# Patient Record
Sex: MALE | Race: WHITE | Employment: OTHER | URBAN - METROPOLITAN AREA
[De-identification: names, ages, dates, MRNs, and addresses within clinical notes are randomized per-mention and may not be internally consistent; named-entity substitution may affect disease eponyms.]

---

## 2020-09-14 ENCOUNTER — HOSPITAL ENCOUNTER (OUTPATIENT)
Age: 70
Discharge: HOME | End: 2020-09-16
Payer: MEDICARE

## 2020-09-14 ENCOUNTER — OFFICE VISIT (OUTPATIENT)
Dept: PRIMARY CARE CLINIC | Age: 70
End: 2020-09-14
Payer: MEDICARE

## 2020-09-14 VITALS
SYSTOLIC BLOOD PRESSURE: 122 MMHG | DIASTOLIC BLOOD PRESSURE: 70 MMHG | HEART RATE: 98 BPM | WEIGHT: 122 LBS | OXYGEN SATURATION: 99 % | HEIGHT: 69 IN | TEMPERATURE: 97.9 F | BODY MASS INDEX: 18.07 KG/M2

## 2020-09-14 PROBLEM — R07.89 LEFT-SIDED CHEST WALL PAIN: Status: ACTIVE | Noted: 2020-09-14

## 2020-09-14 PROBLEM — K21.9 GASTROESOPHAGEAL REFLUX DISEASE WITHOUT ESOPHAGITIS: Status: ACTIVE | Noted: 2020-09-14

## 2020-09-14 PROBLEM — N40.0 BENIGN PROSTATIC HYPERPLASIA: Status: ACTIVE | Noted: 2020-09-14

## 2020-09-14 PROBLEM — C34.90 PRIMARY ADENOCARCINOMA OF LUNG (HCC): Status: ACTIVE | Noted: 2020-09-14

## 2020-09-14 PROBLEM — Z90.2 S/P LOBECTOMY OF LUNG: Status: ACTIVE | Noted: 2020-09-14

## 2020-09-14 PROBLEM — I48.91 ATRIAL FIBRILLATION (HCC): Status: ACTIVE | Noted: 2020-09-14

## 2020-09-14 PROBLEM — I10 ESSENTIAL HYPERTENSION: Status: ACTIVE | Noted: 2020-09-14

## 2020-09-14 PROBLEM — E78.00 HYPERCHOLESTEROLEMIA: Status: ACTIVE | Noted: 2020-09-14

## 2020-09-14 LAB
ALBUMIN SERPL-MCNC: 4.1 G/DL (ref 3.5–5.2)
ALP BLD-CCNC: 77 U/L (ref 40–129)
ALT SERPL-CCNC: 11 U/L (ref 0–40)
ANION GAP SERPL CALCULATED.3IONS-SCNC: 16 MMOL/L (ref 7–16)
AST SERPL-CCNC: 26 U/L (ref 0–39)
BILIRUB SERPL-MCNC: 0.4 MG/DL (ref 0–1.2)
BUN BLDV-MCNC: 8 MG/DL (ref 8–23)
CALCIUM SERPL-MCNC: 9.9 MG/DL (ref 8.6–10.2)
CHLORIDE BLD-SCNC: 102 MMOL/L (ref 98–107)
CHOLESTEROL, TOTAL: 173 MG/DL (ref 0–199)
CO2: 23 MMOL/L (ref 22–29)
CREAT SERPL-MCNC: 0.9 MG/DL (ref 0.7–1.2)
GFR AFRICAN AMERICAN: >60
GFR NON-AFRICAN AMERICAN: >60 ML/MIN/1.73
GLUCOSE BLD-MCNC: 96 MG/DL (ref 74–99)
HBA1C MFR BLD: 4.8 % (ref 4–5.6)
HCT VFR BLD CALC: 41 % (ref 37–54)
HDLC SERPL-MCNC: 80 MG/DL
HEMOGLOBIN: 12.4 G/DL (ref 12.5–16.5)
LDL CHOLESTEROL CALCULATED: 74 MG/DL (ref 0–99)
MCH RBC QN AUTO: 28.3 PG (ref 26–35)
MCHC RBC AUTO-ENTMCNC: 30.2 % (ref 32–34.5)
MCV RBC AUTO: 93.6 FL (ref 80–99.9)
PDW BLD-RTO: 14 FL (ref 11.5–15)
PLATELET # BLD: 266 E9/L (ref 130–450)
PMV BLD AUTO: 10.4 FL (ref 7–12)
POTASSIUM SERPL-SCNC: 4.9 MMOL/L (ref 3.5–5)
RBC # BLD: 4.38 E12/L (ref 3.8–5.8)
SODIUM BLD-SCNC: 141 MMOL/L (ref 132–146)
TOTAL PROTEIN: 7.5 G/DL (ref 6.4–8.3)
TRIGL SERPL-MCNC: 93 MG/DL (ref 0–149)
VLDLC SERPL CALC-MCNC: 19 MG/DL
WBC # BLD: 5.9 E9/L (ref 4.5–11.5)

## 2020-09-14 PROCEDURE — 80053 COMPREHEN METABOLIC PANEL: CPT

## 2020-09-14 PROCEDURE — 99204 OFFICE O/P NEW MOD 45 MIN: CPT | Performed by: INTERNAL MEDICINE

## 2020-09-14 PROCEDURE — 3017F COLORECTAL CA SCREEN DOC REV: CPT | Performed by: INTERNAL MEDICINE

## 2020-09-14 PROCEDURE — 85027 COMPLETE CBC AUTOMATED: CPT

## 2020-09-14 PROCEDURE — 1036F TOBACCO NON-USER: CPT | Performed by: INTERNAL MEDICINE

## 2020-09-14 PROCEDURE — 83036 HEMOGLOBIN GLYCOSYLATED A1C: CPT

## 2020-09-14 PROCEDURE — 4040F PNEUMOC VAC/ADMIN/RCVD: CPT | Performed by: INTERNAL MEDICINE

## 2020-09-14 PROCEDURE — 80061 LIPID PANEL: CPT

## 2020-09-14 PROCEDURE — G8419 CALC BMI OUT NRM PARAM NOF/U: HCPCS | Performed by: INTERNAL MEDICINE

## 2020-09-14 PROCEDURE — 1123F ACP DISCUSS/DSCN MKR DOCD: CPT | Performed by: INTERNAL MEDICINE

## 2020-09-14 PROCEDURE — G8427 DOCREV CUR MEDS BY ELIG CLIN: HCPCS | Performed by: INTERNAL MEDICINE

## 2020-09-14 RX ORDER — LOSARTAN POTASSIUM 100 MG/1
TABLET ORAL DAILY
COMMUNITY
Start: 2020-04-02 | End: 2020-10-02 | Stop reason: SDUPTHER

## 2020-09-14 RX ORDER — ALFUZOSIN HYDROCHLORIDE 10 MG/1
10 TABLET, EXTENDED RELEASE ORAL DAILY
COMMUNITY
Start: 2020-07-28 | End: 2020-12-15 | Stop reason: SDUPTHER

## 2020-09-14 RX ORDER — UMECLIDINIUM 62.5 UG/1
AEROSOL, POWDER ORAL DAILY
COMMUNITY
Start: 2020-08-12 | End: 2020-10-16 | Stop reason: ALTCHOICE

## 2020-09-14 RX ORDER — BUDESONIDE AND FORMOTEROL FUMARATE DIHYDRATE 160; 4.5 UG/1; UG/1
2 AEROSOL RESPIRATORY (INHALATION) 2 TIMES DAILY
COMMUNITY
Start: 2020-08-11 | End: 2020-10-16 | Stop reason: ALTCHOICE

## 2020-09-14 RX ORDER — AMITRIPTYLINE HYDROCHLORIDE 25 MG/1
25 TABLET, FILM COATED ORAL NIGHTLY
COMMUNITY
End: 2021-08-06

## 2020-09-14 RX ORDER — BUSPIRONE HYDROCHLORIDE 5 MG/1
TABLET ORAL DAILY PRN
COMMUNITY
Start: 2020-04-16 | End: 2020-09-14 | Stop reason: ALTCHOICE

## 2020-09-14 RX ORDER — ATORVASTATIN CALCIUM 10 MG/1
TABLET, FILM COATED ORAL DAILY
COMMUNITY
Start: 2020-04-01 | End: 2020-10-02 | Stop reason: SDUPTHER

## 2020-09-14 RX ORDER — LIDOCAINE 4 G/G
1 PATCH TOPICAL DAILY
Qty: 30 PATCH | Refills: 0 | Status: SHIPPED | OUTPATIENT
Start: 2020-09-14 | End: 2020-10-14

## 2020-09-14 RX ORDER — AMIODARONE HYDROCHLORIDE 100 MG/1
100 TABLET ORAL DAILY
COMMUNITY
Start: 2020-08-31 | End: 2021-03-16 | Stop reason: SDUPTHER

## 2020-09-14 RX ORDER — PANTOPRAZOLE SODIUM 40 MG/1
40 TABLET, DELAYED RELEASE ORAL DAILY
COMMUNITY
End: 2021-03-16 | Stop reason: SDUPTHER

## 2020-09-14 ASSESSMENT — PATIENT HEALTH QUESTIONNAIRE - PHQ9
SUM OF ALL RESPONSES TO PHQ QUESTIONS 1-9: 0
SUM OF ALL RESPONSES TO PHQ9 QUESTIONS 1 & 2: 0
SUM OF ALL RESPONSES TO PHQ QUESTIONS 1-9: 0
1. LITTLE INTEREST OR PLEASURE IN DOING THINGS: 0
2. FEELING DOWN, DEPRESSED OR HOPELESS: 0

## 2020-09-14 NOTE — PROGRESS NOTES
20    Leyla Raymond, a male of 72 y.o. came to the office    HPI     Leyla Raymond presents today as a new patient. He takes alfuzosin, amiodarone, Eliquis, Lipitor, Symbicort, Advair, Cozaar, Spiriva, Incruse, Protonix and Elavil. He has a history of BPH, lung cancer, atrial fibrillation, hyper cholesterolemia, COPD hypertension GERD. He follows up with cardiology at the [facility]. He last saw them through virtual means at the end of August.  They recommended continuation of amiodarone and Eliquis. He has a history of lung cancer, adenocarcinoma. He is status post lobectomy. This was followed at [facility] as well. He also follows up with urology for BPH at [facility]. He takes alfuzosin. He is having some chest soreness and left side pain. This is over the site of the lobectomy. This pain has been better. He had it over 2 months. He does have some shortness of breath that has been stable. He was treated for pneumonia in the hospital and he had a thoracentesis. He was discharged home on an antibiotic.      Surgical History  Lobectomy in     Family History  Dad -  CAD -  at [de-identified]  Mom - DM, stroke -  in her [de-identified]  Siblings - sister - DM  at [de-identified]  Children - daughter with thyroid cancer, son - crohn's disease    Social History  Occupation- retired  Tobacco - 54 pack years, quit 60 days ago  Alcohol - None  Drug -  None    Current Outpatient Medications on File Prior to Visit   Medication Sig Dispense Refill    alfuzosin (UROXATRAL) 10 MG extended release tablet Take 10 mg by mouth daily      amiodarone (PACERONE) 100 MG tablet Take 100 mg by mouth daily      apixaban (ELIQUIS) 2.5 MG TABS tablet Take 2.5 mg by mouth 2 times daily      atorvastatin (LIPITOR) 10 MG tablet daily      budesonide-formoterol (SYMBICORT) 160-4.5 MCG/ACT AERO Inhale 2 puffs into the lungs 2 times daily      fluticasone-salmeterol (ADVAIR) 250-50 MCG/DOSE AEPB pulses. Exam reveals no gallop and no friction rub. No murmur heard. Pulmonary/Chest: Effort normal and breath sounds normal. No stridor. No respiratory distress. No wheezes or rales. Abdominal: Soft. Bowel sounds are normal. There is no distension nor mass. There is no tenderness. There is no guarding. Musculoskeletal: No edema tenderness or deformity  Neurological: Alert and oriented to person, place, and time. No cranial nerve deficit. Normal muscle tone. Coordination normal.   Skin: Skin is warm and dry. No diaphoresis. No erythema. Psychiatric: Normal mood and affect. Behavior is normal.         ASSESSMENT AND PLAN:    Thi Odom was seen today for established new doctor. Diagnoses and all orders for this visit:    Primary adenocarcinoma of lung, unspecified laterality ([facility]    Atrial fibrillation, unspecified type [facility])  -     Toyin Oneill MD, Cardiology, [place]  -     Hemoglobin A1C; Future    Gastroesophageal reflux disease without esophagitis  -     Mary Griggs DO [place] ([facility])    S/P lobectomy of lung  -     Gibran Rendon MD, Hematology and Oncology, [place] ([facility])    Essential hypertension  -     CBC; Future  -     Comprehensive Metabolic Panel; Future    Hypercholesterolemia  -     Lipid Panel; Future    Benign prostatic hyperplasia, unspecified whether lower urinary tract symptoms present  -     External Referral To Urology        Guanaco Brooks presents today as a new patient. His multiple issues today. The first of which is left-sided chest wall pain. He is status post lobectomy. Today I will give him lidocaine patch. He states that his hospitalization was complicated by pneumonia. He still does have shortness of breath. I will perform chest radiography to rule out any pneumonia versus pleural effusion. I will refer him to gastroenterology for uncontrolled GERD. I will refer him to a cardiologist at his request for atrial fibrillation.   He states that his symptoms are well controlled on Eliquis and amiodarone. I will also refer him to urology for management of his BPH. I will perform routine blood work. See him back in 4 to 6 weeks to assess the multiple follow-ups. Please note that the above documentation was prepared using voice recognition software. Every attempt was made to ensure accuracy but there may be spelling, grammatical, and contextual errors. No follow-ups on file.     Kori Osorio, DO

## 2020-09-16 ENCOUNTER — HOSPITAL ENCOUNTER (OUTPATIENT)
Dept: GENERAL RADIOLOGY | Age: 70
Discharge: HOME | End: 2020-09-18
Payer: MEDICARE

## 2020-09-16 ENCOUNTER — HOSPITAL ENCOUNTER (OUTPATIENT)
Age: 70
Discharge: HOME | End: 2020-09-18
Payer: MEDICARE

## 2020-09-16 PROCEDURE — 71046 X-RAY EXAM CHEST 2 VIEWS: CPT

## 2020-10-01 ENCOUNTER — HOSPITAL ENCOUNTER (OUTPATIENT)
Dept: CT IMAGING | Age: 70
Discharge: HOME | End: 2020-10-03
Payer: MEDICARE

## 2020-10-01 PROCEDURE — 71260 CT THORAX DX C+: CPT

## 2020-10-01 PROCEDURE — 6360000004 HC RX CONTRAST MEDICATION: Performed by: STUDENT IN AN ORGANIZED HEALTH CARE EDUCATION/TRAINING PROGRAM

## 2020-10-01 RX ADMIN — IOPAMIDOL 90 ML: 755 INJECTION, SOLUTION INTRAVENOUS at 19:10

## 2020-10-02 RX ORDER — ATORVASTATIN CALCIUM 10 MG/1
10 TABLET, FILM COATED ORAL DAILY
Qty: 30 TABLET | Refills: 5 | Status: SHIPPED
Start: 2020-10-02 | End: 2021-03-16 | Stop reason: SDUPTHER

## 2020-10-02 RX ORDER — LOSARTAN POTASSIUM 100 MG/1
100 TABLET ORAL DAILY
Qty: 30 TABLET | Refills: 5 | Status: SHIPPED
Start: 2020-10-02 | End: 2021-03-16 | Stop reason: SDUPTHER

## 2020-12-15 RX ORDER — ALFUZOSIN HYDROCHLORIDE 10 MG/1
10 TABLET, EXTENDED RELEASE ORAL DAILY
Qty: 30 TABLET | Refills: 2 | Status: SHIPPED
Start: 2020-12-15 | End: 2021-03-16 | Stop reason: SDUPTHER

## 2021-03-16 ENCOUNTER — OFFICE VISIT (OUTPATIENT)
Dept: PRIMARY CARE CLINIC | Age: 71
End: 2021-03-16
Payer: MEDICARE

## 2021-03-16 VITALS
HEART RATE: 92 BPM | WEIGHT: 147 LBS | DIASTOLIC BLOOD PRESSURE: 84 MMHG | BODY MASS INDEX: 21.71 KG/M2 | OXYGEN SATURATION: 99 % | TEMPERATURE: 97 F | SYSTOLIC BLOOD PRESSURE: 136 MMHG

## 2021-03-16 DIAGNOSIS — K21.9 GASTROESOPHAGEAL REFLUX DISEASE WITHOUT ESOPHAGITIS: ICD-10-CM

## 2021-03-16 DIAGNOSIS — I48.91 ATRIAL FIBRILLATION, UNSPECIFIED TYPE (HCC): ICD-10-CM

## 2021-03-16 DIAGNOSIS — C34.90 PRIMARY ADENOCARCINOMA OF LUNG, UNSPECIFIED LATERALITY (HCC): Primary | ICD-10-CM

## 2021-03-16 DIAGNOSIS — E78.00 HYPERCHOLESTEROLEMIA: ICD-10-CM

## 2021-03-16 DIAGNOSIS — I10 ESSENTIAL HYPERTENSION: ICD-10-CM

## 2021-03-16 DIAGNOSIS — N40.0 BENIGN PROSTATIC HYPERPLASIA, UNSPECIFIED WHETHER LOWER URINARY TRACT SYMPTOMS PRESENT: ICD-10-CM

## 2021-03-16 LAB
ALBUMIN SERPL-MCNC: 4.7 G/DL (ref 3.5–5.2)
ALP BLD-CCNC: 71 U/L (ref 40–129)
ALT SERPL-CCNC: 10 U/L (ref 0–40)
ANION GAP SERPL CALCULATED.3IONS-SCNC: 10 MMOL/L (ref 7–16)
AST SERPL-CCNC: 16 U/L (ref 0–39)
BILIRUB SERPL-MCNC: 0.5 MG/DL (ref 0–1.2)
BUN BLDV-MCNC: 11 MG/DL (ref 8–23)
CALCIUM SERPL-MCNC: 9.5 MG/DL (ref 8.6–10.2)
CHLORIDE BLD-SCNC: 102 MMOL/L (ref 98–107)
CHOLESTEROL, TOTAL: 160 MG/DL (ref 0–199)
CO2: 29 MMOL/L (ref 22–29)
CREAT SERPL-MCNC: 1.1 MG/DL (ref 0.7–1.2)
GFR AFRICAN AMERICAN: >60
GFR NON-AFRICAN AMERICAN: >60 ML/MIN/1.73
GLUCOSE BLD-MCNC: 72 MG/DL (ref 74–99)
HCT VFR BLD CALC: 32.8 % (ref 37–54)
HDLC SERPL-MCNC: 64 MG/DL
HEMOGLOBIN: 9.7 G/DL (ref 12.5–16.5)
LDL CHOLESTEROL CALCULATED: 82 MG/DL (ref 0–99)
MCH RBC QN AUTO: 24.4 PG (ref 26–35)
MCHC RBC AUTO-ENTMCNC: 29.6 % (ref 32–34.5)
MCV RBC AUTO: 82.4 FL (ref 80–99.9)
PDW BLD-RTO: 15.6 FL (ref 11.5–15)
PLATELET # BLD: 314 E9/L (ref 130–450)
PMV BLD AUTO: 10.7 FL (ref 7–12)
POTASSIUM SERPL-SCNC: 4.9 MMOL/L (ref 3.5–5)
RBC # BLD: 3.98 E12/L (ref 3.8–5.8)
SODIUM BLD-SCNC: 141 MMOL/L (ref 132–146)
TOTAL PROTEIN: 7.6 G/DL (ref 6.4–8.3)
TRIGL SERPL-MCNC: 69 MG/DL (ref 0–149)
VLDLC SERPL CALC-MCNC: 14 MG/DL
WBC # BLD: 9 E9/L (ref 4.5–11.5)

## 2021-03-16 PROCEDURE — 1123F ACP DISCUSS/DSCN MKR DOCD: CPT | Performed by: INTERNAL MEDICINE

## 2021-03-16 PROCEDURE — 1036F TOBACCO NON-USER: CPT | Performed by: INTERNAL MEDICINE

## 2021-03-16 PROCEDURE — G8427 DOCREV CUR MEDS BY ELIG CLIN: HCPCS | Performed by: INTERNAL MEDICINE

## 2021-03-16 PROCEDURE — 99214 OFFICE O/P EST MOD 30 MIN: CPT | Performed by: INTERNAL MEDICINE

## 2021-03-16 PROCEDURE — 4040F PNEUMOC VAC/ADMIN/RCVD: CPT | Performed by: INTERNAL MEDICINE

## 2021-03-16 PROCEDURE — G8420 CALC BMI NORM PARAMETERS: HCPCS | Performed by: INTERNAL MEDICINE

## 2021-03-16 PROCEDURE — G8482 FLU IMMUNIZE ORDER/ADMIN: HCPCS | Performed by: INTERNAL MEDICINE

## 2021-03-16 PROCEDURE — 3017F COLORECTAL CA SCREEN DOC REV: CPT | Performed by: INTERNAL MEDICINE

## 2021-03-16 RX ORDER — LOSARTAN POTASSIUM 100 MG/1
100 TABLET ORAL DAILY
Qty: 30 TABLET | Refills: 5 | Status: SHIPPED
Start: 2021-03-16 | End: 2021-10-12 | Stop reason: SDUPTHER

## 2021-03-16 RX ORDER — AMIODARONE HYDROCHLORIDE 100 MG/1
100 TABLET ORAL DAILY
Qty: 30 TABLET | Refills: 5 | Status: CANCELLED | OUTPATIENT
Start: 2021-03-16

## 2021-03-16 RX ORDER — PANTOPRAZOLE SODIUM 40 MG/1
40 TABLET, DELAYED RELEASE ORAL DAILY
Qty: 30 TABLET | Refills: 3 | Status: SHIPPED
Start: 2021-03-16 | End: 2021-07-09

## 2021-03-16 RX ORDER — ATORVASTATIN CALCIUM 10 MG/1
10 TABLET, FILM COATED ORAL DAILY
Qty: 30 TABLET | Refills: 5 | Status: SHIPPED
Start: 2021-03-16 | End: 2021-10-12 | Stop reason: SDUPTHER

## 2021-03-16 RX ORDER — AMIODARONE HYDROCHLORIDE 100 MG/1
100 TABLET ORAL DAILY
Qty: 30 TABLET | Refills: 3 | Status: SHIPPED | OUTPATIENT
Start: 2021-03-16

## 2021-03-16 RX ORDER — ALFUZOSIN HYDROCHLORIDE 10 MG/1
10 TABLET, EXTENDED RELEASE ORAL DAILY
Qty: 30 TABLET | Refills: 2 | Status: SHIPPED
Start: 2021-03-16 | End: 2021-08-06

## 2021-03-16 RX ORDER — TRAMADOL HYDROCHLORIDE 50 MG/1
50 TABLET ORAL EVERY 4 HOURS PRN
Qty: 30 TABLET | Refills: 0 | Status: SHIPPED | OUTPATIENT
Start: 2021-03-16 | End: 2021-03-21

## 2021-03-16 SDOH — ECONOMIC STABILITY: TRANSPORTATION INSECURITY
IN THE PAST 12 MONTHS, HAS THE LACK OF TRANSPORTATION KEPT YOU FROM MEDICAL APPOINTMENTS OR FROM GETTING MEDICATIONS?: NO

## 2021-03-16 SDOH — ECONOMIC STABILITY: TRANSPORTATION INSECURITY
IN THE PAST 12 MONTHS, HAS LACK OF TRANSPORTATION KEPT YOU FROM MEETINGS, WORK, OR FROM GETTING THINGS NEEDED FOR DAILY LIVING?: NO

## 2021-03-16 SDOH — ECONOMIC STABILITY: FOOD INSECURITY: WITHIN THE PAST 12 MONTHS, YOU WORRIED THAT YOUR FOOD WOULD RUN OUT BEFORE YOU GOT MONEY TO BUY MORE.: NEVER TRUE

## 2021-03-16 SDOH — ECONOMIC STABILITY: INCOME INSECURITY: HOW HARD IS IT FOR YOU TO PAY FOR THE VERY BASICS LIKE FOOD, HOUSING, MEDICAL CARE, AND HEATING?: NOT ASKED

## 2021-03-16 ASSESSMENT — PATIENT HEALTH QUESTIONNAIRE - PHQ9
1. LITTLE INTEREST OR PLEASURE IN DOING THINGS: 0
SUM OF ALL RESPONSES TO PHQ QUESTIONS 1-9: 0
SUM OF ALL RESPONSES TO PHQ9 QUESTIONS 1 & 2: 0

## 2021-03-16 NOTE — PROGRESS NOTES
3/16/21    Alden France, a male of 80 y.o. came to the office     Alden France presents today his last appointment he has met with a local pulmonologist.  He was started on Trelegy. He is going to have an upper endoscopy Friday. He has been having some right chest wall soreness. He used to take tramadol. Patient Active Problem List   Diagnosis    [facility] adenocarcinoma [facility] lung ([facility]    Atrial fibrillation ([facility]    Gastroesophageal reflux disease without esophagitis    S/P lobectomy of lung    Essential hypertension    Hypercholesterolemia    Benign prostatic hyperplasia    Left-sided chest wall pain      No Known Allergies  Current Outpatient Medications on File Prior to Visit   Medication Sig Dispense Refill    fluticasone-umeclidin-vilant (TRELEGY ELLIPTA) 100-62.5-25 MCG/INH AEPB Inhale 1 puff into the lungs daily 60 each 6    apixaban (ELIQUIS) 2.5 MG TABS tablet Take 2.5 mg by mouth 2 times daily      amitriptyline (ELAVIL) 25 MG tablet Take 25 mg by mouth nightly       No current facility-administered medications on file prior to visit. Review of Systems  Constitutional:Negative for activity change, appetite change, chills, fatigue and fever. Respiratory: Negative for choking, chest tightness, shortness of breath and wheezing. Cardiovascular: Negative for chest pain, palpitations and leg swelling. Gastrointestinal: Negative for abdominal distention, constipation, diarrhea, nausea and vomiting. Musculoskeletal: Negative for arthralgias, back pain, gait problem and joint swelling. Neurological: Negative for dizziness, weakness,numbness and headaches. /84   Pulse 92   Temp 97 °F (36.1 °C)   Wt 147 lb (66.7 kg)   SpO2 99%   BMI 21.71 kg/m²      Physical Exam   Constitutional:  Oriented to person, place, and time. Appears well-developed and well-nourished. No acute distress. HENT: No sinus tenderness or lymphadenopathy  Head: Normocephalic and atraumatic. Eyes: Eyes exhibits no discharge. No scleral icterus present. Neck: No tracheal deviation present. No thyromegaly present. Cardiovascular: Normal rate, regular rhythm, normal heart sounds and intact distal pulses. Exam reveals no gallop nor friction rub. No murmur heard. Pulmonary: Effort normal and breath sounds normal. No respiratory distress. No wheezes or rales. Abdomen: No signs of rigidity rebound or organomegaly  Musculoskeletal:  No tenderness to palpation  Neurological:Alert and oriented to person, place, and time. Skin: No diaphoresis. Psychiatric: Normal mood and affect. Behavior is Normal.     ASSESSMENT AND PLAN:    Carol Rowland was seen today for 6 month follow-up. Diagnoses and all orders for this visit:    Primary adenocarcinoma of lung, unspecified laterality (HCC)  -     traMADol (ULTRAM) 50 MG tablet; Take 1 tablet by mouth every 4 hours as needed for Pain for up to 5 days. Intended supply: 5 days. Take lowest dose possible to manage pain    Atrial fibrillation, unspecified type (HCC)    Gastroesophageal reflux disease without esophagitis  -     pantoprazole (PROTONIX) 40 MG tablet; Take 1 tablet by mouth daily    Essential hypertension  -     losartan (COZAAR) 100 MG tablet; Take 1 tablet by mouth daily  -     CBC; Future  -     Comprehensive Metabolic Panel; Future    Hypercholesterolemia  -     atorvastatin (LIPITOR) 10 MG tablet; Take 1 tablet by mouth daily  -     Lipid Panel; Future    Benign prostatic hyperplasia, unspecified whether lower urinary tract symptoms present  -     alfuzosin (UROXATRAL) 10 MG extended release tablet; Take 1 tablet by mouth daily    Other orders  -     amiodarone (PACERONE) 100 MG tablet;  Take 1 tablet by mouth daily      Today I will give him a short supply of tramadol given his pain status post lobectomy    I will give him the information for his pulmonologist and instructed him to follow back for CAT scan and pulmonary function testing      I will give him a short supply of amiodarone but informed her that I do not chronically prescribe this medication    He was advised to follow-up with a cardiologist as well      He does have an upcoming endoscopy for his stomach issues      I will resume his routine medications for now and obtain routine blood work. Electronically signed by Michelle Pinon DO on 3/16/2021 at 4:02 PM      Please note that the above documentation was prepared using voice recognition software. Every attempt was made to ensure accuracy but there may be spelling, grammatical, and contextual errors. Electronically signed by Michelle Pinon DO on 3/16/2021 at 4:02 PM          No follow-ups on file.     Michelle Pinon DO

## 2021-03-17 DIAGNOSIS — D64.9 ANEMIA, UNSPECIFIED TYPE: Primary | ICD-10-CM

## 2021-03-17 RX ORDER — LANOLIN ALCOHOL/MO/W.PET/CERES
325 CREAM (GRAM) TOPICAL 2 TIMES DAILY
Qty: 90 TABLET | Refills: 3 | Status: SHIPPED | OUTPATIENT
Start: 2021-03-17 | End: 2021-10-12 | Stop reason: SDUPTHER

## 2021-04-27 ENCOUNTER — HOSPITAL ENCOUNTER (OUTPATIENT)
Dept: CT IMAGING | Age: 71
Discharge: HOME | End: 2021-04-29
Payer: MEDICARE

## 2021-04-27 DIAGNOSIS — C34.32 SQUAMOUS CELL CARCINOMA OF BRONCHUS IN LEFT LOWER LOBE (HCC): ICD-10-CM

## 2021-04-27 PROCEDURE — 6360000004 HC RX CONTRAST MEDICATION: Performed by: RADIOLOGY

## 2021-04-27 PROCEDURE — 2580000003 HC RX 258: Performed by: RADIOLOGY

## 2021-04-27 PROCEDURE — 71260 CT THORAX DX C+: CPT

## 2021-04-27 RX ORDER — SODIUM CHLORIDE 0.9 % (FLUSH) 0.9 %
10 SYRINGE (ML) INJECTION PRN
Status: DISCONTINUED | OUTPATIENT
Start: 2021-04-27 | End: 2021-04-30 | Stop reason: HOSPADM

## 2021-04-27 RX ADMIN — IOPAMIDOL 90 ML: 755 INJECTION, SOLUTION INTRAVENOUS at 15:04

## 2021-04-27 RX ADMIN — Medication 10 ML: at 15:04

## 2021-05-18 ENCOUNTER — HOSPITAL ENCOUNTER (OUTPATIENT)
Dept: NUCLEAR MEDICINE | Age: 71
Discharge: HOME | End: 2021-05-20
Payer: MEDICARE

## 2021-05-18 DIAGNOSIS — C34.32 PRIMARY MALIGNANT NEOPLASM OF BRONCHUS OF LEFT LOWER LOBE (HCC): ICD-10-CM

## 2021-05-18 PROCEDURE — 3430000000 HC RX DIAGNOSTIC RADIOPHARMACEUTICAL: Performed by: RADIOLOGY

## 2021-05-18 PROCEDURE — 78815 PET IMAGE W/CT SKULL-THIGH: CPT

## 2021-05-18 PROCEDURE — A9552 F18 FDG: HCPCS | Performed by: RADIOLOGY

## 2021-05-18 PROCEDURE — 78815 PET IMAGE W/CT SKULL-THIGH: CPT | Performed by: RADIOLOGY

## 2021-05-18 RX ORDER — FLUDEOXYGLUCOSE F 18 200 MCI/ML
10 INJECTION, SOLUTION INTRAVENOUS
Status: COMPLETED | OUTPATIENT
Start: 2021-05-18 | End: 2021-05-18

## 2021-05-18 RX ADMIN — FLUDEOXYGLUCOSE F 18 15.09 MILLICURIE: 200 INJECTION, SOLUTION INTRAVENOUS at 13:05

## 2021-06-03 DIAGNOSIS — Z01.818 PRE-OP TESTING: Primary | ICD-10-CM

## 2021-06-15 ENCOUNTER — HOSPITAL ENCOUNTER (OUTPATIENT)
Dept: CT IMAGING | Age: 71
Discharge: HOME | End: 2021-06-17
Payer: MEDICARE

## 2021-06-15 VITALS
SYSTOLIC BLOOD PRESSURE: 181 MMHG | DIASTOLIC BLOOD PRESSURE: 84 MMHG | RESPIRATION RATE: 16 BRPM | TEMPERATURE: 98 F | HEART RATE: 57 BPM

## 2021-06-15 DIAGNOSIS — C34.92 NON-SMALL CELL CANCER OF LEFT LUNG (HCC): ICD-10-CM

## 2021-06-15 NOTE — PROGRESS NOTES
9547: patients daughter showed this RN proof of patients negative covid test from 6/9/21.  5505 Patient arrived via car  With daughter to Radiology department for lung biopsy. Allergies, home medications, H&P and fasting instructions reviewed with patient. Vital signs taken. Patient disclosed that he had Eliquis yesterday at noon. Per Dr. Haja Almaguer patient has to be rescheduled.

## 2021-06-17 ENCOUNTER — HOSPITAL ENCOUNTER (OUTPATIENT)
Dept: CT IMAGING | Age: 71
Discharge: HOME | End: 2021-06-19
Payer: MEDICARE

## 2021-06-17 ENCOUNTER — HOSPITAL ENCOUNTER (OUTPATIENT)
Dept: GENERAL RADIOLOGY | Age: 71
Discharge: HOME | End: 2021-06-19
Payer: MEDICARE

## 2021-06-17 VITALS
HEIGHT: 69 IN | TEMPERATURE: 97.9 F | DIASTOLIC BLOOD PRESSURE: 81 MMHG | SYSTOLIC BLOOD PRESSURE: 175 MMHG | OXYGEN SATURATION: 96 % | RESPIRATION RATE: 18 BRPM | BODY MASS INDEX: 21.48 KG/M2 | HEART RATE: 65 BPM | WEIGHT: 145 LBS

## 2021-06-17 DIAGNOSIS — C34.92 PRIMARY ADENOCARCINOMA OF LUNG, LEFT (HCC): ICD-10-CM

## 2021-06-17 DIAGNOSIS — J95.811 PNEUMOTHORAX, POST BIOPSY: ICD-10-CM

## 2021-06-17 DIAGNOSIS — C34.92 NON-SMALL CELL CANCER OF LEFT LUNG (HCC): ICD-10-CM

## 2021-06-17 LAB
ANION GAP SERPL CALCULATED.3IONS-SCNC: 8 MMOL/L (ref 7–16)
BASOPHILS ABSOLUTE: 0.06 E9/L (ref 0–0.2)
BASOPHILS RELATIVE PERCENT: 0.8 % (ref 0–2)
BUN BLDV-MCNC: 11 MG/DL (ref 6–23)
CALCIUM SERPL-MCNC: 9.7 MG/DL (ref 8.6–10.2)
CHLORIDE BLD-SCNC: 103 MMOL/L (ref 98–107)
CO2: 28 MMOL/L (ref 22–29)
CREAT SERPL-MCNC: 1.2 MG/DL (ref 0.7–1.2)
EOSINOPHILS ABSOLUTE: 0.13 E9/L (ref 0.05–0.5)
EOSINOPHILS RELATIVE PERCENT: 1.7 % (ref 0–6)
GFR AFRICAN AMERICAN: >60
GFR NON-AFRICAN AMERICAN: 60 ML/MIN/1.73
GLUCOSE BLD-MCNC: 103 MG/DL (ref 74–99)
HCT VFR BLD CALC: 42.2 % (ref 37–54)
HEMOGLOBIN: 13.1 G/DL (ref 12.5–16.5)
IMMATURE GRANULOCYTES #: 0.03 E9/L
IMMATURE GRANULOCYTES %: 0.4 % (ref 0–5)
INR BLD: 1.1
LYMPHOCYTES ABSOLUTE: 1.65 E9/L (ref 1.5–4)
LYMPHOCYTES RELATIVE PERCENT: 21.6 % (ref 20–42)
MCH RBC QN AUTO: 26.6 PG (ref 26–35)
MCHC RBC AUTO-ENTMCNC: 31 % (ref 32–34.5)
MCV RBC AUTO: 85.6 FL (ref 80–99.9)
MONOCYTES ABSOLUTE: 0.49 E9/L (ref 0.1–0.95)
MONOCYTES RELATIVE PERCENT: 6.4 % (ref 2–12)
NEUTROPHILS ABSOLUTE: 5.28 E9/L (ref 1.8–7.3)
NEUTROPHILS RELATIVE PERCENT: 69.1 % (ref 43–80)
PDW BLD-RTO: 15.4 FL (ref 11.5–15)
PLATELET # BLD: 309 E9/L (ref 130–450)
PMV BLD AUTO: 9.4 FL (ref 7–12)
POTASSIUM SERPL-SCNC: 5 MMOL/L (ref 3.5–5)
PROTHROMBIN TIME: 12.3 SEC (ref 9.3–12.4)
RBC # BLD: 4.93 E12/L (ref 3.8–5.8)
SODIUM BLD-SCNC: 139 MMOL/L (ref 132–146)
WBC # BLD: 7.6 E9/L (ref 4.5–11.5)

## 2021-06-17 PROCEDURE — 80048 BASIC METABOLIC PNL TOTAL CA: CPT

## 2021-06-17 PROCEDURE — 7100000011 HC PHASE II RECOVERY - ADDTL 15 MIN

## 2021-06-17 PROCEDURE — 71045 X-RAY EXAM CHEST 1 VIEW: CPT

## 2021-06-17 PROCEDURE — 88342 IMHCHEM/IMCYTCHM 1ST ANTB: CPT

## 2021-06-17 PROCEDURE — 7100000010 HC PHASE II RECOVERY - FIRST 15 MIN

## 2021-06-17 PROCEDURE — 32408 CORE NDL BX LNG/MED PERQ: CPT | Performed by: RADIOLOGY

## 2021-06-17 PROCEDURE — 2500000003 HC RX 250 WO HCPCS: Performed by: RADIOLOGY

## 2021-06-17 PROCEDURE — 85610 PROTHROMBIN TIME: CPT

## 2021-06-17 PROCEDURE — 88305 TISSUE EXAM BY PATHOLOGIST: CPT

## 2021-06-17 PROCEDURE — 36415 COLL VENOUS BLD VENIPUNCTURE: CPT

## 2021-06-17 PROCEDURE — 2709999900 CT NEEDLE BIOPSY LUNG PERCUTANEOUS W IMAGING GUIDANCE

## 2021-06-17 PROCEDURE — 85025 COMPLETE CBC W/AUTO DIFF WBC: CPT

## 2021-06-17 PROCEDURE — 6360000002 HC RX W HCPCS: Performed by: RADIOLOGY

## 2021-06-17 RX ORDER — FENTANYL CITRATE 50 UG/ML
INJECTION, SOLUTION INTRAMUSCULAR; INTRAVENOUS
Status: COMPLETED | OUTPATIENT
Start: 2021-06-17 | End: 2021-06-17

## 2021-06-17 RX ORDER — LIDOCAINE HYDROCHLORIDE 20 MG/ML
INJECTION, SOLUTION INFILTRATION; PERINEURAL
Status: COMPLETED | OUTPATIENT
Start: 2021-06-17 | End: 2021-06-17

## 2021-06-17 RX ORDER — SODIUM CHLORIDE 9 MG/ML
INJECTION, SOLUTION INTRAVENOUS CONTINUOUS
Status: DISCONTINUED | OUTPATIENT
Start: 2021-06-17 | End: 2021-06-20 | Stop reason: HOSPADM

## 2021-06-17 RX ORDER — MIDAZOLAM HYDROCHLORIDE 1 MG/ML
INJECTION INTRAMUSCULAR; INTRAVENOUS
Status: COMPLETED | OUTPATIENT
Start: 2021-06-17 | End: 2021-06-17

## 2021-06-17 RX ORDER — SODIUM CHLORIDE 0.9 % (FLUSH) 0.9 %
10 SYRINGE (ML) INJECTION PRN
Status: DISCONTINUED | OUTPATIENT
Start: 2021-06-17 | End: 2021-06-20 | Stop reason: HOSPADM

## 2021-06-17 RX ADMIN — LIDOCAINE HYDROCHLORIDE 12 ML: 20 INJECTION, SOLUTION INFILTRATION; PERINEURAL at 12:15

## 2021-06-17 RX ADMIN — LIDOCAINE HYDROCHLORIDE 6 ML: 20 INJECTION, SOLUTION INFILTRATION; PERINEURAL at 12:19

## 2021-06-17 RX ADMIN — MIDAZOLAM 1 MG: 1 INJECTION INTRAMUSCULAR; INTRAVENOUS at 12:10

## 2021-06-17 RX ADMIN — FENTANYL CITRATE 50 MCG: 50 INJECTION, SOLUTION INTRAMUSCULAR; INTRAVENOUS at 12:10

## 2021-06-17 ASSESSMENT — PAIN - FUNCTIONAL ASSESSMENT: PAIN_FUNCTIONAL_ASSESSMENT: 0-10

## 2021-06-17 NOTE — H&P
Interventional Radiology  Attending Pre-operative History and Physical    DIAGNOSIS:    Patient Active Problem List   Diagnosis    [facility] ([facility]    Atrial fibrillation ([facility]    Gastroesophageal reflux disease without esophagitis    S/P lobectomy of lung    Essential hypertension    Hypercholesterolemia    Benign prostatic hyperplasia    Left-sided chest wall pain       CHIEF COMPLAINT: 81 yo M with a new lung nodule in the left lower lung. Here for CT guided biopsy. Current Outpatient Medications:     ferrous sulfate (FE TABS) 325 (65 Fe) MG EC tablet, Take 1 tablet by mouth 2 times daily, Disp: 90 tablet, Rfl: 3    pantoprazole (PROTONIX) 40 MG tablet, Take 1 tablet by mouth daily, Disp: 30 tablet, Rfl: 3    losartan (COZAAR) 100 MG tablet, Take 1 tablet by mouth daily, Disp: 30 tablet, Rfl: 5    atorvastatin (LIPITOR) 10 MG tablet, Take 1 tablet by mouth daily, Disp: 30 tablet, Rfl: 5    amiodarone (PACERONE) 100 MG tablet, Take 1 tablet by mouth daily, Disp: 30 tablet, Rfl: 3    fluticasone-umeclidin-vilant (TRELEGY ELLIPTA) 100-62.5-25 MCG/INH AEPB, Inhale 1 puff into the lungs daily, Disp: 60 each, Rfl: 6    apixaban (ELIQUIS) 2.5 MG TABS tablet, Take 2.5 mg by mouth 2 times daily, Disp: , Rfl:     amitriptyline (ELAVIL) 25 MG tablet, Take 25 mg by mouth nightly, Disp: , Rfl:     alfuzosin (UROXATRAL) 10 MG extended release tablet, Take 1 tablet by mouth daily, Disp: 30 tablet, Rfl: 2    No Known Allergies    Past Medical History:   Diagnosis Date    Hyperlipidemia     Hypertension     Lung cancer ([facility]        Past Surgical History:   Procedure Laterality Date    LUNG CANCER SURGERY         History reviewed. No pertinent family history.     Social History     Socioeconomic History    Marital status: Single     Spouse name: Not on file    Number of children: Not on file    Years of education: Not on file    Highest education level: Not on file   Occupational History    Occupation: retired- construction/ painting   Tobacco Use    Smoking status: Former Smoker     Packs/day: 1.00     Years: 55.00     Pack years: 55.00     Types: Cigarettes     Start date:      Quit date: 2020     Years since quittin.9    Smokeless tobacco: Never Used   Substance and Sexual Activity    Alcohol use: Not Currently    Drug use: Never    Sexual activity: Not on file   Other Topics Concern    Not on file   Social History Narrative    Not on file     Social Determinants of Health     Financial Resource Strain:     Difficulty of Paying Living Expenses:    Food Insecurity: No Food Insecurity    Worried About Running Out of Food in the Last Year: Never true    Stefanie of Food in the Last Year: Never true   Transportation Needs: No Transportation Needs    Lack of Transportation (Medical): No    Lack of Transportation (Non-Medical): No   Physical Activity:     Days of Exercise per Week:     Minutes of Exercise per Session:    Stress:     Feeling of Stress :    Social Connections:     Frequency of Communication with Friends and Family:     Frequency of Social Gatherings with Friends and Family:     Attends Anab[employer]ist Services:     Active Member of Clubs or Organizations:     Attends Club or Organization Meetings:     Marital Status:    Intimate Partner Violence:     Fear of Current or Ex-Partner:     Emotionally Abused:     Physically Abused:     Sexually Abused:        ROS: Non-contributory other than as noted above    PHYSICAL EXAM:      Heent: Alert and orientated.     Heart:  Rapid regular rhythm    Lungs:  demonstrate no contraindications to proceed      Abdomen:  normal      DATA:  CBC:   Lab Results   Component Value Date    WBC 7.6 2021    RBC 4.93 2021    HGB 13.1 2021    HCT 42.2 2021    MCV 85.6 2021    MCH 26.6 2021    MCHC 31.0 2021    RDW 15.4 2021     2021    MPV 9.4 2021     CBC with Differential:    Lab Results   Component Value Date    WBC 7.6 06/17/2021    RBC 4.93 06/17/2021    HGB 13.1 06/17/2021    HCT 42.2 06/17/2021     06/17/2021    MCV 85.6 06/17/2021    MCH 26.6 06/17/2021    MCHC 31.0 06/17/2021    RDW 15.4 06/17/2021    LYMPHOPCT 21.6 06/17/2021    MONOPCT 6.4 06/17/2021    BASOPCT 0.8 06/17/2021    MONOSABS 0.49 06/17/2021    LYMPHSABS 1.65 06/17/2021    EOSABS 0.13 06/17/2021    BASOSABS 0.06 06/17/2021     Platelets:    Lab Results   Component Value Date     06/17/2021     BUN/Creatinine:    Lab Results   Component Value Date    BUN 11 06/17/2021    CREATININE 1.2 06/17/2021       ASSESSMENT AND PLAN:  3.  79 yo M with a new lung nodule in the left lower lung. Here for CT guided biopsy. 2.  Procedure options, risks and benefits reviewed with patient. Patient expresses understanding.     Electronically signed by Delaney Morton MD on 6/17/2021 at 12:03 PM

## 2021-06-17 NOTE — PROGRESS NOTES
1243 - Patient returned from procedure. Dressing checked, clean, dry, and intact. Patient stable. No s/s of complications noted or reported. Vitals will be checked q 15min, see flow sheets. 1301 - Report given to Len So RN.

## 2021-06-17 NOTE — PROGRESS NOTES
Patient arrived with daughter to Radiology department for lung mass biopsy. Allergies, home medications, H&P and fasting instructions reviewed with patient. Vital signs taken. 22g IV placed, blood obtained, IV flushed and prn adapter attached. Blood sample sent to lab for ordered tests. Procedural instructions given, questions answered, understanding expressed and consent signed. Patient given fluoroscopy education, no questions at this time.

## 2021-06-17 NOTE — PROGRESS NOTES
Pt's daughter has pt's COVID results on phone. This nurse visualized results and noted to be negative.

## 2021-06-17 NOTE — BRIEF OP NOTE
Brief Postoperative Note    Kalyani Biggs  YOB: 1950  [id number]    Pre-operative Diagnosis and Procedure: 81 yo M with a new lung nodule in the left lower lung. Here for CT guided biopsy. Post-operative Diagnosis: Same    Anesthesia: Local    Estimated Blood Loss: < 10 cc    Surgeon: Idalia Gonzalez MD    Complications: none    Specimen obtained: 6 cores    Findings: Successful CT guided biopsy of a nodule in the left lower lung.      Idalia Gonzalez MD   6/17/2021 1:06 PM

## 2021-06-17 NOTE — PRE SEDATION
Provider, MD   alfuzosin (UROXATRAL) 10 MG extended release tablet Take 1 tablet by mouth daily 3/16/21 6/14/21  Robert Ferrara DO     Coumadin Use Last 7 Days:  no  Antiplatelet drug therapy use last 7 days: no  Other anticoagulant use last 7 days: no  Additional Medication Information:  n/a      Pre-Sedation Documentation and Exam:   I have personally completed a history, physical exam & review of systems for this patient (see notes).     Mallampati Airway Assessment:  Mallampati Class II - (soft palate, fauces & uvula are visible)    Prior History of Anesthesia Complications:   none    ASA Classification:  Class 3 - A patient with severe systemic disease that limits activity but is not incapacitating    Sedation/ Anesthesia Plan:   intravenous sedation    Medications Planned:   midazolam (Versed) intravenously and fentanyl intravenously    Patient is an appropriate candidate for plan of sedation: yes    Electronically signed by Nguyen Ashford MD on 6/17/2021 at 1:05 PM

## 2021-06-21 ENCOUNTER — TELEPHONE (OUTPATIENT)
Dept: PRIMARY CARE CLINIC | Age: 71
End: 2021-06-21

## 2021-06-21 NOTE — TELEPHONE ENCOUNTER
It was ordered on 9/20/20 to Dr. Tayo Patrick, please check with Alejandro Guzman or his office about scheduling

## 2021-07-09 ENCOUNTER — HOSPITAL ENCOUNTER (OUTPATIENT)
Dept: RADIATION ONCOLOGY | Age: 71
Discharge: HOME | End: 2021-07-09
Payer: MEDICARE

## 2021-07-09 ENCOUNTER — HOSPITAL ENCOUNTER (OUTPATIENT)
Dept: MRI IMAGING | Age: 71
Discharge: HOME | End: 2021-07-11
Payer: MEDICARE

## 2021-07-09 VITALS
OXYGEN SATURATION: 98 % | BODY MASS INDEX: 21.1 KG/M2 | HEART RATE: 92 BPM | TEMPERATURE: 96.1 F | WEIGHT: 142.9 LBS | RESPIRATION RATE: 18 BRPM | DIASTOLIC BLOOD PRESSURE: 64 MMHG | SYSTOLIC BLOOD PRESSURE: 118 MMHG

## 2021-07-09 DIAGNOSIS — C34.90 MALIGNANT NEOPLASM OF LUNG, UNSPECIFIED LATERALITY, UNSPECIFIED PART OF LUNG (HCC): Primary | ICD-10-CM

## 2021-07-09 DIAGNOSIS — C34.32 SMALL CELL LUNG CANCER, LEFT LOWER LOBE (HCC): ICD-10-CM

## 2021-07-09 PROCEDURE — 70553 MRI BRAIN STEM W/O & W/DYE: CPT

## 2021-07-09 PROCEDURE — 99205 OFFICE O/P NEW HI 60 MIN: CPT

## 2021-07-09 PROCEDURE — A9579 GAD-BASE MR CONTRAST NOS,1ML: HCPCS | Performed by: RADIOLOGY

## 2021-07-09 PROCEDURE — 6360000004 HC RX CONTRAST MEDICATION: Performed by: RADIOLOGY

## 2021-07-09 PROCEDURE — 99205 OFFICE O/P NEW HI 60 MIN: CPT | Performed by: RADIOLOGY

## 2021-07-09 RX ORDER — PROMETHAZINE HYDROCHLORIDE 12.5 MG/1
12.5 TABLET ORAL EVERY 6 HOURS PRN
COMMUNITY
End: 2021-08-06

## 2021-07-09 RX ADMIN — GADOTERIDOL 13 ML: 279.3 INJECTION, SOLUTION INTRAVENOUS at 14:22

## 2021-07-09 NOTE — PROGRESS NOTES
 LUNG CANCER SURGERY         Family History   Problem Relation Age of Onset    Cancer Brother         bone       Social History     Socioeconomic History    Marital status: Single     Spouse name: Not on file    Number of children: 3    Years of education: Not on file    Highest education level: Not on file   Occupational History    Occupation: retired- construction/ painting   Tobacco Use    Smoking status: Former Smoker     Packs/day: 1.00     Years: 55.00     Pack years: 55.00     Types: Cigarettes     Start date:      Quit date: 2020     Years since quittin.9    Smokeless tobacco: Never Used   Substance and Sexual Activity    Alcohol use: Not Currently    Drug use: Never    Sexual activity: Not on file   Other Topics Concern    Not on file   Social History Narrative    Not on file     Social Determinants of Health     Financial Resource Strain:     Difficulty of Paying Living Expenses:    Food Insecurity: No Food Insecurity    Worried About Running Out of Food in the Last Year: Never true    [address] in the Last Year: Never true   Transportation Needs: No Transportation Needs    Lack of Transportation (Medical): No    Lack of Transportation (Non-Medical): No   Physical Activity:     Days of Exercise per Week:     Minutes of Exercise per Session:    Stress:     Feeling of Stress :    Social Connections:     Frequency of Communication with Friends and Family:     Frequency of Social Gatherings with Friends and Family:     Attends [employer] Services:     Active Member of Clubs or Organizations:     Attends Club or Organization Meetings:     Marital Status:    Intimate Partner Violence:     Fear of Current or Ex-Partner:     Emotionally Abused:     Physically Abused:     Sexually Abused:            Occupation: retired contruction worker  Retired:  YES: Patient is retired from contruction worker.         REVIEW OF SYSTEMS: <<For Level 5, 10 or more systems>> Approximately >20mins spent with patient about radiation therapy to the lung utilizing handouts and slides. Pt had a left upper lobectomy and LLL wedge resection  7/20/2020 in  [address] [facility]). Pt had a follow up Ct chest 4/27/2021 and it presented interval development of multiple left lung nodules since 10/1/2020 with largest 2.5cm. Pet Scan 4/18//2020 presented  2 left pulmonary masses with significant  FDG tracer uptake highly suspicious for neoplasm. 6/17/2021 CT-guided lung biopsy ==invasive adenocarcinoma consistent with pulmonary origin. Pt is seeing Dr. Heidy Quiñones and his recommendation is concurrent chemoradiation therapy. He is to start chemo 7/16/2021. Pt had a mediport inserted at [facility] 7/5/2021. All questions were answered from a nursing perspective and they expressed understanding of care. [facility] appointment 7/12/2021 at 230. Pacemaker/Defibulator/ICD:  No    Mediport: yes        FALLS RISK SCREENING ASSESSMENT    Instructions:  Assess the patient and enter the appropriate indicators that are present for fall risk identification. Total the numbers entered and assign a fall risk score from Table 2.  Reassess patient at a minimum every 12 weeks or with status change. Assessment   Date  7/9/2021     1. Mental Ability: confusion/cognitively impaired No - 0       2. Elimination Issues: incontinence, frequency No - 0       3. Ambulatory: use of assistive devices (walker, cane, off-loading devices), attached to equipment (IV pole, oxygen) Yes - 2     4. Sensory Limitations: dizziness, vertigo, impaired vision No - 0       5. Age 72 years or greater - 1       10. Medication: diuretics, strong analgesics, hypnotics, sedatives, antihypertensive agents   Yes - 3   7. Falls:  recent history of falls within the last 3 months (not to include slipping or tripping)   No - 0   TOTAL 6    If score of 4 or greater was education given?  Yes       TABLE 2   Risk Score Risk Level Plan of Care   0-3 Little or  No Risk 1. Provide assistance as indicated for ambulation activities  2. Reorient confused/cognitively impaired patient  3. Call-light/bell within patient's reach  4. Chair/bed in low position, stretcher/bed with siderails up except when performing patient care activities  5. Educate patient/family/caregiver on falls prevention  6.  Reassess in 12 weeks or with any noted change in patient condition which places them at a risk for a fall   4-6 Moderate Risk 1. Provide assistance as indicated for ambulation activities  2. Reorient confused/cognitively impaired patient  3. Call-light/bell within patient's reach  4. Chair/bed in low position, stretcher/bed with siderails up except when performing patient care activities  5. Educate patient/family/caregiver on falls prevention  6. Falls risk precaution (Yellow sticker Level II) placed on patient chart   7 or   Higher High Risk 1. Place patient in easily observable treatment room  2. Patient attended at all times by family member or staff  3. Provide assistance as indicated for ambulation activities  4. Reorient confused/cognitively impaired patient  5. Call-light/bell within patient's reach  6. Chair/bed in low position, stretcher/bed with siderails up except when performing patient care activities  7. Educate patient/family/caregiver on falls prevention  8. Falls risk precaution (Yellow sticker Level III) placed on patient chart           MALNUTRITION RISK SCREENING ASSESSMENT    Instructions:  Assess the patient and enter the appropriate indicators that are present for nutrition risk identification. Total the numbers entered and assign a risk score. Follow the appropriate action for total score listed below. Assessment   Date  7/9/2021     1. Have you lost weight without trying? 0- No     2. Have you been eating poorly because of a decreased appetite? 0- No   3. Do you have a diagnosis of head and neck cancer? 0- No                                                                                    TOTAL 0          Score of 0-1: No action  Score 2 or greater:  · For Non-Diabetic Patient: Recommend adding Ensure Complete 2 x daily and provide patient with Ensure wellness bag with coupons  · For Diabetic Patient: Recommend adding Glucerna Shake 2 x daily and provide patient with Glucerna Wellness bag with coupons  · Route to the dietitian via [address]    · Are you having difficulty performing daily routine tasks due to fatigue or weakness (ie: bathing/showering, dressing, housework, meal prep, work, , etc): No     · Do you have any arm flexibility/ROM restrictions, swelling or pain that limit activity: No     · Any changes in memory, attention/focus that impact daily activities: No     · Do you avoid participation in leisure/social activity due to weakness, fatigue or pain: No     ARE ANY OF THE ABOVE ARE ANSWERED YES: No          PT ASSESSMENT FOR REFERRAL    · Have you had any recent falls in the past 2 months: No     · Do you have difficulty going up/down stairs: No     · Are you having difficulty walking: No     · Do you often hold onto furniture/environmental supports or feel off balance when you are walking: No     · Do you need to take rest breaks when you are walking: No     · Any pain on a scale of 1-10 that limits your mobility: No 0/10    ARE ANY OF THE ABOVE ARE ANSWERED YES: No                       PREHAB AUDIOLOGY REFERRAL    - Is patient planned to receive Cisplatin? No. This patient is not planned to start Cisplatin. - Is patient planned to receive radiation therapy that may be directed toward auditory canals or nerves? No. Patient is not planned to start radiation therapy to auditory canals or nerves. - Is patient complaining of new onset hearing loss? No. Patient is not complaining of new onset hearing loss.           Patient education given on radiation therapy to the lung. The patient expresses understanding and acceptance of instructions.  Spike Schwartz RN 7/9/2021 11:14 AM           Spike Schwartz RN

## 2021-07-09 NOTE — PROGRESS NOTES
Radiation Oncology      [place]. Saji Lucas 51      Referring Physician: Dr. Merissa Malone      Primary Care Physician:Gopal Mosher DO   Primary Oncologist: Dr. Merissa Malone      Diagnosis: cT4 cN0 cM0       Service:  Radiation Oncology consultation performed on 7/9/21        HPI:        Alton Dunlap is a pleasant 80year old with locally advanced NSCLC. Pt had a left upper lobectomy and LLL wedge resection  7/20/2020 in [address] [place] [address]. Pt had a follow up Ct chest 4/27/2021 and it presented interval development of multiple left lung nodules since 10/1/2020 with largest 2.5cm. Pet Scan 4/18//2020 presented  2 left pulmonary masses with significant  FDG tracer uptake highly suspicious for neoplasm. 6/17/2021 CT-guided lung biopsy ==invasive adenocarcinoma consistent with pulmonary origin. Pt is seeing Dr. Ashley Davis and his recommendation is concurrent chemoradiation therapy. He is to start chemo 7/16/2021. Pt had a mediport [facility] at [facility] 7/5/[facility]21. The patient presents today to discuss fractionated external beam radiation therapy as a component of multidisciplinary, definative management. We reviewed the available medical records including the complete medical history of this pt today prior to consultation. Epic -CE and available scanned documents per the Epic Media tab were reviewed PRN. A complete ROS was also performed today and is noted below. During consultation today I personally discussed the pts workup to date; including but not limited to applicable imaging studies, Pathology reports, and interventions. The NCCN guidelines, as pertaining to the above diagnosis were also recapped for the pt today in brief. Today, Matthieu Frey  notes Sx that include sough and SOB. KPS 70.        -----    Per [address]:      -concurrent chemo-RT rec.   -[facility] records reviewed -----    Pathology reviewed:      21: BIOP + NSCLC      -----      Past Medical History:   Diagnosis Date    Hyperlipidemia     Hypertension     Lung cancer [facility])        Past Surgical History:   Procedure Laterality Date    CT NEEDLE BIOPSY LUNG PERCUTANEOUS  2021    CT NEEDLE BIOPSY LUNG PERCUTANEOUS 2021 Grace Naik MD [facility] CT    LUNG CANCER SURGERY         Family History   Problem Relation Age of Onset    Cancer Brother         bone       Current Outpatient Medications   Medication Sig Dispense Refill    promethazine (PHENERGAN) 12.5 MG tablet Take 12.5 mg by mouth every 6 hours as needed for Nausea      ferrous sulfate (FE TABS) 325 (65 Fe) MG EC tablet Take 1 tablet by mouth 2 times daily 90 tablet 3    losartan (COZAAR) 100 MG tablet Take 1 tablet by mouth daily 30 tablet 5    alfuzosin (UROXATRAL) 10 MG extended release tablet Take 1 tablet by mouth daily 30 tablet 2    atorvastatin (LIPITOR) 10 MG tablet Take 1 tablet by mouth daily 30 tablet 5    amiodarone (PACERONE) 100 MG tablet Take 1 tablet by mouth daily 30 tablet 3    fluticasone-umeclidin-vilant (TRELEGY ELLIPTA) 100-62.5-25 MCG/INH AEPB Inhale 1 puff into the lungs daily 60 each 6    apixaban (ELIQUIS) 2.5 MG TABS tablet Take 2.5 mg by mouth 2 times daily      amitriptyline (ELAVIL) 25 MG tablet Take 25 mg by mouth nightly       No current facility-administered medications for this encounter.        No Known Allergies    Social History     Socioeconomic History    Marital status: Single     Spouse name: None    Number of children: 3    Years of education: None    Highest education level: None   Occupational History    Occupation: retired- construction/ painting   Tobacco Use    Smoking status: Former Smoker     Packs/day: 1.00     Years: 55.00     Pack years: 55.00     Types: Cigarettes     Start date:      Quit date: 2020     Years since quittin.9    Smokeless tobacco: Never Used   Substance and Sexual Activity    Alcohol use: Not Currently    Drug use: Never    Sexual activity: None   Other Topics Concern    None   Social History Narrative    None     Social Determinants of Health     Financial Resource Strain:     Difficulty of Paying Living Expenses:    Food Insecurity: No Food Insecurity    Worried About Running Out of Food in the Last Year: Never true    Stefanie of Food in the Last Year: Never true   Transportation Needs: No Transportation Needs    Lack of Transportation (Medical): No    Lack of Transportation (Non-Medical): No   Physical Activity:     Days of Exercise per Week:     Minutes of Exercise per Session:    Stress:     Feeling of Stress :    Social Connections:     Frequency of Communication with Friends and Family:     Frequency of Social Gatherings with Friends and Family:     Attends [employer] Services:     Active Member of Clubs or Organizations:     Attends Club or Organization Meetings:     Marital Status:    Intimate Partner Violence:     Fear of Current or Ex-Partner:     Emotionally Abused:     Physically Abused:     Sexually Abused:            Review of Systems - History obtained from chart review and the patient  General ROS: positive for  - fatigue and FTT  Psychological ROS: negative  Ophthalmic ROS: negative  ENT ROS: negative  Allergy and Immunology ROS: negative  Hematological and Lymphatic ROS: negative  Endocrine ROS: negative  Respiratory ROS: positive for - cough and shortness of breath  Cardiovascular ROS: negative  Gastrointestinal ROS: no abdominal pain, change in bowel habits, or black or bloody stools  Genito-Urinary ROS: no dysuria, trouble voiding, or hematuria  Musculoskeletal ROS: negative  Neurological ROS: no TIA or stroke symptoms  Dermatological ROS: negative        Physical Exam  HENT:      Head: Normocephalic and atraumatic.       Right Ear: External ear normal.      Left Ear: External ear normal.      Nose: Nose normal. Mouth/Throat:      Pharynx: Oropharynx is clear. Eyes:      Pupils: Pupils are equal, round, and reactive to light. Cardiovascular:      Rate and Rhythm: Normal rate and regular rhythm. Pulses: Normal pulses. Pulmonary:      Effort: Pulmonary effort is normal.   Abdominal:      General: Abdomen is flat. Musculoskeletal:         General: Normal range of motion. Cervical back: Normal range of motion. Skin:     General: Skin is warm. Neurological:      General: No focal deficit present. Mental Status: He is alert and oriented to person, place, and time. Psychiatric:         Mood and Affect: Mood normal.         Behavior: Behavior normal.         Thought Content: Thought content normal.         Judgment: Judgment normal.             Imaging reviewed:        PET 5/18/21:  Impression   1.  2 left pulmonary mass is both demonstrating FDG avid tracer uptake   highly suspicious for neoplasm           Radiation Safety and Treatment Support:  -previous Radiation history: No  -history of connective tissue disease: No  -history of autoimmune disease: No  -pregnant: not applicable  -fertility conservation and /or contraception discussed: no  -nutrition consult prior to [address]: Yes  -PEG: No  -Dental evaluation prior to treatment:No  -Social Work requested: No  -Oncology Nurse Navigator requested: Yes  -pre + post treatment PT / Rehab / PM+R evaluation considered: Yes  -ICD: No   -ICD brand: -  -[facility] patient navigator: [place]  -Nurse Practitioners for Radiation Oncology:    ---Zoya Ledbetter MSN, RN, FNP-C   ---PURVI Banks, RN, FNP-BC        Assessment and Plan: Osmel Avilez is a pleasant and cooperative 80year old with a recent diagnosis of AJCC stage group III NSCLC. We recommend a concurrent approach to definitive management of this locally advanced non small cell lung cancer, biopsy proven NSCLC [chemo dosing per [address] record- see EMR].  Based on the clinical characteristic, this disease and stage is generally considered unresectable; optimal therapy tends to be a concurrent chemo-RT approach. Concomitant chemo-RT compared with sequential chemo-RT improved overall survival, primarily through better locoregional control, at the cost of manageable increases in acute esophageal toxicity as based on the Auperin metanalysis. These data demonstrate a benefit of concomitant chemo-RT over sequential chemo->RT on OS (HR 0.84, SS), with absolute benefit at 3-years of 5.7% (18% to 24%), 5-years 4.5% (11% to 15%). Interestingly, there was no difference in PFS (HR 0.9, p=0.07). However a decrease in locoregional progression (HR 0.777, SS), with absolute decrease of 6% at 5 years (35% to 29%) was shown. There was no difference on distant progression (HR 1.04, NS), with 5-year rate of about 40% Jaimee Youngton Oncol 2010 May 1;28(13):8227-4286). Regarding the radiation dose, 60 Gy in 2Gy/fx was established long ago in RTOG 73-01, with several other trials showing a feasibility of much higher doses however with RT alone. Interestingly in the concurrent era, RTOG 0617 tested higher dose arms (Concurrent RT + Carbo/Taxol +/- Cetuximab) these were closed early with \"negative\" results (longer term results and POFA needed), therefor 60 Gy in daily fractions with dual agent chemotherapy can be considered the standard (the Olimpia and LAMP trials also assisted in the development of this paradigm). Fractionated external beam radiation therapy may or may not be delivered with intensity modulation +/- image guidance per daily cone beam depending on the specific patient anatomy and dose constraints as described per the RTOG and QUANTEC ---Syed Franks, Int J Radiat Oncol Biol Phys. 2010 Mar 1;76(3 Suppl):S10-9.  The risks, benefits, alternatives, process and logistics of external beam radiation were reviewed (risks include but are not limited to worsening PULM function, fibrosis, esophagitis, esophageal structure, perforations, bleeding, paralysis and death). We answered all of the patient's questions to the best of our ability. Demi Artis verbalized understanding and seemed satisfied. Radiation planning will commence within 7 days; the next step in management being the simulation scan, with external beam radiation to commence in a timely fashion thereafter. It was a pleasure meeting Demi Artis today and we appreciate the referral and opportunity to be involved in his care. We had an extensive discussion today regarding the course to date (including a focused review of theapplicable radiographic and laboratory information), multidisciplinary approach to cancer care, and indications for external beam radiation therapy as a component therein. A literature review and multidisciplinary discussion was performed after seeing this patient due to the complexity of the medical decision making in this case. I personally spent greater than 90 minutes on this case and with this patient. I performed the complete history and physical as above at today's visit, at least 45 minutes was in direct discussion and  regarding disease management. Minor Jeffrey. Chantal Fowler MD [place] [address] Oncology  Cell: [phone]    [facility]:  [facility]: [phone]  [address] [place] [address] Street:  [phone]   FAX:    [phone]  [address] [place] [address] Road:  [phone]   FAX:  [phone]        NOTE: This report was transcribed using voice recognition software. Every effort was made to ensure accuracy; however, inadvertent computerized transcription errors may be present.

## 2021-07-12 ENCOUNTER — HOSPITAL ENCOUNTER (OUTPATIENT)
Dept: RADIATION ONCOLOGY | Age: 71
Discharge: HOME | End: 2021-07-12
Attending: RADIOLOGY
Payer: MEDICARE

## 2021-07-12 PROCEDURE — 77334 RADIATION TREATMENT AID(S): CPT | Performed by: RADIOLOGY

## 2021-07-12 PROCEDURE — 77263 THER RADIOLOGY TX PLNG CPLX: CPT | Performed by: RADIOLOGY

## 2021-07-12 PROCEDURE — 77470 SPECIAL RADIATION TREATMENT: CPT | Performed by: RADIOLOGY

## 2021-07-13 ENCOUNTER — TELEPHONE (OUTPATIENT)
Dept: ONCOLOGY | Age: 71
End: 2021-07-13

## 2021-07-20 ENCOUNTER — HOSPITAL ENCOUNTER (OUTPATIENT)
Dept: RADIATION ONCOLOGY | Age: 71
Discharge: HOME | End: 2021-07-20
Attending: RADIOLOGY
Payer: MEDICARE

## 2021-07-20 PROCEDURE — 77300 RADIATION THERAPY DOSE PLAN: CPT | Performed by: RADIOLOGY

## 2021-07-20 PROCEDURE — 77301 RADIOTHERAPY DOSE PLAN IMRT: CPT | Performed by: RADIOLOGY

## 2021-07-20 PROCEDURE — 77338 DESIGN MLC DEVICE FOR IMRT: CPT | Performed by: RADIOLOGY

## 2021-07-21 ENCOUNTER — HOSPITAL ENCOUNTER (OUTPATIENT)
Dept: RADIATION ONCOLOGY | Age: 71
Discharge: HOME | End: 2021-07-21
Payer: MEDICARE

## 2021-07-21 ENCOUNTER — HOSPITAL ENCOUNTER (OUTPATIENT)
Dept: RADIATION ONCOLOGY | Age: 71
End: 2021-07-21
Attending: RADIOLOGY
Payer: MEDICARE

## 2021-07-21 VITALS
RESPIRATION RATE: 18 BRPM | HEART RATE: 82 BPM | WEIGHT: 144.7 LBS | DIASTOLIC BLOOD PRESSURE: 82 MMHG | TEMPERATURE: 97.1 F | BODY MASS INDEX: 21.37 KG/M2 | SYSTOLIC BLOOD PRESSURE: 142 MMHG

## 2021-07-21 DIAGNOSIS — C34.90 MALIGNANT NEOPLASM OF LUNG, UNSPECIFIED LATERALITY, UNSPECIFIED PART OF LUNG (HCC): Primary | ICD-10-CM

## 2021-07-21 PROCEDURE — 99999 PR OFFICE/OUTPT VISIT,PROCEDURE ONLY: CPT | Performed by: RADIOLOGY

## 2021-07-21 NOTE — PROGRESS NOTES
Gricelda Ellis  7/21/2021  Wt Readings from Last 3 Encounters:   07/21/21 144 lb 11.2 oz (65.6 kg)   07/09/21 142 lb 14.4 oz (64.8 kg)   06/17/21 145 lb (65.8 kg)     Body mass index is 21.37 kg/m². Treatment Area:CTV l lung    Patient was seen today for weekly visit. Comfort Alteration  KPS:70%  Fatigue: Mild    Ventilation Alterations  Cough: No  Hemoptysis: No  Mucus Color: na  Dyspnea: No  O2 Sat: 96%    Nutritional Alteration  Anorexia: No  Nausea: No   Vomiting: No     Skin Alteration   Sensation:na    Radiation Dermatitis:  na    Mucous Membrane Alteration  Voice Changes/ Stridor/Larynx: no  Pharynx & Esophagus: na    Elimination Alterations  Constipation: no  Diarrhea:  no      Emotional  Coping: effective      Injury, potential bleeding or infection: na    Other:na    Lab Results   Component Value Date    WBC 7.6 06/17/2021     06/17/2021         BP (!) 142/82   Pulse 82   Temp 97.1 °F (36.2 °C) (Skin)   Resp 18   Wt 144 lb 11.2 oz (65.6 kg)   BMI 21.37 kg/m²   BP within normal range?  yes           Assessment/Plan: treatment started today    Marilee Layton RN

## 2021-07-22 ENCOUNTER — HOSPITAL ENCOUNTER (OUTPATIENT)
Dept: RADIATION ONCOLOGY | Age: 71
Discharge: HOME | End: 2021-07-22
Attending: RADIOLOGY
Payer: MEDICARE

## 2021-07-22 PROCEDURE — 77014 PR CT GUIDANCE PLACEMENT RAD THERAPY FIELDS: CPT | Performed by: RADIOLOGY

## 2021-07-22 PROCEDURE — 77386 HC NTSTY MODUL RAD TX DLVR CPLX: CPT | Performed by: RADIOLOGY

## 2021-07-23 ENCOUNTER — HOSPITAL ENCOUNTER (OUTPATIENT)
Dept: RADIATION ONCOLOGY | Age: 71
Discharge: HOME | End: 2021-07-23
Attending: RADIOLOGY
Payer: MEDICARE

## 2021-07-23 PROCEDURE — 77014 PR CT GUIDANCE PLACEMENT RAD THERAPY FIELDS: CPT | Performed by: RADIOLOGY

## 2021-07-23 PROCEDURE — 77386 HC NTSTY MODUL RAD TX DLVR CPLX: CPT | Performed by: RADIOLOGY

## 2021-07-26 ENCOUNTER — HOSPITAL ENCOUNTER (OUTPATIENT)
Dept: RADIATION ONCOLOGY | Age: 71
Discharge: HOME | End: 2021-07-26
Attending: RADIOLOGY
Payer: MEDICARE

## 2021-07-26 PROCEDURE — 77014 PR CT GUIDANCE PLACEMENT RAD THERAPY FIELDS: CPT | Performed by: RADIOLOGY

## 2021-07-26 PROCEDURE — 77386 HC NTSTY MODUL RAD TX DLVR CPLX: CPT | Performed by: RADIOLOGY

## 2021-07-27 ENCOUNTER — HOSPITAL ENCOUNTER (OUTPATIENT)
Dept: RADIATION ONCOLOGY | Age: 71
Discharge: HOME | End: 2021-07-27
Attending: RADIOLOGY
Payer: MEDICARE

## 2021-07-27 PROCEDURE — 77386 HC NTSTY MODUL RAD TX DLVR CPLX: CPT | Performed by: RADIOLOGY

## 2021-07-27 PROCEDURE — 77014 PR CT GUIDANCE PLACEMENT RAD THERAPY FIELDS: CPT | Performed by: RADIOLOGY

## 2021-07-27 NOTE — PROGRESS NOTES
DEPARTMENT OF RADIATION ONCOLOGY ON TREATMENT VISIT         7/27/2021      NAME:  Yeyo Gutierrez    YOB: 1950    Diagnosis: lung cancer    SUBJECTIVE:   Yeyo Gutierrez has now received fractionated external beam radiation therapy - ongoing. Past medical, surgical, social and family histories reviewed and updated as indicated. Pain: controlled    ALLERGIES:  Patient has no known allergies. Current Outpatient Medications   Medication Sig Dispense Refill    TRELEGY ELLIPTA 100-62.5-25 MCG/INH AEPB inhale 1 puff by mouth and INTO THE LUNGS once daily Rinse mouth after use 60 each 5    promethazine (PHENERGAN) 12.5 MG tablet Take 12.5 mg by mouth every 6 hours as needed for Nausea      ferrous sulfate (FE TABS) 325 (65 Fe) MG EC tablet Take 1 tablet by mouth 2 times daily 90 tablet 3    losartan (COZAAR) 100 MG tablet Take 1 tablet by mouth daily 30 tablet 5    alfuzosin (UROXATRAL) 10 MG extended release tablet Take 1 tablet by mouth daily 30 tablet 2    atorvastatin (LIPITOR) 10 MG tablet Take 1 tablet by mouth daily 30 tablet 5    amiodarone (PACERONE) 100 MG tablet Take 1 tablet by mouth daily 30 tablet 3    apixaban (ELIQUIS) 2.5 MG TABS tablet Take 2.5 mg by mouth 2 times daily      amitriptyline (ELAVIL) 25 MG tablet Take 25 mg by mouth nightly       No current facility-administered medications for this encounter. OBJECTIVE:  Alert and fully ambulatory. Pleasant and conversant. Physical Examination: General appearance - alert, well appearing, and in no distress. Wt Readings from Last 3 Encounters:   07/21/21 144 lb 11.2 oz (65.6 kg)   07/09/21 142 lb 14.4 oz (64.8 kg)   06/17/21 145 lb (65.8 kg)         ASSESSMENT/PLAN:     Patient is tolerating treatments well with expected toxicities. RBA were reviewed prior to first fraction and PRN. Current and planned dose reviewed.  Goals of treatment and potential side effects were reviewed with the patient PRN. Treatment imaging has been personally reviewed for accuracy and precision. Questions answered to apparent satisfaction. Treatments will continue as planned. Woody Hernandez.  Veda Miltno MD MS DABR  Radiation Oncologist        [facility] ([address] [place] [address]): [phone] /// FAX: [phone]  [facility]): [phone] /// FAX: [phone]  [facility]): [phone] /// FAX: [phone]

## 2021-07-28 ENCOUNTER — HOSPITAL ENCOUNTER (OUTPATIENT)
Dept: RADIATION ONCOLOGY | Age: 71
Discharge: HOME | End: 2021-07-28
Payer: MEDICARE

## 2021-07-28 ENCOUNTER — HOSPITAL ENCOUNTER (OUTPATIENT)
Dept: RADIATION ONCOLOGY | Age: 71
Discharge: HOME | End: 2021-07-28
Attending: RADIOLOGY
Payer: MEDICARE

## 2021-07-28 VITALS
RESPIRATION RATE: 18 BRPM | WEIGHT: 145 LBS | OXYGEN SATURATION: 97 % | HEART RATE: 74 BPM | DIASTOLIC BLOOD PRESSURE: 82 MMHG | BODY MASS INDEX: 21.41 KG/M2 | SYSTOLIC BLOOD PRESSURE: 138 MMHG | TEMPERATURE: 98 F

## 2021-07-28 DIAGNOSIS — C34.90 MALIGNANT NEOPLASM OF LUNG, UNSPECIFIED LATERALITY, UNSPECIFIED PART OF LUNG (HCC): Primary | ICD-10-CM

## 2021-07-28 PROCEDURE — 77386 HC NTSTY MODUL RAD TX DLVR CPLX: CPT | Performed by: RADIOLOGY

## 2021-07-28 PROCEDURE — 77014 PR CT GUIDANCE PLACEMENT RAD THERAPY FIELDS: CPT | Performed by: RADIOLOGY

## 2021-07-28 PROCEDURE — 99999 PR OFFICE/OUTPT VISIT,PROCEDURE ONLY: CPT | Performed by: RADIOLOGY

## 2021-07-28 PROCEDURE — 77336 RADIATION PHYSICS CONSULT: CPT | Performed by: RADIOLOGY

## 2021-07-28 NOTE — PROGRESS NOTES
Anthony Small  7/28/2021  Wt Readings from Last 3 Encounters:   07/28/21 145 lb (65.8 kg)   07/21/21 144 lb 11.2 oz (65.6 kg)   07/09/21 142 lb 14.4 oz (64.8 kg)     Body mass index is 21.41 kg/m². Treatment Area:CTV L LUNG    Patient was seen today for weekly visit. Comfort Alteration  KPS:70%  Fatigue: Mild    Ventilation Alterations  Cough: No  Hemoptysis: No  Mucus Color: na  Dyspnea: No  O2 Sat: 97%    Nutritional Alteration  Anorexia: No  Nausea: No   Vomiting: No     Skin Alteration   Sensation:no    Radiation Dermatitis:  no    Mucous Membrane Alteration  Voice Changes/ Stridor/Larynx: no  Pharynx & Esophagus: no    Elimination Alterations  Constipation: no  Diarrhea:  no      Emotional  Coping: somewhat effective      Injury, potential bleeding or infection: na    Other:no    Lab Results   Component Value Date    WBC 7.6 06/17/2021     06/17/2021         /82   Pulse 74   Temp 98 °F (36.7 °C) (Temporal)   Resp 18   Wt 145 lb (65.8 kg)   SpO2 97%   BMI 21.41 kg/m²   BP within normal range? yes     Assessment/Plan: Pt completed 5/30fx and 1000/6000cgy. Pt is tolerating tx well thus far.     Leigh Ann Farris RN

## 2021-07-29 ENCOUNTER — HOSPITAL ENCOUNTER (OUTPATIENT)
Dept: RADIATION ONCOLOGY | Age: 71
Discharge: HOME | End: 2021-07-29
Attending: RADIOLOGY
Payer: MEDICARE

## 2021-07-29 PROCEDURE — 77386 HC NTSTY MODUL RAD TX DLVR CPLX: CPT | Performed by: RADIOLOGY

## 2021-07-29 PROCEDURE — 77014 PR CT GUIDANCE PLACEMENT RAD THERAPY FIELDS: CPT | Performed by: RADIOLOGY

## 2021-07-30 ENCOUNTER — HOSPITAL ENCOUNTER (OUTPATIENT)
Dept: RADIATION ONCOLOGY | Age: 71
Discharge: HOME | End: 2021-07-30
Attending: RADIOLOGY
Payer: MEDICARE

## 2021-07-30 PROCEDURE — 77014 PR CT GUIDANCE PLACEMENT RAD THERAPY FIELDS: CPT | Performed by: RADIOLOGY

## 2021-07-30 PROCEDURE — 77386 HC NTSTY MODUL RAD TX DLVR CPLX: CPT | Performed by: RADIOLOGY

## 2021-08-02 ENCOUNTER — HOSPITAL ENCOUNTER (OUTPATIENT)
Dept: RADIATION ONCOLOGY | Age: 71
Discharge: HOME | End: 2021-08-02
Attending: RADIOLOGY
Payer: COMMERCIAL

## 2021-08-02 PROCEDURE — 77386 HC NTSTY MODUL RAD TX DLVR CPLX: CPT | Performed by: RADIOLOGY

## 2021-08-02 PROCEDURE — 77014 PR CT GUIDANCE PLACEMENT RAD THERAPY FIELDS: CPT | Performed by: RADIOLOGY

## 2021-08-03 ENCOUNTER — HOSPITAL ENCOUNTER (OUTPATIENT)
Dept: RADIATION ONCOLOGY | Age: 71
Discharge: HOME | End: 2021-08-03
Attending: RADIOLOGY
Payer: COMMERCIAL

## 2021-08-03 PROCEDURE — 77014 PR CT GUIDANCE PLACEMENT RAD THERAPY FIELDS: CPT | Performed by: RADIOLOGY

## 2021-08-03 PROCEDURE — 77386 HC NTSTY MODUL RAD TX DLVR CPLX: CPT | Performed by: RADIOLOGY

## 2021-08-04 ENCOUNTER — APPOINTMENT (OUTPATIENT)
Dept: RADIATION ONCOLOGY | Age: 71
End: 2021-08-04
Attending: RADIOLOGY
Payer: COMMERCIAL

## 2021-08-04 ENCOUNTER — HOSPITAL ENCOUNTER (OUTPATIENT)
Dept: RADIATION ONCOLOGY | Age: 71
Discharge: HOME | End: 2021-08-04
Attending: RADIOLOGY
Payer: COMMERCIAL

## 2021-08-04 PROCEDURE — 77336 RADIATION PHYSICS CONSULT: CPT | Performed by: RADIOLOGY

## 2021-08-04 PROCEDURE — 77386 HC NTSTY MODUL RAD TX DLVR CPLX: CPT | Performed by: RADIOLOGY

## 2021-08-05 ENCOUNTER — HOSPITAL ENCOUNTER (INPATIENT)
Age: 71
LOS: 3 days | Discharge: HOME HEALTH | DRG: 871 | End: 2021-08-09
Attending: EMERGENCY MEDICINE | Admitting: FAMILY MEDICINE
Payer: COMMERCIAL

## 2021-08-05 ENCOUNTER — HOSPITAL ENCOUNTER (OUTPATIENT)
Dept: RADIATION ONCOLOGY | Age: 71
Discharge: HOME | End: 2021-08-05
Attending: RADIOLOGY
Payer: COMMERCIAL

## 2021-08-05 ENCOUNTER — APPOINTMENT (OUTPATIENT)
Dept: GENERAL RADIOLOGY | Age: 71
DRG: 871 | End: 2021-08-05
Payer: COMMERCIAL

## 2021-08-05 ENCOUNTER — APPOINTMENT (OUTPATIENT)
Dept: CT IMAGING | Age: 71
DRG: 871 | End: 2021-08-05
Payer: COMMERCIAL

## 2021-08-05 VITALS
HEART RATE: 125 BPM | DIASTOLIC BLOOD PRESSURE: 70 MMHG | OXYGEN SATURATION: 90 % | TEMPERATURE: 99.2 F | SYSTOLIC BLOOD PRESSURE: 142 MMHG | WEIGHT: 143 LBS | BODY MASS INDEX: 21.12 KG/M2 | RESPIRATION RATE: 18 BRPM

## 2021-08-05 DIAGNOSIS — J18.9 SEPSIS DUE TO PNEUMONIA (HCC): Primary | ICD-10-CM

## 2021-08-05 DIAGNOSIS — J96.01 ACUTE RESPIRATORY FAILURE WITH HYPOXIA (HCC): ICD-10-CM

## 2021-08-05 DIAGNOSIS — N17.9 AKI (ACUTE KIDNEY INJURY) (HCC): ICD-10-CM

## 2021-08-05 DIAGNOSIS — A41.9 SEPSIS DUE TO PNEUMONIA (HCC): Primary | ICD-10-CM

## 2021-08-05 LAB
ALBUMIN SERPL-MCNC: 3.9 G/DL (ref 3.5–5.2)
ALP BLD-CCNC: 54 U/L (ref 40–129)
ALT SERPL-CCNC: 11 U/L (ref 0–40)
ANION GAP SERPL CALCULATED.3IONS-SCNC: 12 MMOL/L (ref 7–16)
ANISOCYTOSIS: ABNORMAL
AST SERPL-CCNC: 17 U/L (ref 0–39)
BASOPHILS ABSOLUTE: 0.03 E9/L (ref 0–0.2)
BASOPHILS RELATIVE PERCENT: 0.9 % (ref 0–2)
BILIRUB SERPL-MCNC: 1 MG/DL (ref 0–1.2)
BUN BLDV-MCNC: 27 MG/DL (ref 6–23)
CALCIUM SERPL-MCNC: 9 MG/DL (ref 8.6–10.2)
CHLORIDE BLD-SCNC: 98 MMOL/L (ref 98–107)
CO2: 21 MMOL/L (ref 22–29)
CREAT SERPL-MCNC: 1.6 MG/DL (ref 0.7–1.2)
EOSINOPHILS ABSOLUTE: 0 E9/L (ref 0.05–0.5)
EOSINOPHILS RELATIVE PERCENT: 0 % (ref 0–6)
GFR AFRICAN AMERICAN: 52
GFR NON-AFRICAN AMERICAN: 43 ML/MIN/1.73
GLUCOSE BLD-MCNC: 124 MG/DL (ref 74–99)
HCT VFR BLD CALC: 27.2 % (ref 37–54)
HCT VFR BLD CALC: 28.2 % (ref 37–54)
HEMOGLOBIN: 8.9 G/DL (ref 12.5–16.5)
HEMOGLOBIN: 9.2 G/DL (ref 12.5–16.5)
LACTIC ACID, SEPSIS: 1.1 MMOL/L (ref 0.5–1.9)
LYMPHOCYTES ABSOLUTE: 0.32 E9/L (ref 1.5–4)
LYMPHOCYTES RELATIVE PERCENT: 8.7 % (ref 20–42)
MCH RBC QN AUTO: 28 PG (ref 26–35)
MCH RBC QN AUTO: 28.4 PG (ref 26–35)
MCHC RBC AUTO-ENTMCNC: 32.6 % (ref 32–34.5)
MCHC RBC AUTO-ENTMCNC: 32.7 % (ref 32–34.5)
MCV RBC AUTO: 85.7 FL (ref 80–99.9)
MCV RBC AUTO: 86.9 FL (ref 80–99.9)
MONOCYTES ABSOLUTE: 0.14 E9/L (ref 0.1–0.95)
MONOCYTES RELATIVE PERCENT: 4.3 % (ref 2–12)
NEUTROPHILS ABSOLUTE: 3.1 E9/L (ref 1.8–7.3)
NEUTROPHILS RELATIVE PERCENT: 86.1 % (ref 43–80)
OVALOCYTES: ABNORMAL
PDW BLD-RTO: 15.8 FL (ref 11.5–15)
PDW BLD-RTO: 15.9 FL (ref 11.5–15)
PLATELET # BLD: 182 E9/L (ref 130–450)
PLATELET # BLD: 194 E9/L (ref 130–450)
PMV BLD AUTO: 9.6 FL (ref 7–12)
PMV BLD AUTO: 9.7 FL (ref 7–12)
POIKILOCYTES: ABNORMAL
POLYCHROMASIA: ABNORMAL
POTASSIUM SERPL-SCNC: 4.7 MMOL/L (ref 3.5–5)
PRO-BNP: 115 PG/ML (ref 0–125)
RBC # BLD: 3.13 E12/L (ref 3.8–5.8)
RBC # BLD: 3.29 E12/L (ref 3.8–5.8)
SARS-COV-2, NAAT: NOT DETECTED
SODIUM BLD-SCNC: 131 MMOL/L (ref 132–146)
TOTAL PROTEIN: 7.1 G/DL (ref 6.4–8.3)
TROPONIN, HIGH SENSITIVITY: 23 NG/L (ref 0–11)
TROPONIN, HIGH SENSITIVITY: 24 NG/L (ref 0–11)
WBC # BLD: 3.6 E9/L (ref 4.5–11.5)
WBC # BLD: 3.6 E9/L (ref 4.5–11.5)

## 2021-08-05 PROCEDURE — 85025 COMPLETE CBC W/AUTO DIFF WBC: CPT

## 2021-08-05 PROCEDURE — 83605 ASSAY OF LACTIC ACID: CPT

## 2021-08-05 PROCEDURE — 71250 CT THORAX DX C-: CPT

## 2021-08-05 PROCEDURE — 85027 COMPLETE CBC AUTOMATED: CPT

## 2021-08-05 PROCEDURE — 87635 SARS-COV-2 COVID-19 AMP PRB: CPT

## 2021-08-05 PROCEDURE — 2580000003 HC RX 258: Performed by: EMERGENCY MEDICINE

## 2021-08-05 PROCEDURE — 36415 COLL VENOUS BLD VENIPUNCTURE: CPT

## 2021-08-05 PROCEDURE — 77014 PR CT GUIDANCE PLACEMENT RAD THERAPY FIELDS: CPT | Performed by: RADIOLOGY

## 2021-08-05 PROCEDURE — 87040 BLOOD CULTURE FOR BACTERIA: CPT

## 2021-08-05 PROCEDURE — 77386 HC NTSTY MODUL RAD TX DLVR CPLX: CPT | Performed by: RADIOLOGY

## 2021-08-05 PROCEDURE — 99285 EMERGENCY DEPT VISIT HI MDM: CPT

## 2021-08-05 PROCEDURE — 93005 ELECTROCARDIOGRAM TRACING: CPT | Performed by: NURSE PRACTITIONER

## 2021-08-05 PROCEDURE — 6370000000 HC RX 637 (ALT 250 FOR IP): Performed by: NURSE PRACTITIONER

## 2021-08-05 PROCEDURE — 83880 ASSAY OF NATRIURETIC PEPTIDE: CPT

## 2021-08-05 PROCEDURE — 96365 THER/PROPH/DIAG IV INF INIT: CPT

## 2021-08-05 PROCEDURE — 71046 X-RAY EXAM CHEST 2 VIEWS: CPT

## 2021-08-05 PROCEDURE — 96367 TX/PROPH/DG ADDL SEQ IV INF: CPT

## 2021-08-05 PROCEDURE — 96366 THER/PROPH/DIAG IV INF ADDON: CPT

## 2021-08-05 PROCEDURE — 6360000002 HC RX W HCPCS: Performed by: EMERGENCY MEDICINE

## 2021-08-05 PROCEDURE — 80053 COMPREHEN METABOLIC PANEL: CPT

## 2021-08-05 PROCEDURE — 77427 RADIATION TX MANAGEMENT X5: CPT | Performed by: SPECIALIST

## 2021-08-05 PROCEDURE — 84484 ASSAY OF TROPONIN QUANT: CPT

## 2021-08-05 RX ORDER — ACETAMINOPHEN 500 MG
1000 TABLET ORAL ONCE
Status: COMPLETED | OUTPATIENT
Start: 2021-08-05 | End: 2021-08-05

## 2021-08-05 RX ADMIN — ACETAMINOPHEN 1000 MG: 500 TABLET ORAL at 18:05

## 2021-08-05 RX ADMIN — CEFEPIME HYDROCHLORIDE 2000 MG: 2 INJECTION, POWDER, FOR SOLUTION INTRAVENOUS at 19:38

## 2021-08-05 RX ADMIN — VANCOMYCIN HYDROCHLORIDE 2000 MG: 10 INJECTION, POWDER, LYOPHILIZED, FOR SOLUTION INTRAVENOUS at 21:08

## 2021-08-05 ASSESSMENT — PAIN DESCRIPTION - LOCATION: LOCATION: CHEST

## 2021-08-05 ASSESSMENT — PAIN SCALES - GENERAL
PAINLEVEL_OUTOF10: 0
PAINLEVEL_OUTOF10: 7

## 2021-08-05 ASSESSMENT — PAIN DESCRIPTION - PAIN TYPE: TYPE: ACUTE PAIN

## 2021-08-05 ASSESSMENT — PAIN DESCRIPTION - ORIENTATION: ORIENTATION: RIGHT

## 2021-08-05 NOTE — PROGRESS NOTES
Ace April 8/5/2021  Wt Readings from Last 3 Encounters:   08/05/21 143 lb (64.9 kg)   07/28/21 145 lb (65.8 kg)   07/21/21 144 lb 11.2 oz (65.6 kg)     Body mass index is 21.12 kg/m². Treatment Area:CTV lt lung     Patient was seen today for weekly visit. Comfort Alteration  KPS:70%  Fatigue: Moderate    Ventilation Alterations  Cough: Yes  Hemoptysis: No  Mucus Color: none  Dyspnea: No  O2 Sat: 90%    Nutritional Alteration  Anorexia: No  Nausea: No   Vomiting: No     Skin Alteration   Sensation:good     Radiation Dermatitis:  na    Mucous Membrane Alteration  Voice Changes/ Stridor/Larynx: no  Pharynx & Esophagus: na    Elimination Alterations  Constipation: no  Diarrhea:  no      Emotional  Coping: effective      Injury, potential bleeding or infection: na    Other:na    Lab Results   Component Value Date    WBC 7.6 06/17/2021     06/17/2021         BP (!) 142/70   Pulse 125   Temp 99.2 °F (37.3 °C) (Skin)   Resp 18   Wt 143 lb (64.9 kg)   SpO2 90%   BMI 21.12 kg/m²   BP within normal range? yes          Assessment/Plan:  10/30fx  2000/6000cGY and tolerating. Wheezing noted and instructed if he needs any care during the note to go to the ED.      Raisa Ribeiro RN

## 2021-08-05 NOTE — ED TRIAGE NOTES
FIRST PROVIDER CONTACT ASSESSMENT NOTE                                                                                                Department of Emergency Medicine                                                      First Provider Note  21  5:51 PM EDT  NAME: Rashmi Quevedo  : 1950  MRN: [id number]    Chief Complaint: Shortness of Breath (after lifting boxes yesterday)      History of Present Illness:   Rashmi Quevedo is a 80 y.o. male who presents to the ED for shortness of breath on chemo and radiation hypoxic at triage. Focused Physical Exam:  VS:    ED Triage Vitals   BP Temp Temp src Pulse Resp SpO2 Height Weight   21 1748 21 1740 -- 21 1740 21 1748 21 1740 -- 21 1748   (!) 99/54 101.6 °F (38.7 °C)  124 18 (!) 88 %  145 lb (65.8 kg)        General: Alert and in no apparent distress. Medical History:  has a past medical history of Hyperlipidemia, Hypertension, and Lung cancer ([facility] [address] Surgical History:  has a past surgical history that includes Lung cancer surgery and CT NEEDLE BIOPSY LUNG PERCUTANEOUS (2021). Social History:  reports that he quit smoking about 12 months ago. His smoking use included cigarettes. He started smoking about 56 years ago. He has a 55.00 pack-year smoking history. He has never used smokeless tobacco. He reports previous alcohol use. He reports that he does not use drugs. Family History: family history includes Cancer in his brother. Allergies: Patient has no known allergies.      Initial Plan of Care:  Initiate Treatment-Testing, Proceed toTreatment Area When Bed Available for ED Attending/MLP to Continue Care    -------------------------------------------------END OF FIRST PROVIDER CONTACT ASSESSMENT NOTE--------------------------------------------------------  Electronically signed by DEISI Whitfield CNP   DD: 21

## 2021-08-05 NOTE — PROGRESS NOTES
DEPARTMENT OF RADIATION ONCOLOGY   ON TREATMENT VISIT       8/5/2021      NAME:  Smita Beverly    YOB: 1950    DIAGNOSIS:  Lung cancer    SUBJECTIVE:   Smita Beverly has now received 2000 cGy in 10/30 fractions directed to the lung. Past medical, surgical, social and family histories reviewed and updated as indicated. PAIN: 1-2/10    ALLERGIES:  Patient has no known allergies. Current Outpatient Medications   Medication Sig Dispense Refill    TRELEGY ELLIPTA 100-62.5-25 MCG/INH AEPB inhale 1 puff by mouth and INTO THE LUNGS once daily Rinse mouth after use 60 each 5    promethazine (PHENERGAN) 12.5 MG tablet Take 12.5 mg by mouth every 6 hours as needed for Nausea      ferrous sulfate (FE TABS) 325 (65 Fe) MG EC tablet Take 1 tablet by mouth 2 times daily 90 tablet 3    losartan (COZAAR) 100 MG tablet Take 1 tablet by mouth daily 30 tablet 5    alfuzosin (UROXATRAL) 10 MG extended release tablet Take 1 tablet by mouth daily 30 tablet 2    atorvastatin (LIPITOR) 10 MG tablet Take 1 tablet by mouth daily 30 tablet 5    amiodarone (PACERONE) 100 MG tablet Take 1 tablet by mouth daily 30 tablet 3    apixaban (ELIQUIS) 2.5 MG TABS tablet Take 2.5 mg by mouth 2 times daily      amitriptyline (ELAVIL) 25 MG tablet Take 25 mg by mouth nightly       No current facility-administered medications for this encounter. OBJECTIVE:  Alert and fully ambulatory. Pleasant and conversant. Physical Examination:General appearance - alert, well appearing, and in no distress:  Constitutional: A well developed, well nourished 80 y.o. male who is alert, oriented, cooperative and in no apparent distress. Lungs: end-expiratory wheeze, rales left >right. O2 sat >92%  Cardiovascular: Regular without murmur. Skin:  Warm and dry. No obvious rashes.     Vitals:    08/05/21 1552   BP: (!) 142/70   Pulse: 125   Resp: 18   Temp: 99.2 °F (37.3 °C)   TempSrc: Skin   SpO2: 90%   Weight: 143 lb (64.9 kg)       Wt Readings from Last 3 Encounters:   08/05/21 143 lb (64.9 kg)   07/28/21 145 lb (65.8 kg)   07/21/21 144 lb 11.2 oz (65.6 kg)       ASSESSMENT/PLAN:     Patient is tolerating treatments well with expected toxicities. Ref to pulmonology for review of his inhalers. Advised that should symptoms change overnight to proceed to ER. Current and planned dose reviewed. Goals of treatment and potential side effects were reviewed with the patient. Treatment imaging has been personally reviewed for accuracy and precision. Questions answered to apparent satisfaction. Treatments will continue as planned. Thank you for the opportunity to participate in multidisciplinary management of this remarkable and pleasant patient.       Neela Osorio MD, [facility] [address], Neal Schaefer, [address] [place] [address]    Department of Radiation Oncology  List of hospitals in [place]) [facility]: [phone] (AKS: [phone])  [facility]) [facility]: [phone] (DDK: [phone])  [facility]) [facility]:  [phone] (TTZ:  [phone])

## 2021-08-06 ENCOUNTER — APPOINTMENT (OUTPATIENT)
Dept: RADIATION ONCOLOGY | Age: 71
End: 2021-08-06
Attending: RADIOLOGY
Payer: COMMERCIAL

## 2021-08-06 PROBLEM — A41.9 SEPSIS (HCC): Status: ACTIVE | Noted: 2021-08-06

## 2021-08-06 LAB
ANION GAP SERPL CALCULATED.3IONS-SCNC: 10 MMOL/L (ref 7–16)
ANISOCYTOSIS: ABNORMAL
BASOPHILS ABSOLUTE: 0 E9/L (ref 0–0.2)
BASOPHILS RELATIVE PERCENT: 0.3 % (ref 0–2)
BUN BLDV-MCNC: 26 MG/DL (ref 6–23)
BURR CELLS: ABNORMAL
CALCIUM SERPL-MCNC: 9 MG/DL (ref 8.6–10.2)
CHLORIDE BLD-SCNC: 99 MMOL/L (ref 98–107)
CO2: 24 MMOL/L (ref 22–29)
CREAT SERPL-MCNC: 1.5 MG/DL (ref 0.7–1.2)
CREATININE URINE: 78 MG/DL (ref 40–278)
EKG ATRIAL RATE: 119 BPM
EKG P AXIS: 74 DEGREES
EKG P-R INTERVAL: 132 MS
EKG Q-T INTERVAL: 314 MS
EKG QRS DURATION: 102 MS
EKG QTC CALCULATION (BAZETT): 441 MS
EKG R AXIS: -7 DEGREES
EKG T AXIS: 105 DEGREES
EKG VENTRICULAR RATE: 119 BPM
EOSINOPHILS ABSOLUTE: 0 E9/L (ref 0.05–0.5)
EOSINOPHILS RELATIVE PERCENT: 0.6 % (ref 0–6)
GFR AFRICAN AMERICAN: 56
GFR NON-AFRICAN AMERICAN: 46 ML/MIN/1.73
GLUCOSE BLD-MCNC: 98 MG/DL (ref 74–99)
HCT VFR BLD CALC: 28.3 % (ref 37–54)
HEMOGLOBIN: 9 G/DL (ref 12.5–16.5)
LYMPHOCYTES ABSOLUTE: 0.38 E9/L (ref 1.5–4)
LYMPHOCYTES RELATIVE PERCENT: 12.2 % (ref 20–42)
MCH RBC QN AUTO: 28 PG (ref 26–35)
MCHC RBC AUTO-ENTMCNC: 31.8 % (ref 32–34.5)
MCV RBC AUTO: 87.9 FL (ref 80–99.9)
MONOCYTES ABSOLUTE: 0.13 E9/L (ref 0.1–0.95)
MONOCYTES RELATIVE PERCENT: 3.5 % (ref 2–12)
NEUTROPHILS ABSOLUTE: 2.69 E9/L (ref 1.8–7.3)
NEUTROPHILS RELATIVE PERCENT: 84.3 % (ref 43–80)
OVALOCYTES: ABNORMAL
PDW BLD-RTO: 15.7 FL (ref 11.5–15)
PLATELET # BLD: 183 E9/L (ref 130–450)
PMV BLD AUTO: 9.5 FL (ref 7–12)
POIKILOCYTES: ABNORMAL
POLYCHROMASIA: ABNORMAL
POTASSIUM REFLEX MAGNESIUM: 4.3 MMOL/L (ref 3.5–5)
RBC # BLD: 3.22 E12/L (ref 3.8–5.8)
SODIUM BLD-SCNC: 133 MMOL/L (ref 132–146)
SODIUM URINE: <20 MMOL/L
TOXIC GRANULATION: ABNORMAL
VANCOMYCIN TROUGH: 13.5 MCG/ML (ref 5–16)
WBC # BLD: 3.2 E9/L (ref 4.5–11.5)

## 2021-08-06 PROCEDURE — 80048 BASIC METABOLIC PNL TOTAL CA: CPT

## 2021-08-06 PROCEDURE — 6370000000 HC RX 637 (ALT 250 FOR IP): Performed by: FAMILY MEDICINE

## 2021-08-06 PROCEDURE — 87449 NOS EACH ORGANISM AG IA: CPT

## 2021-08-06 PROCEDURE — 84300 ASSAY OF URINE SODIUM: CPT

## 2021-08-06 PROCEDURE — 93010 ELECTROCARDIOGRAM REPORT: CPT | Performed by: INTERNAL MEDICINE

## 2021-08-06 PROCEDURE — 6360000002 HC RX W HCPCS: Performed by: FAMILY MEDICINE

## 2021-08-06 PROCEDURE — 80202 ASSAY OF VANCOMYCIN: CPT

## 2021-08-06 PROCEDURE — 2580000003 HC RX 258: Performed by: FAMILY MEDICINE

## 2021-08-06 PROCEDURE — 99222 1ST HOSP IP/OBS MODERATE 55: CPT | Performed by: FAMILY MEDICINE

## 2021-08-06 PROCEDURE — 94640 AIRWAY INHALATION TREATMENT: CPT

## 2021-08-06 PROCEDURE — 2060000000 HC ICU INTERMEDIATE R&B

## 2021-08-06 PROCEDURE — 82570 ASSAY OF URINE CREATININE: CPT

## 2021-08-06 PROCEDURE — 36415 COLL VENOUS BLD VENIPUNCTURE: CPT

## 2021-08-06 PROCEDURE — 94664 DEMO&/EVAL PT USE INHALER: CPT

## 2021-08-06 PROCEDURE — 87040 BLOOD CULTURE FOR BACTERIA: CPT

## 2021-08-06 PROCEDURE — 85025 COMPLETE CBC W/AUTO DIFF WBC: CPT

## 2021-08-06 RX ORDER — IPRATROPIUM BROMIDE AND ALBUTEROL SULFATE 2.5; .5 MG/3ML; MG/3ML
1 SOLUTION RESPIRATORY (INHALATION)
Status: DISCONTINUED | OUTPATIENT
Start: 2021-08-06 | End: 2021-08-09 | Stop reason: HOSPADM

## 2021-08-06 RX ORDER — ACETAMINOPHEN 650 MG/1
650 SUPPOSITORY RECTAL EVERY 6 HOURS PRN
Status: DISCONTINUED | OUTPATIENT
Start: 2021-08-06 | End: 2021-08-09 | Stop reason: HOSPADM

## 2021-08-06 RX ORDER — TAMSULOSIN HYDROCHLORIDE 0.4 MG/1
0.4 CAPSULE ORAL DAILY
Status: DISCONTINUED | OUTPATIENT
Start: 2021-08-06 | End: 2021-08-09 | Stop reason: HOSPADM

## 2021-08-06 RX ORDER — LOPERAMIDE HYDROCHLORIDE 2 MG/1
2 CAPSULE ORAL 4 TIMES DAILY PRN
Status: DISCONTINUED | OUTPATIENT
Start: 2021-08-06 | End: 2021-08-09 | Stop reason: HOSPADM

## 2021-08-06 RX ORDER — ARFORMOTEROL TARTRATE 15 UG/2ML
15 SOLUTION RESPIRATORY (INHALATION) 2 TIMES DAILY
Status: DISCONTINUED | OUTPATIENT
Start: 2021-08-06 | End: 2021-08-09 | Stop reason: HOSPADM

## 2021-08-06 RX ORDER — SODIUM CHLORIDE 9 MG/ML
INJECTION, SOLUTION INTRAVENOUS CONTINUOUS
Status: DISCONTINUED | OUTPATIENT
Start: 2021-08-06 | End: 2021-08-09 | Stop reason: HOSPADM

## 2021-08-06 RX ORDER — ONDANSETRON 2 MG/ML
4 INJECTION INTRAMUSCULAR; INTRAVENOUS EVERY 6 HOURS PRN
Status: DISCONTINUED | OUTPATIENT
Start: 2021-08-06 | End: 2021-08-09 | Stop reason: HOSPADM

## 2021-08-06 RX ORDER — ONDANSETRON 4 MG/1
4 TABLET, ORALLY DISINTEGRATING ORAL EVERY 8 HOURS PRN
Status: DISCONTINUED | OUTPATIENT
Start: 2021-08-06 | End: 2021-08-09 | Stop reason: HOSPADM

## 2021-08-06 RX ORDER — ACETAMINOPHEN 325 MG/1
650 TABLET ORAL EVERY 6 HOURS PRN
Status: DISCONTINUED | OUTPATIENT
Start: 2021-08-06 | End: 2021-08-09 | Stop reason: HOSPADM

## 2021-08-06 RX ORDER — SODIUM CHLORIDE 0.9 % (FLUSH) 0.9 %
5-40 SYRINGE (ML) INJECTION PRN
Status: DISCONTINUED | OUTPATIENT
Start: 2021-08-06 | End: 2021-08-09 | Stop reason: HOSPADM

## 2021-08-06 RX ORDER — GUAIFENESIN 600 MG/1
600 TABLET, EXTENDED RELEASE ORAL 2 TIMES DAILY
Status: DISCONTINUED | OUTPATIENT
Start: 2021-08-06 | End: 2021-08-06 | Stop reason: CLARIF

## 2021-08-06 RX ORDER — POLYETHYLENE GLYCOL 3350 17 G/17G
17 POWDER, FOR SOLUTION ORAL DAILY PRN
Status: DISCONTINUED | OUTPATIENT
Start: 2021-08-06 | End: 2021-08-09 | Stop reason: HOSPADM

## 2021-08-06 RX ORDER — ATORVASTATIN CALCIUM 10 MG/1
10 TABLET, FILM COATED ORAL DAILY
Status: DISCONTINUED | OUTPATIENT
Start: 2021-08-06 | End: 2021-08-09 | Stop reason: HOSPADM

## 2021-08-06 RX ORDER — SODIUM CHLORIDE 9 MG/ML
25 INJECTION, SOLUTION INTRAVENOUS PRN
Status: DISCONTINUED | OUTPATIENT
Start: 2021-08-06 | End: 2021-08-09 | Stop reason: HOSPADM

## 2021-08-06 RX ORDER — AMITRIPTYLINE HYDROCHLORIDE 25 MG/1
25 TABLET, FILM COATED ORAL NIGHTLY
Status: DISCONTINUED | OUTPATIENT
Start: 2021-08-06 | End: 2021-08-09 | Stop reason: HOSPADM

## 2021-08-06 RX ORDER — LOSARTAN POTASSIUM 50 MG/1
100 TABLET ORAL DAILY
Status: DISCONTINUED | OUTPATIENT
Start: 2021-08-06 | End: 2021-08-09 | Stop reason: HOSPADM

## 2021-08-06 RX ORDER — BUDESONIDE 0.25 MG/2ML
0.25 INHALANT ORAL 2 TIMES DAILY
Status: DISCONTINUED | OUTPATIENT
Start: 2021-08-06 | End: 2021-08-09 | Stop reason: HOSPADM

## 2021-08-06 RX ORDER — AMIODARONE HYDROCHLORIDE 200 MG/1
100 TABLET ORAL DAILY
Status: DISCONTINUED | OUTPATIENT
Start: 2021-08-06 | End: 2021-08-09 | Stop reason: HOSPADM

## 2021-08-06 RX ORDER — FERROUS SULFATE 325(65) MG
325 TABLET ORAL 2 TIMES DAILY
Status: DISCONTINUED | OUTPATIENT
Start: 2021-08-06 | End: 2021-08-09 | Stop reason: HOSPADM

## 2021-08-06 RX ORDER — SODIUM CHLORIDE 0.9 % (FLUSH) 0.9 %
5-40 SYRINGE (ML) INJECTION EVERY 12 HOURS SCHEDULED
Status: DISCONTINUED | OUTPATIENT
Start: 2021-08-06 | End: 2021-08-09 | Stop reason: HOSPADM

## 2021-08-06 RX ORDER — GUAIFENESIN 400 MG/1
400 TABLET ORAL 3 TIMES DAILY
Status: DISCONTINUED | OUTPATIENT
Start: 2021-08-06 | End: 2021-08-09 | Stop reason: HOSPADM

## 2021-08-06 RX ORDER — FLUTICASONE FUROATE, UMECLIDINIUM BROMIDE AND VILANTEROL TRIFENATATE 100; 62.5; 25 UG/1; UG/1; UG/1
1 POWDER RESPIRATORY (INHALATION) DAILY
COMMUNITY
End: 2021-09-17 | Stop reason: SDUPTHER

## 2021-08-06 RX ADMIN — GUAIFENESIN 400 MG: 400 TABLET ORAL at 23:05

## 2021-08-06 RX ADMIN — LOPERAMIDE HYDROCHLORIDE 2 MG: 2 CAPSULE ORAL at 12:40

## 2021-08-06 RX ADMIN — TAMSULOSIN HYDROCHLORIDE 0.4 MG: 0.4 CAPSULE ORAL at 11:15

## 2021-08-06 RX ADMIN — IPRATROPIUM BROMIDE AND ALBUTEROL SULFATE 1 AMPULE: .5; 3 SOLUTION RESPIRATORY (INHALATION) at 10:09

## 2021-08-06 RX ADMIN — FERROUS SULFATE TAB 325 MG (65 MG ELEMENTAL FE) 325 MG: 325 (65 FE) TAB at 11:15

## 2021-08-06 RX ADMIN — IPRATROPIUM BROMIDE AND ALBUTEROL SULFATE 1 AMPULE: .5; 3 SOLUTION RESPIRATORY (INHALATION) at 20:26

## 2021-08-06 RX ADMIN — LOSARTAN POTASSIUM 100 MG: 50 TABLET, FILM COATED ORAL at 11:15

## 2021-08-06 RX ADMIN — SODIUM CHLORIDE 75 ML: 9 INJECTION, SOLUTION INTRAVENOUS at 04:59

## 2021-08-06 RX ADMIN — SODIUM CHLORIDE, PRESERVATIVE FREE 10 ML: 5 INJECTION INTRAVENOUS at 23:05

## 2021-08-06 RX ADMIN — ARFORMOTEROL TARTRATE 15 MCG: 15 SOLUTION RESPIRATORY (INHALATION) at 20:26

## 2021-08-06 RX ADMIN — CEFEPIME HYDROCHLORIDE 2000 MG: 2 INJECTION, POWDER, FOR SOLUTION INTRAVENOUS at 05:22

## 2021-08-06 RX ADMIN — APIXABAN 2.5 MG: 2.5 TABLET, FILM COATED ORAL at 23:05

## 2021-08-06 RX ADMIN — ATORVASTATIN CALCIUM 10 MG: 10 TABLET, FILM COATED ORAL at 11:15

## 2021-08-06 RX ADMIN — CEFEPIME HYDROCHLORIDE 2000 MG: 2 INJECTION, POWDER, FOR SOLUTION INTRAVENOUS at 23:12

## 2021-08-06 RX ADMIN — AMIODARONE HYDROCHLORIDE 100 MG: 200 TABLET ORAL at 11:16

## 2021-08-06 RX ADMIN — AMITRIPTYLINE HYDROCHLORIDE 25 MG: 25 TABLET, FILM COATED ORAL at 23:11

## 2021-08-06 RX ADMIN — FERROUS SULFATE TAB 325 MG (65 MG ELEMENTAL FE) 325 MG: 325 (65 FE) TAB at 23:05

## 2021-08-06 RX ADMIN — SODIUM CHLORIDE: 9 INJECTION, SOLUTION INTRAVENOUS at 11:14

## 2021-08-06 RX ADMIN — APIXABAN 2.5 MG: 2.5 TABLET, FILM COATED ORAL at 11:16

## 2021-08-06 RX ADMIN — SODIUM CHLORIDE: 9 INJECTION, SOLUTION INTRAVENOUS at 15:31

## 2021-08-06 RX ADMIN — VANCOMYCIN HYDROCHLORIDE 1000 MG: 1 INJECTION, POWDER, LYOPHILIZED, FOR SOLUTION INTRAVENOUS at 23:58

## 2021-08-06 RX ADMIN — BUDESONIDE 250 MCG: 0.25 SUSPENSION RESPIRATORY (INHALATION) at 20:26

## 2021-08-06 ASSESSMENT — ENCOUNTER SYMPTOMS
DIARRHEA: 0
BACK PAIN: 0
COUGH: 0
VOMITING: 0
SINUS PRESSURE: 0
CONSTIPATION: 0
ABDOMINAL PAIN: 0
SORE THROAT: 0
SHORTNESS OF BREATH: 1
NAUSEA: 0
EYE PAIN: 0
WHEEZING: 0
EYE REDNESS: 0
ABDOMINAL DISTENTION: 0
BLOOD IN STOOL: 0
RHINORRHEA: 0
EYE DISCHARGE: 0

## 2021-08-06 ASSESSMENT — PAIN SCALES - GENERAL
PAINLEVEL_OUTOF10: 0

## 2021-08-06 NOTE — ED NOTES
Bed: 33  Expected date:   Expected time:   Means of arrival:   Comments:  Room R Shaheen [address]  08/06/21 9106

## 2021-08-06 NOTE — CONSULTS
Palliative Care Department  [phone]  Palliative Care Initial Consult  Provider Duran Germain, [address] [facility]  [id number]  Hospital Day: 2  Date of Initial Consult: 8/6/2021  Referring Provider: Nura Ruelas MD  Palliative Medicine was consulted for assistance with: Missy Irene  Chief Complaint Today:  diarrhea    Brief Summary of Hospital Course:   Nilda Castaneda is a 80 y.o. with a medical history of HTN, HLD, former tobacco use disorder, AJCC stage group III lung adenocarcinoma s/p LEONARDO lobectomy and LLL wedge resection who was admitted on 8/5/2021 from home with a CHIEF COMPLAINT of SOB. ASSESSMENT/PLAN:     Recommendations:     Diarrhea:   -  Possibly related to chemo, started this week   -  Prn immodium  Urinary retention:   -  Currently has perla, reports this is a new problem   -  Defer to primary team for management    Pertinent Hospital Problems      Community acquired LLL pneumonia in setting of chemo/radiation   ALBAN   Recurrence of lung adenocarcinoma currently on chemoradiation   Elevated troponin   Atrial fibrillation on Eliquis   Hypertension   Normocytic anemia likely secondary to chronic disease   COPD      Palliative Care Encounter / Counseling Regarding Goals of Care  Please see detailed goals of care discussion as below   At this time, Nilda Castaneda, Does have capacity for medical decision-making. Capacity is time limited and situation/question specific   During encounter daughter was present   Outcome of goals of care meeting: continue current treatment, wants to restart chemoradiation as soon as possible   Code status Full Code   Advanced Directives: no POA or living will in [facility]   Surrogate/Legal NOK:  o Daughter Walter Hpokins [phone]    Spiritual assessment: no spiritual distress identified  Bereavement and grief: to be determined  Referrals to: none today    SUBJECTIVE:     Details of Conversation:  Chart reviewed. Evaluated patient at bedside, daughter present. Patient states he had lower abdominal discomfort earlier related to inability to urinate but after perla was placed discomfort resolved. Has had diarrhea (2-3 loose stools daily) for the past several days, wants prn medication for that. Reports his SOB has resolved, still on nasal cannula, does not have oxygen at home. Denies weight loss and in fact has gained weight (from 116# to 140#). Completely independent at home, lives with his daughter. Moved from [facility] in the past year due to daughter being concerned about his health. Anxious about how long chemoradiation will be delayed due to being hospitalized. Discussed code status options, wants some time to think about this. Interested in talking with PM SW about HCPOA/living will which he has never filled out. Goal is to resume chemoradiation and live longer. Physical Function:  PPS: 80    History Of Present Illness:    Per H&P  \"79 y.o. male with past medical history of HTN, HLD, A. Fib, lung CA (s/p left upper lobectomy) and GERD . He presents to the ED due of shortness of breath for the last 24 hours . He states that it worse with activity . Patient had radiatio today . He states that he has been coughing . He reports chills and feeling warm . He denies any chest pain nausea vomiting or diarrhea. In the ED he was febrile at 101.6 blood pressure 107/53 `   88% on room  RR 18 pulse 70 . WBC 3.6 HGB 8.9 Lactic acid 1.1  creatinine 1.6  Trop 24 >>23 CXR no acute process . CT Chest revealed left lower lobe pneumonia \"    Past Medical History:          Diagnosis Date    Hyperlipidemia     Hypertension     Lung cancer [facility])        Past Surgical History:          Procedure Laterality Date    CT NEEDLE BIOPSY LUNG PERCUTANEOUS  6/17/2021    CT NEEDLE BIOPSY LUNG PERCUTANEOUS 6/17/2021 Vonnie Alanis MD [facility] CT    LUNG CANCER SURGERY         Medications Prior to Admission:      Prior to Admission medications    Medication Sig Start Date End Date Taking? Authorizing Provider   fluticasone-umeclidin-vilant (TRELEGY ELLIPTA) 100-62.5-25 MCG/INH AEPB Inhale 1 puff into the lungs daily   Yes Historical Provider, MD   ferrous sulfate (FE TABS) 325 (65 Fe) MG EC tablet Take 1 tablet by mouth 2 times daily 3/17/21  Yes Merryl Michelet Ferrara, DO   losartan (COZAAR) 100 MG tablet Take 1 tablet by mouth daily 3/16/21  Yes Merryl Michelet Alem, DO   atorvastatin (LIPITOR) 10 MG tablet Take 1 tablet by mouth daily 3/16/21  Yes Merryl Michelet Alem, DO   amiodarone (PACERONE) 100 MG tablet Take 1 tablet by mouth daily 3/16/21  Yes Merryl Michelet Alem, DO   apixaban (ELIQUIS) 2.5 MG TABS tablet Take 2.5 mg by mouth 2 times daily 8/31/20  Yes Historical Provider, MD       Allergies:  Patient has no known allergies. Social History:      The patient currently lives at home with daughter    TOBACCO:   reports that he quit smoking about 12 months ago. His smoking use included cigarettes. He started smoking about 56 years ago. He has a 55.00 pack-year smoking history. He has never used smokeless tobacco.  ETOH:   reports previous alcohol use. Family History:      Reviewed in detail and positive as follows:        Problem Relation Age of Onset    Cancer Brother         bone       REVIEW OF SYSTEMS:   Pertinent positives as noted in the HPI. All other systems reviewed and negative.     OBJECTIVE:   Prognosis: depends upon goals and unknown    Physical Exam:  BP (!) 143/74   Pulse 76   Temp 97.9 °F (36.6 °C) (Oral)   Resp 20   Ht 5' 8.9\" (1.75 m)   Wt 145 lb (65.8 kg)   SpO2 99%   BMI 21.48 kg/m²     Constitutional:  Elderly, thin, NAD, awake, alert  Eyes: no scleral icterus, normal lids, no discharge  ENMT:  Normocephalic, atraumatic, mucosa moist, EOMI  Neck:  trachea midline, no JVD  Lungs:  CTA bilaterally, no audible rhonchi or wheezes noted, respirations unlabored, no retractions, absent breath sounds left base  Heart[de-identified]  RRR, distant heart tones, no murmur, rub, or gallop noted during exam  Abd:  Soft, non tender, non distended, bowel sounds present  :  Deferred, has perla  MSK: sarcopenia present, loss of muscle mass with temple muscle wasting  Ext:  Moving all extremities, no edema, pulses present  Skin:  Warm and dry, no rashes on visible skin  Psych: non-anxious affect  Neuro:  PERRL, Alert, grossly nonfocal; following commands    Objective data reviewed: labs, images, records, medication use, vitals and chart    Labs:     Recent Labs     08/05/21  1800 08/05/21  2050 08/06/21  0422   WBC 3.6* 3.6* 3.2*   HGB 9.2* 8.9* 9.0*   HCT 28.2* 27.2* 28.3*    182 183     Recent Labs     08/05/21  1753 08/06/21  0422   * 133   K 4.7 4.3   CL 98 99   CO2 21* 24   BUN 27* 26*   CREATININE 1.6* 1.5*   CALCIUM 9.0 9.0     Recent Labs     08/05/21  1753   AST 17   ALT 11   BILITOT 1.0   ALKPHOS 54     No results for input(s): INR in the last 72 hours. No results for input(s): Shellia Bugler in the last 72 hours. Urinalysis:    No results found for: Erinn Candelario, BACTERIA, [address] [facility], [place], [place] [address], GLUCOSEU      Discussed patient and the plan of care with the other IDT members: Palliative Medicine IDT Team, Patient and Family    Time/Communication  Greater than 50% of time spent, total 50 minutes in counseling and coordination of care at the bedside regarding goals of care, symptom management, diagnosis and prognosis and see above. Thank you for allowing Palliative Medicine to participate in the care of Anthony Small.

## 2021-08-06 NOTE — PROGRESS NOTES
Nurse to nurse report called to Fe Booker on 7WE. Patient updated on bed assignment. Transport put in for transfer.

## 2021-08-06 NOTE — ED NOTES
Blood cultures obtained from right hand per policy. Set one of two drawn at this time by Hugh horton lpn.             Geeta Echeverria LPN  49/77/02 [id number]

## 2021-08-06 NOTE — ED NOTES
Blood cultures obtained from left forearm per policy. Set  two of two drawn at this time by Dunia horton lpn.             Dominick Nugent LPN  42/22/19 [id number]

## 2021-08-06 NOTE — ED PROVIDER NOTES
ELTON Maradiaga is a 80 y.o. male with a PMHx significant for HTN, HLD, A. Fib, lung CA (s/p left upper lobectomy) and GERD who presents with one day of SOB. The patients complaint is persistent, moderate in severity and worsened by activity. The patient states he is currently undergoing both chemo and radiation for his cancer. He had radiation today and the doctor listened to his lungs and advised him to come to the ER to be evaluated. He has not taken anything for his symptoms. The patient denies recent trauma, fever, chills, fatigue, HA, dizziness, vision changes, congestion, rhinorrhea, neck pain, chest pain, palpitations, hx of MI, hx of blood clots, LE edema, cough, wheezing, abdominal pain, N/V/D/C, hematochezia, melena, dysuria, hematuria, generalized weakness and paresthesias. The patient is currently taking Eliquis. Tobacco Hx:   reports that he quit smoking about 12 months ago. His smoking use included cigarettes. He started smoking about 56 years ago. He has a 55.00 pack-year smoking history. He has never used smokeless tobacco.    Alcohol Hx:   reports previous alcohol use. Illicit Drug Hx:  Reports no hx of illicit drug use. The history is provided by the patient. Last Tetanus (if applicable): n/a    Review of Systems   Constitutional: Negative for chills, diaphoresis, fatigue and fever. HENT: Negative for congestion, postnasal drip, rhinorrhea, sinus pressure, sneezing and sore throat. Eyes: Negative for pain, discharge and redness. Respiratory: Positive for shortness of breath. Negative for cough and wheezing. Cardiovascular: Negative for chest pain, palpitations and leg swelling. Gastrointestinal: Negative for abdominal distention, abdominal pain, blood in stool, constipation, diarrhea, nausea and vomiting. Genitourinary: Negative for difficulty urinating, dysuria, flank pain, frequency and hematuria.    Musculoskeletal: Negative for arthralgias, back pain, myalgias and neck pain. Skin: Negative for rash and wound. Neurological: Negative for dizziness, syncope, speech difficulty, weakness, light-headedness, numbness and headaches. Physical Exam  Vitals and nursing note reviewed. Constitutional:       General: He is awake. He is not in acute distress. Appearance: He is not diaphoretic. HENT:      Head: Normocephalic and atraumatic. Right Ear: External ear normal.      Left Ear: External ear normal.      Nose: Nose normal. No congestion or rhinorrhea. Mouth/Throat:      Mouth: Mucous membranes are dry. Pharynx: Oropharynx is clear. No posterior oropharyngeal erythema. Eyes:      General: No scleral icterus. Right eye: No discharge. Left eye: No discharge. Extraocular Movements: Extraocular movements intact. Conjunctiva/sclera: Conjunctivae normal.   Cardiovascular:      Rate and Rhythm: Regular rhythm. Tachycardia present. Heart sounds: Normal heart sounds. No murmur heard. No friction rub. No gallop. Comments: Upper extremity and lower extremity distal pulses intact bilaterally +2/4  Pulmonary:      Effort: Pulmonary effort is normal. No respiratory distress. Breath sounds: Rales present. No wheezing or rhonchi. Comments: Decreased air movement noted throughout with bibasilar crackles. Abdominal:      General: There is no distension. Palpations: Abdomen is soft. Tenderness: There is no abdominal tenderness. There is no guarding or rebound. Musculoskeletal:         General: No tenderness or deformity. Normal range of motion. Cervical back: Normal range of motion and neck supple. No rigidity. No muscular tenderness. Right lower leg: No edema. Left lower leg: No edema. Lymphadenopathy:      Cervical: No cervical adenopathy. Skin:     General: Skin is warm and dry. Capillary Refill: Capillary refill takes less than 2 seconds.       Findings: No erythema or rash.   Neurological:      General: No focal deficit present. Mental Status: He is alert and oriented to person, place, and time. Sensory: No sensory deficit. Motor: No weakness. Coordination: Coordination normal.          --------------------------------------------- PAST HISTORY ---------------------------------------------  Past Medical History:  has a past medical history of Hyperlipidemia, Hypertension, and Lung cancer ([facility] [address] Past Surgical History:  has a past surgical history that includes Lung cancer surgery and CT NEEDLE BIOPSY LUNG PERCUTANEOUS (6/17/2021). Social History:  reports that he quit smoking about 12 months ago. His smoking use included cigarettes. He started smoking about 56 years ago. He has a 55.00 pack-year smoking history. He has never used smokeless tobacco. He reports previous alcohol use. He reports that he does not use drugs. Family History: family history includes Cancer in his brother. Home Meds: Not in a hospital admission. The patients home medications have been reviewed. Allergies: Patient has no known allergies. ------------------------- NURSING NOTES AND VITALS REVIEWED ---------------------------  Date / Time Roomed:  8/5/2021  6:50 PM  ED Bed Assignment:  20/20    The nursing notes within the ED encounter and vital signs as below have been reviewed. BP (!) 128/59   Pulse 66   Temp 97 °F (36.1 °C) (Oral)   Resp 20   Wt 145 lb (65.8 kg)   SpO2 100%   BMI 21.41 kg/m²   -------------------------------------------------- RESULTS / INTERVENTIONS -------------------------------------------------  All laboratory and radiology tests have been reviewed by this physician.     LABS:  Results for orders placed or performed during the hospital encounter of 08/05/21   COVID-19, Rapid    Specimen: Nasopharyngeal Swab   Result Value Ref Range    SARS-CoV-2, NAAT Not Detected Not Detected   CBC   Result Value Ref Range    WBC 3.6 (L) 4.5 - 11.5 E9/L Poikilocytes 1+     Ovalocytes 2+    Troponin   Result Value Ref Range    Troponin, High Sensitivity 23 (H) 0 - 11 ng/L   Basic Metabolic Panel w/ Reflex to MG   Result Value Ref Range    Sodium 133 132 - 146 mmol/L    Potassium reflex Magnesium 4.3 3.5 - 5.0 mmol/L    Chloride 99 98 - 107 mmol/L    CO2 24 22 - 29 mmol/L    Anion Gap 10 7 - 16 mmol/L    Glucose 98 74 - 99 mg/dL    BUN 26 (H) 6 - 23 mg/dL    CREATININE 1.5 (H) 0.7 - 1.2 mg/dL    GFR Non-African American 46 >=60 mL/min/1.73    GFR African American 56     Calcium 9.0 8.6 - 10.2 mg/dL   CBC auto differential   Result Value Ref Range    WBC 3.2 (L) 4.5 - 11.5 E9/L    RBC 3.22 (L) 3.80 - 5.80 E12/L    Hemoglobin 9.0 (L) 12.5 - 16.5 g/dL    Hematocrit 28.3 (L) 37.0 - 54.0 %    MCV 87.9 80.0 - 99.9 fL    MCH 28.0 26.0 - 35.0 pg    MCHC 31.8 (L) 32.0 - 34.5 %    RDW 15.7 (H) 11.5 - 15.0 fL    Platelets 259 319 - 150 E9/L    MPV 9.5 7.0 - 12.0 fL   EKG 12 Lead   Result Value Ref Range    Ventricular Rate 119 BPM    Atrial Rate 119 BPM    P-R Interval 132 ms    QRS Duration 102 ms    Q-T Interval 314 ms    QTc Calculation (Bazett) 441 ms    P Axis 74 degrees    R Axis -7 degrees    T Axis 105 degrees       RADIOLOGY: Interpreted by Radiologist unless otherwise noted. CT CHEST WO CONTRAST   Final Result   Status post surgical changes in the left upper lobe with persistent loculated   pneumothorax in the left upper lobe. Progressive masses in the lingula and left lower lobe concerning for   progressive malignancy with possible developing mid metastatic involvement in   the right lung. Focal patchy masslike infiltrates in the left lower lobe concerning for   pneumonia or less likely developing additional metastatic malignancy. Continued surveillance is recommended. XR CHEST (2 VW)   Final Result   Surgical changes in the left hemithorax from prior left upper lobectomy,   unchanged since the prior examination.       MediPort line in the sodium chloride infusion (75 mLs Intravenous New Bag 8/6/21 8856)   vancomycin (VANCOCIN) 1,250 mg in dextrose 5 % 250 mL IVPB (has no administration in time range)   acetaminophen (TYLENOL) tablet 1,000 mg (1,000 mg Oral Given 8/5/21 7909)   vancomycin (VANCOCIN) 2,000 mg in dextrose 5 % 500 mL IVPB (0 mg Intravenous Stopped 8/6/21 0010)   cefepime (MAXIPIME) 2000 mg IVPB minibag (0 mg Intravenous Stopped 8/5/21 2008)       Procedures:  No procedures performed. --------------------------------- PROGRESS NOTES / ADDITIONAL PROVIDER NOTES ---------------------------------  Consultations:  As outlined below. ED Course:     6068: All results were discussed with the patient and I have provided specific details regarding the plan to admit the patient. The patient was stable at the time of admission and was without objective evidence of hemodynamic instability. The patient was seen in the emergency department by myself and the assigned attending physician, Dr. Esperanza Matos, who agreed with the assessment, plan and decision to admit as laid out herein. The patient verbalized an understanding and agreement with the plan for admission. All questions were answered and the patient was deemed to be in stable condition at the time of transport. MDM:  Patient presented from home complaining of one day of worsening SOB. On arrival, the patient was hypotensive, tachycardic, hypoxic and febrile with remaining VS wnl. EKG and physical exam were as documented above. Work up revealed a negative covid with leukopenia. The patient also has an ALBAN with a Cr of 1.6. CTA chest revealed signs of worsening malignancy with a LLL PNA. He was given doses of vanco and cefepime and the decision was made to admit him to the hospital. The patient was stable at the time of his disposition.       This patient's ED course included: a personal history and physicial examination, re-evaluation prior to disposition, multiple bedside re-evaluations, IV medications, cardiac monitoring and continuous pulse oximetry    Diagnosis:  1. Sepsis due to pneumonia ([facility] [address]    2. Acute respiratory failure with hypoxia (HCC)    3. ALBAN (acute kidney injury) ([facility] [address]        Disposition:  Patient's disposition: Admit to telemetry  Patient's condition is stable. This patient was seen, examined and treated with Dr. Anu De Dios. All pertinent aspects of the patient's care were discussed with the attending physician.        Joi Marshall DO  Resident  08/06/21 9193

## 2021-08-06 NOTE — PROGRESS NOTES
Pharmacy Consultation Note  (Antibiotic Dosing and Monitoring)    Initial consult date: 2021  Consulting physician: Reena Trejo MD   Drug: Vancomycin  Indication: Pneumonia     Age/  Gender Height Weight IBW Dosing weight  Allergy Information   71 y.o./male 5' 8.9\" (175 cm) 145 lb (65.8 kg)     Ideal body weight: 70.5 kg (155 lb 5.6 oz)  65.8 kg  Patient has no known allergies. Temp (24hrs), Av.5 °F (36.9 °C), Min:97 °F (36.1 °C), Max:101.6 °F (38.7 °C)          Date  WBC BUN SCr CrCl  (mL/min) Drug/Dose Time   Given Level(s)   (Time) Comments   21 3.6 27 1.6  40 mL/min   Vancomycin 2000 mg IV  2108  Dose administered in ED    3.2 26 1.5 43 Vancomycin 1000 mg IV x 1 <2100> [email or web address]                                Intake/Output Summary (Last 24 hours) at 2021 1358  Last data filed at 2021 0650  Gross per 24 hour   Intake 50 ml   Output 2 ml   Net 48 ml       Historical Cultures:  No results found for: ORG  No results for input(s): BC in the last 72 hours. Cultures:  available culture and sensitivity results were reviewed in [facility]    Assessment:  · 80 y.o. male has been initiated Vancomycin.   · Estimated CrCl = 43 mL/min  · Goal trough level = 15-20 mcg/mL  · sCr 1.5    Plan:  · Vancomycin at a dose of 1,000 mg IV x 1 at 2100 pending level at 2000  · Monitor renal function   · Clinical pharmacy to follow      Jose Arias, PharmD, [facility], BCGP 2021 1:58 PM

## 2021-08-06 NOTE — H&P
Hospital Medicine History & Physical      PCP: Xena Patterson DO    Date of Admission: 8/5/2021    Date of Service: Pt seen/examined on 8/6/2021 and Admitted to Inpatient with expected LOS greater than two midnights due to medical therapy. Chief Complaint:  Fever       History Of Present Illness:    80 y.o. male with past medical history of HTN, HLD, A. Fib, lung CA (s/p left upper lobectomy) and GERD . He presents to the ED due of shortness of breath for the last 24 hours . He states that it worse with activity . Patient had radiatio today . He states that he has been coughing . He reports chills and feeling warm . He denies any chest pain nausea vomiting or diarrhea. In the ED he was febrile at 101.6 blood pressure 107/53 `   88% on room  RR 18 pulse 70 . WBC 3.6 HGB 8.9 Lactic acid 1.1  creatinine 1.6  Trop 24 >>23 CXR no acute process . CT Chest revealed left lower lobe pneumonia     Past Medical History:          Diagnosis Date    Hyperlipidemia     Hypertension     Lung cancer [facility])        Past Surgical History:          Procedure Laterality Date    CT NEEDLE BIOPSY LUNG PERCUTANEOUS  6/17/2021    CT NEEDLE BIOPSY LUNG PERCUTANEOUS 6/17/2021 Lilliam Marie MD [facility] CT    LUNG CANCER SURGERY         Medications Prior to Admission:      Prior to Admission medications    Medication Sig Start Date End Date Taking?  Authorizing Provider   Nicole Cuellorigan 100-62.5-25 MCG/INH AEPB inhale 1 puff by mouth and INTO THE LUNGS once daily Rinse mouth after use 7/20/21   Baljinder Rae MD   promethazine (PHENERGAN) 12.5 MG tablet Take 12.5 mg by mouth every 6 hours as needed for Nausea    Historical Provider, MD   ferrous sulfate (FE TABS) 325 (65 Fe) MG EC tablet Take 1 tablet by mouth 2 times daily 3/17/21   Desean Ferrara DO   losartan (COZAAR) 100 MG tablet Take 1 tablet by mouth daily 3/16/21   Desean Ferrara DO   alfuzosin (UROXATRAL) 10 MG extended release tablet Take 1 tablet by mouth daily 3/16/21 6/14/21  Ramsey Ferrara,    atorvastatin (LIPITOR) 10 MG tablet Take 1 tablet by mouth daily 3/16/21   Naty Bath, DO   amiodarone (PACERONE) 100 MG tablet Take 1 tablet by mouth daily 3/16/21   Naty Bath, DO   apixaban (ELIQUIS) 2.5 MG TABS tablet Take 2.5 mg by mouth 2 times daily 8/31/20   Historical Provider, MD   amitriptyline (ELAVIL) 25 MG tablet Take 25 mg by mouth nightly    Historical Provider, MD       Allergies:  Patient has no known allergies. Social History:      The patient currently lives with family     TOBACCO:   reports that he quit smoking about 12 months ago. His smoking use included cigarettes. He started smoking about 56 years ago. He has a 55.00 pack-year smoking history. He has never used smokeless tobacco.  ETOH:   reports previous alcohol use. Family History:       Reviewed in detail and negative for DM, CAD, Cancer, CVA. Positive as follows:        Problem Relation Age of Onset    Cancer Brother         bone       REVIEW OF SYSTEMS:   Pertinent positives as noted in the HPI. All other systems reviewed and negative. PHYSICAL EXAM:    BP (!) 107/53   Pulse 70   Temp 97.9 °F (36.6 °C) (Oral)   Resp 20   Wt 145 lb (65.8 kg)   SpO2 99%   BMI 21.41 kg/m²     General appearance:  No apparent distress, appears stated age and cooperative. HEENT:  Normal cephalic, atraumatic without obvious deformity. Pupils equal, round, and reactive to light. Extra ocular muscles intact. Conjunctivae/corneas clear. Neck: Supple, with full range of motion. No jugular venous distention. Trachea midline. Respiratory:  Normal respiratory effort. Clear to auscultation, bilaterally without Rales/Wheezes/Rhonchi. Cardiovascular:  Regular rate and rhythm with normal S1/S2 without murmurs, rubs or gallops. Abdomen: Soft, non-tender, non-distended with normal bowel sounds. Musculoskeletal:  No clubbing, cyanosis or edema bilaterally.   Full range of motion without deformity. Skin: Skin color, texture, turgor normal.  No rashes or lesions. Neurologic:  Neurovascularly intact without any focal sensory/motor deficits. Cranial nerves: II-XII intact, grossly non-focal.  Psychiatric:  Alert and oriented, thought content appropriate, normal insight    CXR:  I have reviewed the CXR    EKG:  I have reviewed the EKG       Labs:     Recent Labs     08/05/21  1800 08/05/21 2050   WBC 3.6* 3.6*   HGB 9.2* 8.9*   HCT 28.2* 27.2*    182     Recent Labs     08/05/21  1753   *   K 4.7   CL 98   CO2 21*   BUN 27*   CREATININE 1.6*   CALCIUM 9.0     Recent Labs     08/05/21  1753   AST 17   ALT 11   BILITOT 1.0   ALKPHOS 54     No results for input(s): INR in the last 72 hours. No results for input(s): Connee Matar in the last 72 hours. Urinalysis:    No results found for: Alyne Claw, BACTERIA, RBCUA, BLOODU, SPECGRAV, GLUCOSEU      ASSESSMENT:    Active Hospital Problems    Diagnosis Date Noted    [facility] [A41.9] 08/06/2021       PLAN:    Community Acquired Pneumonia   Patient is febrile in ED CT reveals left lower lobe pneumonia . COVID neg Lactic acid 1.1  Admit inpatient vitals telemetry blood cultures IV vanc and cefepime legionella and strep  Antigens     Acute kidney injury : is 1.6 >> 1.2 June 2021 , may be prerenal will obtain urine studies hydrate with intravenous fluids     History of Lung cancer ;  Oncology and Palliative care  consulted     Normocytic Anemia: sec to malignancy HGB 8.9 >>13.1 in June 2021 will  monitor    Elevated Trop ; 24 >> 23 likely current infection      Atrial Fibrillation :C/w Amiodarone Eliquis     Hypertension : C/w Losartan     COPD : C/w bronchodilators                 DVT Prophylaxis: Eliquis   Diet: Regular   Code Status: Full        Birdie Burroughs MD

## 2021-08-06 NOTE — ED NOTES
Initial contact with pt alert and oriented x 4, skin warm and dry, lung sound diminished, 02 2l nasal cannula. Pt has port to right chest, abdomen soft nondistended bs present, IV PRESNT 20 GAUGE TO LEFT FOREARM AND RIGHT FOREARM 22 GAUGE HEPLOCKED. Pt clothes removed and placed in personal bag, labeled. Pt denies pain. sr x 2 up and call light in reach. Vital signs taken.      Angélica Martínez, MARVEL  00/16/81 5637

## 2021-08-07 LAB
ANION GAP SERPL CALCULATED.3IONS-SCNC: 9 MMOL/L (ref 7–16)
ANISOCYTOSIS: ABNORMAL
BASOPHILS ABSOLUTE: 0 E9/L (ref 0–0.2)
BASOPHILS RELATIVE PERCENT: 0.5 % (ref 0–2)
BUN BLDV-MCNC: 17 MG/DL (ref 6–23)
CALCIUM SERPL-MCNC: 8.9 MG/DL (ref 8.6–10.2)
CHLORIDE BLD-SCNC: 104 MMOL/L (ref 98–107)
CO2: 22 MMOL/L (ref 22–29)
CREAT SERPL-MCNC: 1.1 MG/DL (ref 0.7–1.2)
EOSINOPHILS ABSOLUTE: 0 E9/L (ref 0.05–0.5)
EOSINOPHILS RELATIVE PERCENT: 0.5 % (ref 0–6)
GFR AFRICAN AMERICAN: >60
GFR NON-AFRICAN AMERICAN: >60 ML/MIN/1.73
GLUCOSE BLD-MCNC: 101 MG/DL (ref 74–99)
HCT VFR BLD CALC: 24.2 % (ref 37–54)
HEMOGLOBIN: 7.8 G/DL (ref 12.5–16.5)
L. PNEUMOPHILA SEROGP 1 UR AG: NORMAL
LYMPHOCYTES ABSOLUTE: 0.32 E9/L (ref 1.5–4)
LYMPHOCYTES RELATIVE PERCENT: 14.8 % (ref 20–42)
MCH RBC QN AUTO: 27.8 PG (ref 26–35)
MCHC RBC AUTO-ENTMCNC: 32.2 % (ref 32–34.5)
MCV RBC AUTO: 86.1 FL (ref 80–99.9)
MONOCYTES ABSOLUTE: 0.08 E9/L (ref 0.1–0.95)
MONOCYTES RELATIVE PERCENT: 3.5 % (ref 2–12)
NEUTROPHILS ABSOLUTE: 1.72 E9/L (ref 1.8–7.3)
NEUTROPHILS RELATIVE PERCENT: 81.7 % (ref 43–80)
NUCLEATED RED BLOOD CELLS: 0.9 /100 WBC
OVALOCYTES: ABNORMAL
PDW BLD-RTO: 15.4 FL (ref 11.5–15)
PLATELET # BLD: 158 E9/L (ref 130–450)
PMV BLD AUTO: 9.6 FL (ref 7–12)
POIKILOCYTES: ABNORMAL
POLYCHROMASIA: ABNORMAL
POTASSIUM REFLEX MAGNESIUM: 4.1 MMOL/L (ref 3.5–5)
RBC # BLD: 2.81 E12/L (ref 3.8–5.8)
SODIUM BLD-SCNC: 135 MMOL/L (ref 132–146)
STREP PNEUMONIAE ANTIGEN, URINE: NORMAL
WBC # BLD: 2.1 E9/L (ref 4.5–11.5)

## 2021-08-07 PROCEDURE — 6370000000 HC RX 637 (ALT 250 FOR IP): Performed by: FAMILY MEDICINE

## 2021-08-07 PROCEDURE — 94640 AIRWAY INHALATION TREATMENT: CPT

## 2021-08-07 PROCEDURE — 6360000002 HC RX W HCPCS: Performed by: FAMILY MEDICINE

## 2021-08-07 PROCEDURE — 80048 BASIC METABOLIC PNL TOTAL CA: CPT

## 2021-08-07 PROCEDURE — 2580000003 HC RX 258: Performed by: FAMILY MEDICINE

## 2021-08-07 PROCEDURE — 85025 COMPLETE CBC W/AUTO DIFF WBC: CPT

## 2021-08-07 PROCEDURE — 36415 COLL VENOUS BLD VENIPUNCTURE: CPT

## 2021-08-07 PROCEDURE — 2060000000 HC ICU INTERMEDIATE R&B

## 2021-08-07 RX ADMIN — APIXABAN 2.5 MG: 2.5 TABLET, FILM COATED ORAL at 19:43

## 2021-08-07 RX ADMIN — APIXABAN 2.5 MG: 2.5 TABLET, FILM COATED ORAL at 09:21

## 2021-08-07 RX ADMIN — GUAIFENESIN 400 MG: 400 TABLET ORAL at 09:20

## 2021-08-07 RX ADMIN — IPRATROPIUM BROMIDE AND ALBUTEROL SULFATE 1 AMPULE: .5; 3 SOLUTION RESPIRATORY (INHALATION) at 16:14

## 2021-08-07 RX ADMIN — TAMSULOSIN HYDROCHLORIDE 0.4 MG: 0.4 CAPSULE ORAL at 09:20

## 2021-08-07 RX ADMIN — IPRATROPIUM BROMIDE 0.5 MG: 0.5 SOLUTION RESPIRATORY (INHALATION) at 20:26

## 2021-08-07 RX ADMIN — IPRATROPIUM BROMIDE AND ALBUTEROL SULFATE 1 AMPULE: .5; 3 SOLUTION RESPIRATORY (INHALATION) at 13:09

## 2021-08-07 RX ADMIN — CEFEPIME HYDROCHLORIDE 2000 MG: 2 INJECTION, POWDER, FOR SOLUTION INTRAVENOUS at 18:46

## 2021-08-07 RX ADMIN — LOSARTAN POTASSIUM 100 MG: 50 TABLET, FILM COATED ORAL at 09:20

## 2021-08-07 RX ADMIN — ARFORMOTEROL TARTRATE 15 MCG: 15 SOLUTION RESPIRATORY (INHALATION) at 09:01

## 2021-08-07 RX ADMIN — CEFEPIME HYDROCHLORIDE 2000 MG: 2 INJECTION, POWDER, FOR SOLUTION INTRAVENOUS at 06:23

## 2021-08-07 RX ADMIN — VANCOMYCIN HYDROCHLORIDE 1250 MG: 10 INJECTION, POWDER, LYOPHILIZED, FOR SOLUTION INTRAVENOUS at 21:29

## 2021-08-07 RX ADMIN — FERROUS SULFATE TAB 325 MG (65 MG ELEMENTAL FE) 325 MG: 325 (65 FE) TAB at 19:43

## 2021-08-07 RX ADMIN — SODIUM CHLORIDE: 9 INJECTION, SOLUTION INTRAVENOUS at 05:29

## 2021-08-07 RX ADMIN — BUDESONIDE 250 MCG: 0.25 SUSPENSION RESPIRATORY (INHALATION) at 20:24

## 2021-08-07 RX ADMIN — IPRATROPIUM BROMIDE 0.5 MG: 0.5 SOLUTION RESPIRATORY (INHALATION) at 16:14

## 2021-08-07 RX ADMIN — GUAIFENESIN 400 MG: 400 TABLET ORAL at 14:21

## 2021-08-07 RX ADMIN — ARFORMOTEROL TARTRATE 15 MCG: 15 SOLUTION RESPIRATORY (INHALATION) at 20:26

## 2021-08-07 RX ADMIN — IPRATROPIUM BROMIDE AND ALBUTEROL SULFATE 1 AMPULE: .5; 3 SOLUTION RESPIRATORY (INHALATION) at 09:00

## 2021-08-07 RX ADMIN — SODIUM CHLORIDE, PRESERVATIVE FREE 10 ML: 5 INJECTION INTRAVENOUS at 09:20

## 2021-08-07 RX ADMIN — AMIODARONE HYDROCHLORIDE 100 MG: 200 TABLET ORAL at 09:20

## 2021-08-07 RX ADMIN — GUAIFENESIN 400 MG: 400 TABLET ORAL at 19:43

## 2021-08-07 RX ADMIN — IPRATROPIUM BROMIDE AND ALBUTEROL SULFATE 1 AMPULE: .5; 3 SOLUTION RESPIRATORY (INHALATION) at 20:25

## 2021-08-07 RX ADMIN — FERROUS SULFATE TAB 325 MG (65 MG ELEMENTAL FE) 325 MG: 325 (65 FE) TAB at 09:20

## 2021-08-07 RX ADMIN — BUDESONIDE 250 MCG: 0.25 SUSPENSION RESPIRATORY (INHALATION) at 09:00

## 2021-08-07 RX ADMIN — AMITRIPTYLINE HYDROCHLORIDE 25 MG: 25 TABLET, FILM COATED ORAL at 19:43

## 2021-08-07 RX ADMIN — ATORVASTATIN CALCIUM 10 MG: 10 TABLET, FILM COATED ORAL at 09:20

## 2021-08-07 ASSESSMENT — PAIN SCALES - GENERAL
PAINLEVEL_OUTOF10: 0

## 2021-08-07 NOTE — PROGRESS NOTES
Subjective: The patient is awake and alert. No problems overnight. Denies chest pain, angina, dyspnea or abdominal discomfort. No nausea or vomiting. Tolerating diet. Objective:    BP (!) 103/55   Pulse 80   Temp 97.4 °F (36.3 °C) (Temporal)   Resp 17   Ht 5' 8.9\" (1.75 m)   Wt 145 lb (65.8 kg)   SpO2 96%   BMI 21.48 kg/m²     General: NAD, elderly thin gentleman. HEENT: No thrush or mucositis, EOMI, PERRLA  Heart:  RRR, no murmurs, gallops, or rubs.   Lungs:  Decreased BS bilaterally, no wheeze, rales or rhonchi  Abd: BS present, nontender, nondistended, no masses  Extrem:  No clubbing, cyanosis, or edema  Lymphatics: No palpable adenopathy in cervical and supraclavicular regions  Skin: Intact, no petechia or purpura    CBC with Differential:    Lab Results   Component Value Date    WBC 2.1 08/07/2021    RBC 2.81 08/07/2021    HGB 7.8 08/07/2021    HCT 24.2 08/07/2021     08/07/2021    MCV 86.1 08/07/2021    MCH 27.8 08/07/2021    MCHC 32.2 08/07/2021    RDW 15.4 08/07/2021    NRBC 0.9 08/07/2021    LYMPHOPCT 14.8 08/07/2021    MONOPCT 3.5 08/07/2021    BASOPCT 0.5 08/07/2021    MONOSABS 0.08 08/07/2021    LYMPHSABS 0.32 08/07/2021    EOSABS 0.00 08/07/2021    BASOSABS 0.00 08/07/2021     CMP:    Lab Results   Component Value Date     08/07/2021    K 4.1 08/07/2021     08/07/2021    CO2 22 08/07/2021    BUN 17 08/07/2021    CREATININE 1.1 08/07/2021    GFRAA >60 08/07/2021    LABGLOM >60 08/07/2021    GLUCOSE 101 08/07/2021    PROT 7.1 08/05/2021    LABALBU 3.9 08/05/2021    CALCIUM 8.9 08/07/2021    BILITOT 1.0 08/05/2021    ALKPHOS 54 08/05/2021    AST 17 08/05/2021    ALT 11 08/05/2021      [id number]}     Current Facility-Administered Medications:     vancomycin (VANCOCIN) 1,250 mg in dextrose 5 % 250 mL IVPB, 1,250 mg, Intravenous, Q24H, Seth Weir MD    [facility]) capsule 0.4 mg, 0.4 mg, Oral, Daily, Seth Weir MD, 0.4 mg at 08/07/21 0920   amiodarone (CORDARONE) tablet 100 mg, 100 mg, Oral, Daily, Tamara Javier MD, 100 mg at 08/07/21 0920    amitriptyline (ELAVIL) tablet 25 mg, 25 mg, Oral, Nightly, Tamara Javier MD, 25 mg at 08/06/21 2311    apixaban (ELIQUIS) tablet 2.5 mg, 2.5 mg, Oral, BID, Tamara Javier MD, 2.5 mg at 08/07/21 3567    atorvastatin (LIPITOR) tablet 10 mg, 10 mg, Oral, Daily, Tamara Javier MD, 10 mg at 08/07/21 0920    ferrous sulfate (IRON 325) tablet 325 mg, 325 mg, Oral, BID, Tamara Javier MD, 325 mg at 08/07/21 0920    losartan (COZAAR) tablet 100 mg, 100 mg, Oral, Daily, Tamara Javier MD, 100 mg at 08/07/21 0920    sodium chloride flush 0.9 % injection 5-40 mL, 5-40 mL, Intravenous, 2 times per day, Tamara Javier MD, 10 mL at 08/07/21 0920    sodium chloride flush 0.9 % injection 5-40 mL, 5-40 mL, Intravenous, PRN, Tamara Javier MD    0.9 % sodium chloride infusion, 25 mL, Intravenous, PRN, Tamara Javier MD    ondansetron (ZOFRAN-ODT) disintegrating tablet 4 mg, 4 mg, Oral, Q8H PRN **OR** ondansetron (ZOFRAN) injection 4 mg, 4 mg, Intravenous, Q6H PRN, Tamara Javier MD    polyethylene glycol (GLYCOLAX) packet 17 g, 17 g, Oral, Daily PRN, Tamara Javier MD    acetaminophen (TYLENOL) tablet 650 mg, 650 mg, Oral, Q6H PRN **OR** acetaminophen (TYLENOL) suppository 650 mg, 650 mg, Rectal, Q6H PRN, Tamara Javier MD    ipratropium-albuterol (DUONEB) nebulizer solution 1 ampule, 1 ampule, Inhalation, Q4H Flor Willis MD, 1 ampule at 08/07/21 1309    cefepime (MAXIPIME) 2000 mg IVPB minibag, 2,000 mg, Intravenous, Q12H, Tamara Javier MD, Stopped at 08/07/21 4018    0.9 % sodium chloride infusion, , Intravenous, Continuous, Tamara Javier MD, Last Rate: 75 mL/hr at 08/07/21 0529, New Bag at 08/07/21 0529    guaiFENesin tablet 400 mg, 400 mg, Oral, TID, Tamara Javier MD, 400 mg at 08/07/21 1421    loperamide (IMODIUM) capsule 2 mg, 2 mg, Oral, 4x Daily PRN, Kristin Burkitt, DO, 2 mg at 08/06/21 1240    budesonide (PULMICORT) nebulizer suspension 250 mcg, 0.25 mg, Nebulization, BID, 250 mcg at 08/07/21 0900 **AND** ipratropium (ATROVENT) 0.02 % nebulizer solution 0.5 mg, 0.5 mg, Nebulization, 4x daily **AND** Arformoterol Tartrate (BROVANA) nebulizer solution 15 mcg, 15 mcg, Nebulization, BID, Kiki Turner MD, 15 mcg at 08/07/21 0901    CT CHEST WO CONTRAST   Final Result   Status post surgical changes in the left upper lobe with persistent loculated   pneumothorax in the left upper lobe. Progressive masses in the lingula and left lower lobe concerning for   progressive malignancy with possible developing mid metastatic involvement in   the right lung. Focal patchy masslike infiltrates in the left lower lobe concerning for   pneumonia or less likely developing additional metastatic malignancy. Continued surveillance is recommended. XR CHEST (2 VW)   Final Result   Surgical changes in the left hemithorax from prior left upper lobectomy,   unchanged since the prior examination. MediPort line in the SVC. Assessment:    Active Problems:    Sepsis ([address]  Resolved Problems:    * No resolved hospital problems. *    71-year-old gentleman known to Dr. Matthew Heimlich with history of invasive adenocarcinoma status post LEONARDO lobectomy and LLL wedge resection 7/20/2020. Bx proven recurrent disease in June 2021. Treated with concurrent chemoradiation therapy with weekly Taxol from 7/16/2021, last treatment 7/30/2021. Admitted with shortness of breath and fever with temperature 101.6. CT chest revealed left lower lobe pneumonia. Plan:  Reviewed CT chest which was compared to April and appears for progressive malignancy. Has LLL masslike density pneumonia versus malignancy. He did not start treatment until July. Will plan to continue concurrent chemoradiation therapy after acute PNA resolves. Will monitor right-sided 4 mm lesoin closely.   Repeat CT chest after completion of chemoRT as outpatient.   Eliquis 2.5 mg BID  Antibiotics Vaco and Cefepime  Cont full supportive care    Electronically signed by Afua Beth MD on 8/7/2021 at 3:57 PM

## 2021-08-07 NOTE — PLAN OF CARE
Problem: Falls - Risk of:  Goal: Will remain free from falls  Description: Will remain free from falls  8/7/2021 1416 by Abigail Irvin RN  Outcome: Met This Shift  8/7/2021 0019 by Mitra Elizondo RN  Outcome: Met This Shift  Goal: Absence of physical injury  Description: Absence of physical injury  8/7/2021 1416 by Abigail Irvin RN  Outcome: Met This Shift  8/7/2021 0019 by Mitra Elizondo RN  Outcome: Met This Shift     Problem:  Activity:  Goal: Ability to tolerate increased activity will improve  Description: Ability to tolerate increased activity will improve  Outcome: Met This Shift

## 2021-08-07 NOTE — PROGRESS NOTES
Pharmacy Consultation Note  (Antibiotic Dosing and Monitoring)    Initial consult date: 2021  Consulting physician: Michelle Meyer MD   Drug: Vancomycin  Indication: Pneumonia     Age/  Gender Height Weight IBW Dosing weight  Allergy Information   71 y.o./male 5' 8.9\" (175 cm) 145 lb (65.8 kg)     Ideal body weight: 70.5 kg (155 lb 5.6 oz)  65.8 kg  Patient has no known allergies. Temp (24hrs), Av.6 °F (36.4 °C), Min:96.5 °F (35.8 °C), Max:98.4 °F (36.9 °C)          Date  WBC BUN SCr CrCl  (mL/min) Drug/Dose Time   Given Level(s)   (Time) Comments   21 3.6 27 1.6  40 mL/min   Vancomycin 2000 mg IV  2108  Dose administered in ED    3.2 26 1.5 43 Vancomycin 1000 mg IV x 1 2358 Level 13.5 mcg/mL @  2.1 17 1.1 58 Vancomycin 1250 mg IV q 24 hr (2100)                      Intake/Output Summary (Last 24 hours) at 2021 1141  Last data filed at 2021 1038  Gross per 24 hour   Intake 2020 ml   Output 3025 ml   Net -1005 ml       Historical Cultures:  No results found for: ORG  Recent Labs     21  0422   BC 24 Hours no growth       Cultures:  available culture and sensitivity results were reviewed in EPIC    Assessment:  · 80 y.o. male has been initiated Vancomycin.   · Estimated CrCl = 43 mL/min  · Goal trough level = 15-20 mcg/mL; goal AUC/CHARMAINE: 400-600  · sCr 1.1, random level 13.5 mcg/mL    Plan:  · Start vancomycin 1250 mg IV q 24 hr (est AUC/CHARMAINE 498, trough 14.3 mcg/mL)  · A vanco trough level will be obtained when steady-state is reached  · Monitor renal function   · Clinical pharmacy to follow      Cayetano Trejo PharmD, BCPS 2021 11:55 AM

## 2021-08-07 NOTE — PLAN OF CARE
Problem: Falls - Risk of:  Goal: Will remain free from falls  Description: Will remain free from falls  8/7/2021 0019 by Brandy Stoner RN  Outcome: Met This Shift  8/6/2021 1846 by Aminata Gutiérrez RN  Outcome: Met This Shift  Goal: Absence of physical injury  Description: Absence of physical injury  8/7/2021 0019 by Brandy Stoner RN  Outcome: Met This Shift  8/6/2021 1846 by Aminata Gutiérrez RN  Outcome: Met This Shift

## 2021-08-07 NOTE — PROGRESS NOTES
Hospitalist Progress Note      PCP: Kenna Colunga DO    Date of Admission: 8/5/2021    Chief Complaint: Shortness of breath fever chills    Hospital Course: 69-year-old white male known to have a history of lung cancer underwent left lobectomy currently receiving radiation and chemotherapy admitted with left lower lobe pneumonia. Responding well to IV antibiotics    Subjective: Feels comfortable today no shortness of breath      Medications:  Reviewed    Infusion Medications    sodium chloride      sodium chloride 75 mL/hr at 08/07/21 0529     Scheduled Medications    vancomycin  1,250 mg Intravenous Q24H    tamsulosin  0.4 mg Oral Daily    amiodarone  100 mg Oral Daily    amitriptyline  25 mg Oral Nightly    apixaban  2.5 mg Oral BID    atorvastatin  10 mg Oral Daily    ferrous sulfate  325 mg Oral BID    losartan  100 mg Oral Daily    sodium chloride flush  5-40 mL Intravenous 2 times per day    ipratropium-albuterol  1 ampule Inhalation Q4H WA    cefepime  2,000 mg Intravenous Q12H    guaiFENesin  400 mg Oral TID    budesonide  0.25 mg Nebulization BID    And    ipratropium  0.5 mg Nebulization 4x daily    And    Arformoterol Tartrate  15 mcg Nebulization BID     PRN Meds: sodium chloride flush, sodium chloride, ondansetron **OR** ondansetron, polyethylene glycol, acetaminophen **OR** acetaminophen, loperamide      Intake/Output Summary (Last 24 hours) at 8/7/2021 1707  Last data filed at 8/7/2021 1421  Gross per 24 hour   Intake 1892.5 ml   Output 1125 ml   Net 767.5 ml       Exam:    BP (!) 103/55   Pulse 80   Temp 97.4 °F (36.3 °C) (Temporal)   Resp 17   Ht 5' 8.9\" (1.75 m)   Wt 145 lb (65.8 kg)   SpO2 99%   BMI 21.48 kg/m²     General: NAD, elderly thin gentleman. HEENT: No thrush or mucositis, EOMI, PERRLA  Heart:  RRR, no murmurs, gallops, or rubs.   Lungs:  Decreased BS bilaterally, no wheeze, rales or rhonchi  Abd: BS present, nontender, nondistended, no masses  Extrem:  No clubbing, cyanosis, or edema  Lymphatics: No palpable adenopathy in cervical and supraclavicular regions  Skin: Intact, no petechia or purpura  Labs:   Recent Labs     08/05/21 2050 08/06/21  0422 08/07/21  0738   WBC 3.6* 3.2* 2.1*   HGB 8.9* 9.0* 7.8*   HCT 27.2* 28.3* 24.2*    183 158     Recent Labs     08/05/21  1753 08/06/21  0422 08/07/21  0738   * 133 135   K 4.7 4.3 4.1   CL 98 99 104   CO2 21* 24 22   BUN 27* 26* 17   CREATININE 1.6* 1.5* 1.1   CALCIUM 9.0 9.0 8.9     Recent Labs     08/05/21  1753   AST 17   ALT 11   BILITOT 1.0   ALKPHOS 54     No results for input(s): INR in the last 72 hours. No results for input(s): Grzegorz [place] in the last 72 hours. Assessment  Pneumonia  Immunocompromised state due to chemotherapy and radiation  Anemia-leukopenia due to chemotherapy    Plan: IV antibiotics vancomycin and cefepime            Supportive care            Hold chemotherapy until pneumonia resolves    Active Hospital Problems    Diagnosis Date Noted    Sepsis ([facility] [A41.9] 08/06/2021         DVT Prophylaxis: Eliquis  Diet: ADULT DIET;  Regular  Code Status: Full Code    PT/OT Eval Status: Pending   dispo -Juanita Crawley MD

## 2021-08-08 LAB
ANION GAP SERPL CALCULATED.3IONS-SCNC: 9 MMOL/L (ref 7–16)
ANISOCYTOSIS: ABNORMAL
BASOPHILS ABSOLUTE: 0.05 E9/L (ref 0–0.2)
BASOPHILS RELATIVE PERCENT: 2.6 % (ref 0–2)
BUN BLDV-MCNC: 11 MG/DL (ref 6–23)
CALCIUM SERPL-MCNC: 8.6 MG/DL (ref 8.6–10.2)
CHLORIDE BLD-SCNC: 107 MMOL/L (ref 98–107)
CO2: 18 MMOL/L (ref 22–29)
CREAT SERPL-MCNC: 1.1 MG/DL (ref 0.7–1.2)
EOSINOPHILS ABSOLUTE: 0.02 E9/L (ref 0.05–0.5)
EOSINOPHILS RELATIVE PERCENT: 0.9 % (ref 0–6)
GFR AFRICAN AMERICAN: >60
GFR NON-AFRICAN AMERICAN: >60 ML/MIN/1.73
GLUCOSE BLD-MCNC: 99 MG/DL (ref 74–99)
HCT VFR BLD CALC: 22.4 % (ref 37–54)
HEMOGLOBIN: 7.2 G/DL (ref 12.5–16.5)
LYMPHOCYTES ABSOLUTE: 0.32 E9/L (ref 1.5–4)
LYMPHOCYTES RELATIVE PERCENT: 14.7 % (ref 20–42)
MCH RBC QN AUTO: 27.4 PG (ref 26–35)
MCHC RBC AUTO-ENTMCNC: 32.1 % (ref 32–34.5)
MCV RBC AUTO: 85.2 FL (ref 80–99.9)
MONOCYTES ABSOLUTE: 0.06 E9/L (ref 0.1–0.95)
MONOCYTES RELATIVE PERCENT: 2.6 % (ref 2–12)
MYELOCYTE PERCENT: 1.7 % (ref 0–0)
NEUTROPHILS ABSOLUTE: 1.66 E9/L (ref 1.8–7.3)
NEUTROPHILS RELATIVE PERCENT: 77.6 % (ref 43–80)
OVALOCYTES: ABNORMAL
PDW BLD-RTO: 15.7 FL (ref 11.5–15)
PLATELET # BLD: 135 E9/L (ref 130–450)
PMV BLD AUTO: 9.4 FL (ref 7–12)
POIKILOCYTES: ABNORMAL
POLYCHROMASIA: ABNORMAL
POTASSIUM REFLEX MAGNESIUM: 4 MMOL/L (ref 3.5–5)
RBC # BLD: 2.63 E12/L (ref 3.8–5.8)
SODIUM BLD-SCNC: 134 MMOL/L (ref 132–146)
WBC # BLD: 2.1 E9/L (ref 4.5–11.5)

## 2021-08-08 PROCEDURE — 6370000000 HC RX 637 (ALT 250 FOR IP): Performed by: FAMILY MEDICINE

## 2021-08-08 PROCEDURE — 6360000002 HC RX W HCPCS: Performed by: FAMILY MEDICINE

## 2021-08-08 PROCEDURE — 2580000003 HC RX 258: Performed by: FAMILY MEDICINE

## 2021-08-08 PROCEDURE — 80048 BASIC METABOLIC PNL TOTAL CA: CPT

## 2021-08-08 PROCEDURE — 94640 AIRWAY INHALATION TREATMENT: CPT

## 2021-08-08 PROCEDURE — 2060000000 HC ICU INTERMEDIATE R&B

## 2021-08-08 PROCEDURE — 85025 COMPLETE CBC W/AUTO DIFF WBC: CPT

## 2021-08-08 PROCEDURE — 36415 COLL VENOUS BLD VENIPUNCTURE: CPT

## 2021-08-08 RX ORDER — AMOXICILLIN AND CLAVULANATE POTASSIUM 875; 125 MG/1; MG/1
1 TABLET, FILM COATED ORAL EVERY 12 HOURS SCHEDULED
Status: DISCONTINUED | OUTPATIENT
Start: 2021-08-08 | End: 2021-08-09 | Stop reason: HOSPADM

## 2021-08-08 RX ADMIN — LOSARTAN POTASSIUM 100 MG: 50 TABLET, FILM COATED ORAL at 07:29

## 2021-08-08 RX ADMIN — ATORVASTATIN CALCIUM 10 MG: 10 TABLET, FILM COATED ORAL at 07:29

## 2021-08-08 RX ADMIN — IPRATROPIUM BROMIDE AND ALBUTEROL SULFATE 1 AMPULE: .5; 3 SOLUTION RESPIRATORY (INHALATION) at 09:17

## 2021-08-08 RX ADMIN — APIXABAN 2.5 MG: 2.5 TABLET, FILM COATED ORAL at 07:30

## 2021-08-08 RX ADMIN — APIXABAN 2.5 MG: 2.5 TABLET, FILM COATED ORAL at 19:43

## 2021-08-08 RX ADMIN — TAMSULOSIN HYDROCHLORIDE 0.4 MG: 0.4 CAPSULE ORAL at 07:30

## 2021-08-08 RX ADMIN — FERROUS SULFATE TAB 325 MG (65 MG ELEMENTAL FE) 325 MG: 325 (65 FE) TAB at 19:42

## 2021-08-08 RX ADMIN — AMOXICILLIN AND CLAVULANATE POTASSIUM 1 TABLET: 875; 125 TABLET, FILM COATED ORAL at 19:42

## 2021-08-08 RX ADMIN — FERROUS SULFATE TAB 325 MG (65 MG ELEMENTAL FE) 325 MG: 325 (65 FE) TAB at 07:29

## 2021-08-08 RX ADMIN — CEFEPIME HYDROCHLORIDE 2000 MG: 2 INJECTION, POWDER, FOR SOLUTION INTRAVENOUS at 06:25

## 2021-08-08 RX ADMIN — ARFORMOTEROL TARTRATE 15 MCG: 15 SOLUTION RESPIRATORY (INHALATION) at 09:17

## 2021-08-08 RX ADMIN — ARFORMOTEROL TARTRATE 15 MCG: 15 SOLUTION RESPIRATORY (INHALATION) at 20:19

## 2021-08-08 RX ADMIN — IPRATROPIUM BROMIDE AND ALBUTEROL SULFATE 1 AMPULE: .5; 3 SOLUTION RESPIRATORY (INHALATION) at 12:55

## 2021-08-08 RX ADMIN — GUAIFENESIN 400 MG: 400 TABLET ORAL at 15:17

## 2021-08-08 RX ADMIN — SODIUM CHLORIDE: 9 INJECTION, SOLUTION INTRAVENOUS at 15:17

## 2021-08-08 RX ADMIN — IPRATROPIUM BROMIDE AND ALBUTEROL SULFATE 1 AMPULE: .5; 3 SOLUTION RESPIRATORY (INHALATION) at 20:18

## 2021-08-08 RX ADMIN — IPRATROPIUM BROMIDE AND ALBUTEROL SULFATE 1 AMPULE: .5; 3 SOLUTION RESPIRATORY (INHALATION) at 16:19

## 2021-08-08 RX ADMIN — BUDESONIDE 250 MCG: 0.25 SUSPENSION RESPIRATORY (INHALATION) at 20:20

## 2021-08-08 RX ADMIN — GUAIFENESIN 400 MG: 400 TABLET ORAL at 19:42

## 2021-08-08 RX ADMIN — AMITRIPTYLINE HYDROCHLORIDE 25 MG: 25 TABLET, FILM COATED ORAL at 19:42

## 2021-08-08 RX ADMIN — GUAIFENESIN 400 MG: 400 TABLET ORAL at 07:29

## 2021-08-08 RX ADMIN — AMIODARONE HYDROCHLORIDE 100 MG: 200 TABLET ORAL at 07:29

## 2021-08-08 RX ADMIN — BUDESONIDE 250 MCG: 0.25 SUSPENSION RESPIRATORY (INHALATION) at 09:17

## 2021-08-08 ASSESSMENT — PAIN SCALES - GENERAL
PAINLEVEL_OUTOF10: 0

## 2021-08-08 NOTE — PROGRESS NOTES
Hospitalist Progress Note      PCP: Cristopher Arambula DO    Date of Admission: 8/5/2021    Chief Complaint: Shortness of breath fever chills    Hospital Course: 52-year-old white male known to have a history of lung cancer underwent left lobectomy currently receiving radiation and chemotherapy admitted with left lower lobe pneumonia. Responding well to IV antibiotics    Subjective:   Feels comfortable today no shortness of breath  reports feeling pretty good overall and anxious to go back home      Medications:  Reviewed    Infusion Medications    sodium chloride      sodium chloride 75 mL/hr at 08/07/21 0529     Scheduled Medications    vancomycin  1,250 mg Intravenous Q24H    tamsulosin  0.4 mg Oral Daily    amiodarone  100 mg Oral Daily    amitriptyline  25 mg Oral Nightly    apixaban  2.5 mg Oral BID    atorvastatin  10 mg Oral Daily    ferrous sulfate  325 mg Oral BID    losartan  100 mg Oral Daily    sodium chloride flush  5-40 mL Intravenous 2 times per day    ipratropium-albuterol  1 ampule Inhalation Q4H WA    cefepime  2,000 mg Intravenous Q12H    guaiFENesin  400 mg Oral TID    budesonide  0.25 mg Nebulization BID    And    ipratropium  0.5 mg Nebulization 4x daily    And    Arformoterol Tartrate  15 mcg Nebulization BID     PRN Meds: sodium chloride flush, sodium chloride, ondansetron **OR** ondansetron, polyethylene glycol, acetaminophen **OR** acetaminophen, loperamide      Intake/Output Summary (Last 24 hours) at 8/8/2021 0923  Last data filed at 8/8/2021 0910  Gross per 24 hour   Intake 2096.25 ml   Output 1250 ml   Net 846.25 ml       Exam:    BP (!) 112/53   Pulse 86   Temp 97 °F (36.1 °C) (Temporal)   Resp 16   Ht 5' 8.9\" (1.75 m)   Wt 145 lb (65.8 kg)   SpO2 93%   BMI 21.48 kg/m²     General: NAD, elderly thin gentleman. HEENT: No thrush or mucositis, EOMI, PERRL  Heart:  RRR, no murmurs, gallops, or rubs.   Lungs:  Decreased BS bilaterally, no wheeze, rales or rhonchi, unlabored respirations  Abd: BS present, nontender, nondistended, no masses  Extrem:  No clubbing, cyanosis, or edema  Lymphatics: No palpable adenopathy in cervical and supraclavicular regions  Skin: Intact, no petechia or purpura    Labs:   Recent Labs     08/05/21  2050 08/06/21  0422 08/07/21  0738   WBC 3.6* 3.2* 2.1*   HGB 8.9* 9.0* 7.8*   HCT 27.2* 28.3* 24.2*    183 158     Recent Labs     08/05/21  1753 08/06/21  0422 08/07/21  0738   * 133 135   K 4.7 4.3 4.1   CL 98 99 104   CO2 21* 24 22   BUN 27* 26* 17   CREATININE 1.6* 1.5* 1.1   CALCIUM 9.0 9.0 8.9     Recent Labs     08/05/21  1753   AST 17   ALT 11   BILITOT 1.0   ALKPHOS 54     No results for input(s): INR in the last 72 hours. No results for input(s): Kathleen Román in the last 72 hours. Assessment:  Sepsis secondary to pneumonia  Community acquired Pneumonia  Immunocompromised state due to chemotherapy and radiation  Anemia-leukopenia due to chemotherapy  Urinary retention    Plan:     IV antibiotics vancomycin and cefepime  Supportive care  Hold chemotherapy until pneumonia resolves  Oncology following, supportive care  Vital signs stable on room air  Voiding trial without perla  Will transition to oral antibiotic, potential discharge home tomorrow    DVT Prophylaxis: Eliquis  Diet: ADULT DIET;  Regular  Code Status: Full Code    PT/OT Eval Status: Pending   dispo -[phone] [place]

## 2021-08-08 NOTE — PROGRESS NOTES
daughter and may request assistance in completing. Pt also considering his code status as well and pamphlet provided for his and his daughter's review. Pt states that he has considered decisions such as these in light of several family losses in the past year. He is pleased to be living with his daughter and her family and was agreeable to LSW contacting his daughter as well to discuss documents. LSW contacted pt's daughter, Efrain Goldstein and discussed above. She will talk more with pt tomorrow about his wishes and documents themselves. Efrain Goldstein is very involved in pt's care. She is also aware PM available for support as needed. Plan: Assist with Advance Directives documents as needed.

## 2021-08-08 NOTE — PLAN OF CARE
Problem: Falls - Risk of:  Goal: Will remain free from falls  Description: Will remain free from falls  Outcome: Met This Shift  Goal: Absence of physical injury  Description: Absence of physical injury  Outcome: Met This Shift     Problem:  Activity:  Goal: Ability to tolerate increased activity will improve  Description: Ability to tolerate increased activity will improve  Outcome: Met This Shift

## 2021-08-08 NOTE — PLAN OF CARE
Problem: Falls - Risk of:  Goal: Will remain free from falls  Description: Will remain free from falls  8/7/2021 2315 by Sherryle Pottier, RN  Outcome: Met This Shift  8/7/2021 1416 by Gordo Danielle RN  Outcome: Met This Shift  Goal: Absence of physical injury  Description: Absence of physical injury  8/7/2021 2315 by Sherryle Pottier, RN  Outcome: Met This Shift  8/7/2021 1416 by Gordo Danielle RN  Outcome: Met This Shift     Problem:  Activity:  Goal: Ability to tolerate increased activity will improve  Description: Ability to tolerate increased activity will improve  8/7/2021 2315 by Sherryle Pottier, RN  Outcome: Met This Shift  8/7/2021 1416 by Gordo Danielle RN  Outcome: Met This Shift

## 2021-08-09 ENCOUNTER — HOSPITAL ENCOUNTER (OUTPATIENT)
Dept: RADIATION ONCOLOGY | Age: 71
End: 2021-08-09
Attending: RADIOLOGY
Payer: COMMERCIAL

## 2021-08-09 VITALS
WEIGHT: 145 LBS | TEMPERATURE: 97.9 F | DIASTOLIC BLOOD PRESSURE: 65 MMHG | RESPIRATION RATE: 18 BRPM | SYSTOLIC BLOOD PRESSURE: 146 MMHG | BODY MASS INDEX: 21.48 KG/M2 | OXYGEN SATURATION: 92 % | HEIGHT: 69 IN | HEART RATE: 84 BPM

## 2021-08-09 LAB
ANION GAP SERPL CALCULATED.3IONS-SCNC: 12 MMOL/L (ref 7–16)
ANISOCYTOSIS: ABNORMAL
BASOPHILS ABSOLUTE: 0.07 E9/L (ref 0–0.2)
BASOPHILS RELATIVE PERCENT: 2.6 % (ref 0–2)
BUN BLDV-MCNC: 9 MG/DL (ref 6–23)
CALCIUM SERPL-MCNC: 9.1 MG/DL (ref 8.6–10.2)
CHLORIDE BLD-SCNC: 108 MMOL/L (ref 98–107)
CO2: 22 MMOL/L (ref 22–29)
CREAT SERPL-MCNC: 1 MG/DL (ref 0.7–1.2)
EOSINOPHILS ABSOLUTE: 0.02 E9/L (ref 0.05–0.5)
EOSINOPHILS RELATIVE PERCENT: 0.9 % (ref 0–6)
GFR AFRICAN AMERICAN: >60
GFR NON-AFRICAN AMERICAN: >60 ML/MIN/1.73
GLUCOSE BLD-MCNC: 95 MG/DL (ref 74–99)
HCT VFR BLD CALC: 25.9 % (ref 37–54)
HEMOGLOBIN: 8.2 G/DL (ref 12.5–16.5)
HYPOCHROMIA: ABNORMAL
LYMPHOCYTES ABSOLUTE: 0.3 E9/L (ref 1.5–4)
LYMPHOCYTES RELATIVE PERCENT: 12.2 % (ref 20–42)
MCH RBC QN AUTO: 27.9 PG (ref 26–35)
MCHC RBC AUTO-ENTMCNC: 31.7 % (ref 32–34.5)
MCV RBC AUTO: 88.1 FL (ref 80–99.9)
MONOCYTES ABSOLUTE: 0.25 E9/L (ref 0.1–0.95)
MONOCYTES RELATIVE PERCENT: 10.4 % (ref 2–12)
MYELOCYTE PERCENT: 0.9 % (ref 0–0)
NEUTROPHILS ABSOLUTE: 1.85 E9/L (ref 1.8–7.3)
NEUTROPHILS RELATIVE PERCENT: 73 % (ref 43–80)
OVALOCYTES: ABNORMAL
PDW BLD-RTO: 15.9 FL (ref 11.5–15)
PLATELET # BLD: 168 E9/L (ref 130–450)
PMV BLD AUTO: 9.6 FL (ref 7–12)
POIKILOCYTES: ABNORMAL
POLYCHROMASIA: ABNORMAL
POTASSIUM SERPL-SCNC: 4.1 MMOL/L (ref 3.5–5)
RBC # BLD: 2.94 E12/L (ref 3.8–5.8)
SODIUM BLD-SCNC: 142 MMOL/L (ref 132–146)
WBC # BLD: 2.5 E9/L (ref 4.5–11.5)

## 2021-08-09 PROCEDURE — 36415 COLL VENOUS BLD VENIPUNCTURE: CPT

## 2021-08-09 PROCEDURE — 6360000002 HC RX W HCPCS: Performed by: FAMILY MEDICINE

## 2021-08-09 PROCEDURE — 97165 OT EVAL LOW COMPLEX 30 MIN: CPT

## 2021-08-09 PROCEDURE — 94640 AIRWAY INHALATION TREATMENT: CPT

## 2021-08-09 PROCEDURE — 6370000000 HC RX 637 (ALT 250 FOR IP): Performed by: FAMILY MEDICINE

## 2021-08-09 PROCEDURE — 97161 PT EVAL LOW COMPLEX 20 MIN: CPT

## 2021-08-09 PROCEDURE — 99231 SBSQ HOSP IP/OBS SF/LOW 25: CPT | Performed by: FAMILY MEDICINE

## 2021-08-09 PROCEDURE — 80048 BASIC METABOLIC PNL TOTAL CA: CPT

## 2021-08-09 PROCEDURE — 85025 COMPLETE CBC W/AUTO DIFF WBC: CPT

## 2021-08-09 RX ORDER — AMOXICILLIN AND CLAVULANATE POTASSIUM 875; 125 MG/1; MG/1
1 TABLET, FILM COATED ORAL EVERY 12 HOURS SCHEDULED
Qty: 14 TABLET | Refills: 0 | Status: SHIPPED | OUTPATIENT
Start: 2021-08-09 | End: 2021-08-16

## 2021-08-09 RX ADMIN — APIXABAN 2.5 MG: 2.5 TABLET, FILM COATED ORAL at 10:16

## 2021-08-09 RX ADMIN — BUDESONIDE 250 MCG: 0.25 SUSPENSION RESPIRATORY (INHALATION) at 08:33

## 2021-08-09 RX ADMIN — IPRATROPIUM BROMIDE AND ALBUTEROL SULFATE 1 AMPULE: .5; 3 SOLUTION RESPIRATORY (INHALATION) at 08:33

## 2021-08-09 RX ADMIN — IPRATROPIUM BROMIDE AND ALBUTEROL SULFATE 1 AMPULE: .5; 3 SOLUTION RESPIRATORY (INHALATION) at 12:31

## 2021-08-09 RX ADMIN — AMOXICILLIN AND CLAVULANATE POTASSIUM 1 TABLET: 875; 125 TABLET, FILM COATED ORAL at 10:15

## 2021-08-09 RX ADMIN — LOSARTAN POTASSIUM 100 MG: 50 TABLET, FILM COATED ORAL at 10:16

## 2021-08-09 RX ADMIN — TAMSULOSIN HYDROCHLORIDE 0.4 MG: 0.4 CAPSULE ORAL at 10:16

## 2021-08-09 RX ADMIN — ATORVASTATIN CALCIUM 10 MG: 10 TABLET, FILM COATED ORAL at 10:16

## 2021-08-09 RX ADMIN — AMIODARONE HYDROCHLORIDE 100 MG: 200 TABLET ORAL at 10:16

## 2021-08-09 RX ADMIN — GUAIFENESIN 400 MG: 400 TABLET ORAL at 10:15

## 2021-08-09 RX ADMIN — FERROUS SULFATE TAB 325 MG (65 MG ELEMENTAL FE) 325 MG: 325 (65 FE) TAB at 10:00

## 2021-08-09 RX ADMIN — ARFORMOTEROL TARTRATE 15 MCG: 15 SOLUTION RESPIRATORY (INHALATION) at 08:33

## 2021-08-09 NOTE — PROGRESS NOTES
Palliative Care Department  [phone]  Palliative Care Progress Note  Provider Umer Giordano, [address] [facility]  [id number]  Hospital Day: 5  Date of Initial Consult: 8/6/2021  Referring Provider: Raisa Kulkarni MD  Palliative Medicine was consulted for assistance with: Missy Bonilla  Chief Complaint Today:  diarrhea    Brief Summary of Hospital Course:   Regina Abbasi is a 80 y.o. with a medical history of HTN, HLD, former tobacco use disorder, AJCC stage group III lung adenocarcinoma s/p LEONARDO lobectomy and LLL wedge resection who was admitted on 8/5/2021 from home with a CHIEF COMPLAINT of SOB. ASSESSMENT/PLAN:     Recommendations:     Diarrhea:   -  Possibly related to chemo, started this week   -  Prn immodium  Urinary retention:   -  Trial void   -  Defer to primary team for management    Pertinent Hospital Problems      Community acquired LLL pneumonia in setting of chemo/radiation   ALBAN   Recurrence of lung adenocarcinoma currently on chemoradiation   Elevated troponin   Atrial fibrillation on Eliquis   Hypertension   Normocytic anemia likely secondary to chronic disease   COPD      Palliative Care Encounter / Counseling Regarding Goals of Care  Please see detailed goals of care discussion as below   At this time, Regina Abbasi, Does have capacity for medical decision-making. Capacity is time limited and situation/question specific   Outcome of goals of care meeting: continue current treatment, wants to restart chemoradiation as soon as possible   Code status Full Code   Advanced Directives: no POA or living will in [facility]   Surrogate/Legal NOK:  o Daughter Andrés Mcpherson [phone]    Spiritual assessment: no spiritual distress identified  Bereavement and grief: to be determined  Referrals to: none today    SUBJECTIVE:     Details of Conversation:  Chart reviewed. Evaluated patient at bedside. Denies dyspnea, on room air. States he is feeling good. Hopeful to be d/c home today.   Has DNR pamphlet at bedside, states he wants to discuss further with his daughter before making a decision. Physical Function:  PPS: 80    History Of Present Illness:    Per H&P  \"79 y.o. male with past medical history of HTN, HLD, A. Fib, lung CA (s/p left upper lobectomy) and GERD . He presents to the ED due of shortness of breath for the last 24 hours . He states that it worse with activity . Patient had radiatio today . He states that he has been coughing . He reports chills and feeling warm . He denies any chest pain nausea vomiting or diarrhea. In the ED he was febrile at 101.6 blood pressure 107/53 `   88% on room  RR 18 pulse 70 . WBC 3.6 HGB 8.9 Lactic acid 1.1  creatinine 1.6  Trop 24 >>23 CXR no acute process . CT Chest revealed left lower lobe pneumonia \"      REVIEW OF SYSTEMS:   Pertinent positives as noted in the HPI. All other systems reviewed and negative.     OBJECTIVE:   Prognosis: depends upon goals and unknown    Physical Exam:  BP (!) 146/65   Pulse 84   Temp 97.9 °F (36.6 °C) (Temporal)   Resp 18   Ht 5' 8.9\" (1.75 m)   Wt 145 lb (65.8 kg)   SpO2 92%   BMI 21.48 kg/m²     Constitutional:  Elderly, thin, NAD, awake, alert, resting in bed  Eyes: no scleral icterus, normal lids, no discharge  ENMT:  Normocephalic, atraumatic, mucosa moist, EOMI  Neck:  trachea midline, no JVD  Lungs:  CTA bilaterally, no audible rhonchi or wheezes noted, respirations unlabored, no retractions, on room air  Heart[de-identified]  RRR, distant heart tones, no murmur, rub, or gallop noted during exam  Abd:  Soft, non tender, non distended, bowel sounds present  :  Deferred, has perla  MSK: sarcopenia present, loss of muscle mass with temple muscle wasting  Ext:  Moving all extremities, no edema, pulses present  Skin:  Warm and dry, no rashes on visible skin  Psych: non-anxious affect  Neuro:  PERRL, Alert, grossly nonfocal; following commands    Objective data reviewed: labs, images, records, medication use, vitals and chart    Labs:     Recent Labs     08/07/21  0738 08/08/21  1029 08/09/21  0711   WBC 2.1* 2.1* 2.5*   HGB 7.8* 7.2* 8.2*   HCT 24.2* 22.4* 25.9*    135 168     Recent Labs     08/07/21  0738 08/08/21  1029 08/09/21  0711    134 142   K 4.1 4.0 4.1    107 108*   CO2 22 18* 22   BUN 17 11 9   CREATININE 1.1 1.1 1.0   CALCIUM 8.9 8.6 9.1     No results for input(s): AST, ALT, BILIDIR, BILITOT, ALKPHOS in the last 72 hours. No results for input(s): INR in the last 72 hours. No results for input(s): Dari Basques in the last 72 hours. Urinalysis:    No results found for: Joy Tate, BACTERIA, [address] [place] [address], BLOODU, [place] [address], GLUCOSEU      Discussed patient and the plan of care with the other IDT members: Palliative Medicine IDT Team, Patient and Family    Time/Communication  Greater than 50% of time spent, total 15 minutes in counseling and coordination of care at the bedside regarding goals of care, symptom management, diagnosis and prognosis and see above. Thank you for allowing Palliative Medicine to participate in the care of Bobby Ledezma

## 2021-08-09 NOTE — PLAN OF CARE
Problem: Falls - Risk of:  Goal: Will remain free from falls  Description: Will remain free from falls  8/8/2021 2114 by Rika Bowling RN  Outcome: Met This Shift  8/8/2021 1555 by Ese Steven RN  Outcome: Met This Shift  Goal: Absence of physical injury  Description: Absence of physical injury  8/8/2021 2114 by Rika Bowling RN  Outcome: Met This Shift  8/8/2021 1555 by Ese Steven RN  Outcome: Met This Shift

## 2021-08-09 NOTE — HOME CARE
[facility] received referral. Will follow after discharge. Spoke with patient and verified demographics. Russ Mckeon LPN, [facility].

## 2021-08-09 NOTE — PROGRESS NOTES
Date:8/9/2021                                             Patient Harley Elkins  AEJ: [id number]  LPQ: 8/26/2290  SNYX: [id number]     Evaluating OT: ARPITA Simms  Supervising therapist: KATRINA Ferguson, OTR/L; #[id number]     Referring Davis Pritchett MD  Specific Provider Orders/Date: OT Evaluation and Treatment, 08/07/21      Diagnosis: ALBAN (acute kidney injury) ([facility] [N17.9]  Acute respiratory failure with hypoxia ([address] [J96.01]  Sepsis ([facility] [A41.9]  Sepsis due to pneumonia ([facility] [J18.9, A41.9]  Surgery: None  Pertinent Medical History: HTN, hyperlipidemia, lung cancer,Treated with chemoradiation weekly (7/16/2021), AFIB, Gastroesophageal reflux disease without esophagitis, s/p lobectomy of lung     Precautions:  Fall Risk, tele        Assessment of current deficits   [x]? ? Functional mobility             []? ?ADLs           []? ? Strength                  []? ? Cognition   []? ? Functional transfers           []? ? IADLs         []? ? Safety Awareness   [x]? ?Endurance   []? ? Fine Coordination              [x]? ? Balance      []? ? Vision/perception   []? ? Sensation     []? ?Gross Motor Coordination  []?? ROM           []? ? Delirium                   []? ? Motor Control      OT PLAN OF CARE   OT POC based on physician orders, patient diagnosis and results of clinical assessment     Recommended Adaptive Equipment: TBD pending discharge plan      Home Living: Patient lives with daughter in 2 story house with no steps to enter. Bedroom is located on 1 floor. Bathroom is located on the 2 floor. Bathroom setup: tub/shower with extended tub bench and standard commode.   Equipment owned: spc, ww, extended tub bench     Prior Level of Function: I/Mod I with ADLs.  Shared with IADLs. Patient reported he completes cooking and daughter completes laundry. Patient completed functional mobility using spc.   Driving: No  Occupation: Retired Self-employed     Pain Level: 0/10  Cognition: A&O: 4/4; Follows 1-3 step directions              Memory:  Good              Sequencing:  Fair+              Problem solving:  Good  Judgement/safety:  Fair+  Patient completed one functional task without spc. Patient educated to use spc for safety when completing short functional distances.                Functional Assessment:  AM-PAC Daily Activity Raw Score: 24/24      Initial Eval Status  Date: 8/9/21   Feeding Independent    Grooming Modified [place]  Washed hands standing at sink with spc    UB Dressing Independent    LB Dressing Independent   Don/doffed socks seated EOB.    Bathing Modified [place]  Seated for safety   Toileting Modified [place], simulated with use of grab bars   Bed Mobility  Supine to sit: Modified [place]   Sit to supine: Modified [place]   With HOB elevated   Functional Transfers Sit <> stand various surfaces: Modified [place] with spc    Functional Mobility Modified [place] with spc  EOB > commode > functional household distance > EOB   Balance Sitting:     Static: Indep.    Dynamic: Indep. Standing: Mod I with spc  Dynamic Standing: Mod I with/w/o spc Patient competed functional reach task with out spc. Patient educated for safety and balance to use spc for functional mobility. Activity Tolerance Fair  Patient educated and demonstrated pursed lip breathing throughout session with verbal cues. Visual/  Perceptual Glasses: Yes, donned               Hand Dominance: R    AROM (PROM) Strength Additional Info:    RUE  WFL : 4/5 Good FMC/dexterity noted during ADL tasks         LUE WFL    : 4/5 Good FMC/dexterity noted during ADL tasks         Hearing: WFL   Sensation:  No c/o numbness or tingling   Tone: WFL   Edema: None observed B UE.     Vitals:  On RA  At rest: SpO2 sat 94%, HR 110 bpm  During Activity: SpO2 sat 91%, HR 120 bpm  At rest: HmM5sea 94%, HR 111 bpm        Comments: Nursing approved OT session.  Upon arrival patient semi-supine in bed, agreeable to session.  At end of session, patient semi-supine in bed with call light and phone within reach, all lines and tubes intact. OT evaluation performed with education provided regarding the purpose and benefits of OT session, along with mobility and I/ADL completion. Overall patient demonstrated ability to complete ADL/functional transfer/mobility tasks safety and functionally independently. Patient educated using tub-bench when showering, using spc during all balance/functional mobility for safety, and participating in pursed-lip breathing for energy conservation with verbalizing good understanding. Patient is able to complete functional mobility/ transfers and ADLs safetly at his baseline and would not benefit from further OT services. Patient stated that he had no further questions or concerns at this time and verbalized good understanding that if anything changes, he can request  occupational therapy services.      Patient / Connor Choi return home     Patient and/or family were instructed on functional diagnosis, prognosis/goals and OT plan of care.  Demonstrated good understanding.      Eval Complexity: Low     Time In: 10:51  Time Out: 11:04  Total Treatment Time: n/a minutes    Min Units   OT Eval Low 52990  x  1   OT Eval Medium 49029       OT Eval High 13931       OT Re-Eval 62361       Therapeutic Ex 26502       Therapeutic Activities 30739       ADL/Self Care 73575       Orthotic Management 67965       Neuro Re-Ed 33119       Non-Billable Time                Evaluation Time includes thorough review of current medical information, gathering information on past medical history/social history and prior level of function, completion of standardized testing/informal observation of tasks, assessment of data and education on plan of care and goals.           Evaluating OT: [place], ARPITA  Supervising therapist: KATRINA Santoyo, OTR/L; #[id number]

## 2021-08-09 NOTE — PROGRESS NOTES
ASSESSMENT:    Conditions Requiring Skilled Therapeutic Intervention: NA    []Decreased strength     []Decreased ROM  []Decreased functional mobility  []Decreased balance   []Decreased endurance   []Decreased posture  []Decreased sensation  []Decreased coordination   []Decreased vision  []Decreased safety awareness   []Increased pain       Comments:  Patient cleared by RN and agreeable to treatment. Patient found in semi Dean's and able to perform bed mobility independently. Patient assisted to self to seated EOB and denied dizziness with positional change. Patient demonstrated mild instability with transfers and gait without AD. Once using SPC, balance and safety improved. Patient demonstrated mares gait speed with equal stride length and steady with use of SPC. Patient with mild SOB following gait and SpO2 monitored and documented above. Patient assisted self back to semi Dean's with call light and tray table in reach. Patient with no acute PT needs. Pt's/ family goals   1. To go home soon.     PHYSICAL THERAPY PLAN OF CARE:    PT POC is established based on physician order and patient diagnosis     Referring provider/PT Order:    08/07/21 1730  PT eval and treat Start: 08/07/21 1730, End: 08/07/21 1730, ONE TIME, Standing Count: 1 Occurrences, R      Lester Early MD     Diagnosis:  ALBAN (acute kidney injury) ([facility] [address] [N17.9]  Acute respiratory failure with hypoxia ([address] [J96.01]  Sepsis ([address] [A41.9]  Sepsis due to pneumonia ([facility] [address] [J18.9, A41.9]  Specific instructions for next treatment:  NA    Current Treatment Recommendations:     [] Strengthening to improve independence with functional mobility   [] ROM to improve independence with functional mobility   [] Balance Training to improve static/dynamic balance and to reduce fall risk  [] Endurance Training to improve activity tolerance during functional mobility   [] Transfer Training to improve safety and independence with all functional transfers   [] Gait Training to improve gait mechanics, endurance and asses need for appropriate assistive device  [] Stair Training in preparation for safe discharge home and/or into the community   [] Positioning to prevent skin breakdown and contractures  [] Safety and Education Training   [] Patient/Caregiver Education   [] HEP  [x] Other No acute PT needs    PT orders will be discontinued as patient with no acute PT needs. Patient able to perform functional tasks assessed with good safety and with supervision or independently. Thank you for the opportunity to assist in the care of this patient. Time in  0945  Time out  0955    Total Treatment Time  0 minutes     Evaluation Time includes thorough review of current medical information, gathering information on past medical history/social history and prior level of function, completion of standardized testing/informal observation of tasks, assessment of data and education on plan of care and goals.     CPT codes:  [x] Low Complexity PT evaluation 52754  [] Moderate Complexity PT evaluation 55493  [] High Complexity PT evaluation 17116  [] PT Re-evaluation 44167  [] Gait training 77259 - minutes  [] Manual therapy 34428 - minutes  [] Therapeutic activities 01012 - minutes  [] Therapeutic exercises 01031 - minutes  [] Neuromuscular reeducation 87162 - minutes     Shahla Callejas, PT, DPT  License [id number]

## 2021-08-09 NOTE — DISCHARGE SUMMARY
Hospital Medicine Discharge Summary    Patient ID: Nilda Castaneda      Patient's PCP: Ruma Austin DO    Admit Date: 8/5/2021     Discharge Date: 8/9/2021     Admitting Physician: Nura Ruelas MD     Discharge Physician: Tj Stone MD     Discharge Diagnoses: Active Hospital Problems    Diagnosis Date Noted    Sepsis [facility]) [A41.9] 08/06/2021     Sepsis secondary to pneumonia  Community acquired Pneumonia  Acute renal failure, resolved  Immunocompromised state due to chemotherapy and radiation  Anemia/leukopenia due to chemotherapy  Urinary retention  Mild protein calorie malnutrition      The patient was seen and examined on day of discharge and this discharge summary is in conjunction with any daily progress note from day of discharge. Hospital Course: This is a 26-year-old male with past medical history of lung cancer status post left lobectomy, on chemoradiation therapy; Hypertension, atrial fibrillation, hyperlipidemia who presented to the emergency department with shortness of breath, decreased exercise tolerance. On arrival to the emergency department, he was febrile with temperature 101.2 °F, hypoxic with oxygen saturation at 88% on room air. CT chest showed left lower lobe pneumonia. Patient was started on IV antibiotics; respiratory status gradually improved and he was completely weaned off supplemental oxygen. Patient has been discharged home in good condition with Rx for oral antibiotics to complete course of treatment. He is to follow-up with primary care physician, oncologist for further outpatient management        Exam:     BP (!) 146/65   Pulse 84   Temp 97.9 °F (36.6 °C) (Temporal)   Resp 18   Ht 5' 8.9\" (1.75 m)   Wt 145 lb (65.8 kg)   SpO2 92%   BMI 21.48 kg/m²     General appearance: Sitting comfortably in bed. No apparent distress. Respiratory: Clear to auscultation bilaterally. Cardiovascular: Normal S1/S2. Regular rhythm and rate.   Abdomen: Soft, no tenderness, non-distended with normal bowel sounds. Musculoskeletal: No clubbing, cyanosis or edema bilaterally. Neurologic:  No focal deficit. Psychiatric: Alert and oriented, thought content appropriate, normal insight        Consults:     IP CONSULT TO INTERNAL MEDICINE  IP CONSULT TO PALLIATIVE CARE  IP CONSULT TO ONCOLOGY  IP CONSULT TO HOME CARE NEEDS      Disposition: Home    Condition at Discharge: Stable    Discharge Instructions/Follow-up: Oncologist, PCP    Code Status:  Full Code     Activity: activity as tolerated    Diet: regular diet    Labs:  For convenience and continuity at follow-up the following most recent labs are provided:      CBC:    Lab Results   Component Value Date    WBC 2.5 08/09/2021    HGB 8.2 08/09/2021    HCT 25.9 08/09/2021     08/09/2021       Renal:    Lab Results   Component Value Date     08/09/2021    K 4.1 08/09/2021    K 4.0 08/08/2021     08/09/2021    CO2 22 08/09/2021    BUN 9 08/09/2021    CREATININE 1.0 08/09/2021    CALCIUM 9.1 08/09/2021       Discharge Medications:     Discharge Medication List as of 8/9/2021  2:12 PM      START taking these medications    Details   amoxicillin-clavulanate (AUGMENTIN) 875-125 MG per tablet Take 1 tablet by mouth every 12 hours for 7 days, Disp-14 tablet, R-0Normal         CONTINUE these medications which have NOT CHANGED    Details   fluticasone-umeclidin-vilant (TRELEGY ELLIPTA) 100-62.5-25 MCG/INH AEPB Inhale 1 puff into the lungs dailyHistorical Med      ferrous sulfate (FE TABS) 325 (65 Fe) MG EC tablet Take 1 tablet by mouth 2 times daily, Disp-90 tablet, R-3Print      losartan (COZAAR) 100 MG tablet Take 1 tablet by mouth daily, Disp-30 tablet, R-5Normal      atorvastatin (LIPITOR) 10 MG tablet Take 1 tablet by mouth daily, Disp-30 tablet, R-5Normal      amiodarone (PACERONE) 100 MG tablet Take 1 tablet by mouth daily, Disp-30 tablet, R-3Normal      apixaban (ELIQUIS) 2.5 MG TABS tablet Take 2.5 mg by mouth 2 times dailyHistorical Med         STOP taking these medications       promethazine (PHENERGAN) 12.5 MG tablet Comments:   Reason for Stopping:         alfuzosin (UROXATRAL) 10 MG extended release tablet Comments:   Reason for Stopping:         amitriptyline (ELAVIL) 25 MG tablet Comments:   Reason for Stopping:               Time Spent on discharge is more than 30 minutes in the examination, evaluation, counseling and review of medications and discharge plan. Signed:    Jeniffer Valladares MD   8/9/2021      Thank you Luis Fernando To DO for the opportunity to be involved in this patient's care. If you have any questions or concerns please feel free to contact me at [phone].

## 2021-08-09 NOTE — CARE COORDINATION
Received call from daughter-Jordana, who would like pt to stay at hospital to continue treatment due to pt susceptible to pneumonia. Explained that pt discharges when pt can continue treatment outside of hospital. Daughter expressed understanding. Spoke with both PT/OT to evaluate pt, both saw pt and recommend home with hhc. Daughter choice was [address] if hhc is needed. Referral to [facility]. Called daughterEufemia to updated on therapy recommendations. [facility] states that pt lives with daughter, has a cane that he uses mostly, also has a bed side commode. Plan is home with daughter at discharge, and [address] [facility] for skilled nursing/therapy. Pt discharging today, notified [facility] of discharge/orders. KAREEM Marin, SARAH  . The Plan for Transition of Care is related to the following treatment goals: The Patient and/or patient representative was provided with a choice of provider and agrees   with the discharge plan. [x] Yes [] No    Freedom of choice list was provided with basic dialogue that supports the patient's individualized plan of care/goals, treatment preferences and shares the quality data associated with the providers.  [x] Yes [] No

## 2021-08-09 NOTE — PROGRESS NOTES
Subjective: The patient is awake and alert. No problems overnight. Denies chest pain, angina, dyspnea or abdominal discomfort. No nausea or vomiting. Tolerating diet. In the process of getting discharged    Objective:    BP (!) 146/65   Pulse 84   Temp 97.9 °F (36.6 °C) (Temporal)   Resp 18   Ht 5' 8.9\" (1.75 m)   Wt 145 lb (65.8 kg)   SpO2 92%   BMI 21.48 kg/m²     General: NAD  HEENT: No thrush or mucositis, EOMI, PERRLA  Heart:  RRR, no murmurs, gallops, or rubs.   Lungs:  CTA bilaterally, no wheeze, rales or rhonchi  Abd: bowel sounds present, nontender, nondistended, no masses  Extrem:  No clubbing, cyanosis, or edema  Lymphatics: No palpable adenopathy in cervical and supraclavicular regions  Skin: Intact, no petechia or purpura    CBC with Differential:    Lab Results   Component Value Date    WBC 2.5 08/09/2021    RBC 2.94 08/09/2021    HGB 8.2 08/09/2021    HCT 25.9 08/09/2021     08/09/2021    MCV 88.1 08/09/2021    MCH 27.9 08/09/2021    MCHC 31.7 08/09/2021    RDW 15.9 08/09/2021    NRBC 0.9 08/07/2021    LYMPHOPCT 12.2 08/09/2021    MONOPCT 10.4 08/09/2021    MYELOPCT 0.9 08/09/2021    BASOPCT 2.6 08/09/2021    MONOSABS 0.25 08/09/2021    LYMPHSABS 0.30 08/09/2021    EOSABS 0.02 08/09/2021    BASOSABS 0.07 08/09/2021     CMP:    Lab Results   Component Value Date     08/09/2021    K 4.1 08/09/2021    K 4.0 08/08/2021     08/09/2021    CO2 22 08/09/2021    BUN 9 08/09/2021    CREATININE 1.0 08/09/2021    GFRAA >60 08/09/2021    LABGLOM >60 08/09/2021    GLUCOSE 95 08/09/2021    PROT 7.1 08/05/2021    LABALBU 3.9 08/05/2021    CALCIUM 9.1 08/09/2021    BILITOT 1.0 08/05/2021    ALKPHOS 54 08/05/2021    AST 17 08/05/2021    ALT 11 08/05/2021          Current Facility-Administered Medications:     amoxicillin-clavulanate (AUGMENTIN) 875-125 MG per tablet 1 tablet, 1 tablet, Oral, 2 times per day, Virgen Hernandez DO, 1 tablet at 08/09/21 1015    tamsulosin ([facility]) capsule 0.4 mg, 0.4 mg, Oral, Daily, Dotty Patel MD, 0.4 mg at 08/09/21 1016    amiodarone (CORDARONE) tablet 100 mg, 100 mg, Oral, Daily, Dotty Patel MD, 100 mg at 08/09/21 1016    amitriptyline (ELAVIL) tablet 25 mg, 25 mg, Oral, Nightly, Dotty Patel MD, 25 mg at 08/08/21 1942    apixaban (ELIQUIS) tablet 2.5 mg, 2.5 mg, Oral, BID, Dotty Patel MD, 2.5 mg at 08/09/21 1016    atorvastatin (LIPITOR) tablet 10 mg, 10 mg, Oral, Daily, Dotty Patel MD, 10 mg at 08/09/21 1016    ferrous sulfate (IRON 325) tablet 325 mg, 325 mg, Oral, BID, Dotty Patel MD, 325 mg at 08/09/21 1000    losartan (COZAAR) tablet 100 mg, 100 mg, Oral, Daily, Dotty Patel MD, 100 mg at 08/09/21 1016    sodium chloride flush 0.9 % injection 5-40 mL, 5-40 mL, Intravenous, 2 times per day, Dotty Patel MD, 10 mL at 08/07/21 0920    sodium chloride flush 0.9 % injection 5-40 mL, 5-40 mL, Intravenous, PRN, Dotty Patel MD    0.9 % sodium chloride infusion, 25 mL, Intravenous, PRN, Dotty Patel MD    ondansetron (ZOFRAN-ODT) disintegrating tablet 4 mg, 4 mg, Oral, Q8H PRN **OR** ondansetron (ZOFRAN) injection 4 mg, 4 mg, Intravenous, Q6H PRN, Dotty Patel MD    polyethylene glycol (GLYCOLAX) packet 17 g, 17 g, Oral, Daily PRN, Dotty Patel MD    acetaminophen (TYLENOL) tablet 650 mg, 650 mg, Oral, Q6H PRN **OR** acetaminophen (TYLENOL) suppository 650 mg, 650 mg, Rectal, Q6H PRN, Dotty Patel MD    ipratropium-albuterol (DUONEB) nebulizer solution 1 ampule, 1 ampule, Inhalation, Q4H Maikel Ty MD, 1 ampule at 08/09/21 1231    0.9 % sodium chloride infusion, , Intravenous, Continuous, Dotty Patel MD, Last Rate: 75 mL/hr at 08/08/21 1517, New Bag at 08/08/21 1517    guaiFENesin tablet 400 mg, 400 mg, Oral, TID, Dotty Patel MD, 400 mg at 08/09/21 1015    loperamide (IMODIUM) capsule 2 mg, 2 mg, Oral, 4x Daily PRN, Virgen Hernandze DO, 2 mg at 08/06/21 1240    budesonide (PULMICORT) nebulizer suspension 250 mcg, 0.25 mg, Nebulization, BID, 250 mcg at 08/09/21 0833 **AND** ipratropium (ATROVENT) 0.02 % nebulizer solution 0.5 mg, 0.5 mg, Nebulization, 4x daily, 0.5 mg at 08/07/21 2026 **AND** Arformoterol Tartrate (BROVANA) nebulizer solution 15 mcg, 15 mcg, Nebulization, BID, Nicolette Westfall MD, 15 mcg at 08/09/21 4644    Assessment:    Active Problems:    Sepsis [facility])  Resolved Problems:    * No resolved hospital problems. *    50-year-old gentleman known to Dr. Nilesh Mata with history of invasive adenocarcinoma status post LEONARDO lobectomy and LLL wedge resection 7/20/2020. Bx proven recurrent disease in June 2021. Treated with concurrent chemoradiation therapy with weekly Taxol from 7/16/2021, last treatment 7/30/2021. Admitted with shortness of breath and fever with temperature 101.6. CT chest revealed left lower lobe pneumonia.     Plan:  - Daily CBC  - Transfuse to keep hgb >7 and plt >10 or >20 if bleeding  - CT chest suggests progressive malignancy. PNA vs malignancy  - Will monitor right-sided 4 mm lesoin closely. - Will need Repeat CT chest after completion of chemoRT as outpatient. - continue Eliquis 2.5 mg BID  - PNA - Antibiotics per primary  - Cont supportive care  - Will plan to continue concurrent chemoradiation therapy OP after acute PNA resolves.    - discharging patient will sign off    Electronically signed by DEISI Barrientos CNP on 8/9/2021 at 2:59 PM

## 2021-08-10 ENCOUNTER — TELEPHONE (OUTPATIENT)
Dept: PRIMARY CARE CLINIC | Age: 71
End: 2021-08-10

## 2021-08-10 ENCOUNTER — HOSPITAL ENCOUNTER (OUTPATIENT)
Dept: RADIATION ONCOLOGY | Age: 71
Discharge: HOME | End: 2021-08-10
Attending: RADIOLOGY
Payer: COMMERCIAL

## 2021-08-10 LAB — BLOOD CULTURE, ROUTINE: NORMAL

## 2021-08-10 PROCEDURE — 77386 HC NTSTY MODUL RAD TX DLVR CPLX: CPT | Performed by: RADIOLOGY

## 2021-08-10 PROCEDURE — 77014 PR CT GUIDANCE PLACEMENT RAD THERAPY FIELDS: CPT | Performed by: RADIOLOGY

## 2021-08-10 NOTE — TELEPHONE ENCOUNTER
[place] [facility] Transitions Initial Follow Up Call    Outreach made within 2 business days of discharge: Yes    Patient: Trevon Manuel Patient : 1950   MRN: [id number]  Reason for Admission: There are no discharge diagnoses documented for the most recent discharge. Discharge Date: 21       Spoke with: left message to call office for appointment   2nd attemp--left message, reminded him of appointment on . Gave him my number if he had any other questions.        Discharge department/facility: [facility]        Scheduled appointment with PCP within 7-14 days    Follow Up  Future Appointments   Date Time Provider Jose Sheikh   8/10/2021  3:00 PM SCHEDULE, SEYZ LINAC [facility]Z [facility]   2021  3:00 PM SCHEDULE, SEYZ LINAC [facility]   2021  3:00 PM [facility]   2021  3:00 PM SCHEDULE, SEYZ LINAC [facility]YZ [facility]   2021  3:00 PM SCHEDULE, SEYZ LINAC [facility]YZ [facility]   2021  3:00 PM SCHEDULE, SEYZ LINAC [facility]YZ [facility]   2021  3:00 PM SCHEDULE, SEYZ LINAC [facility]YZ [facility]   2021  3:00 PM SCHEDULE, SEYZ LINAC [facility]YZ [facility]   2021  3:00 PM Leesa Ball MD [address] [place] [address]   2021  3:00 PM SCHEDULE, SEYZ LINAC [facility]YZ [facility]   2021  3:00 PM SCHEDULE, SEYZ LINAC [facility]YZ [facility]   2021  3:00 PM SCHEDULE, SEYZ LINAC [facility]   2021  3:00 PM SCHEDULE, SEYZ LINAC [facility]   2021  3:00 PM SCHEDULE, SEYZ LINAC [facility]   2021  3:00 PM MD [facility]   2021  3:00 PM SCHEDULE, ROBERT LINTATIANA JONES [facility]   2021  3:00 PM SCHEDULE, ROBERT JONES [facility]   2021  3:00 PM SCHEDULE, ROBERT JONES [facility]   2021  3:00 PM SCHEDULE, ROBERT JONES Rad Onc [facility]   9/1/2021  3:00 PM SCHEDULE, SEYZ LINAC SEYZ [facility]   9/2/2021  3:00 PM SCHEDULE, SEYZ LINAC SEYZ [facility]   9/3/2021  3:00 PM SCHEDULE, SEYZ LINAC SEYZ [facility]

## 2021-08-11 ENCOUNTER — TELEPHONE (OUTPATIENT)
Dept: PRIMARY CARE CLINIC | Age: 71
End: 2021-08-11

## 2021-08-11 ENCOUNTER — HOSPITAL ENCOUNTER (OUTPATIENT)
Dept: RADIATION ONCOLOGY | Age: 71
Discharge: HOME | End: 2021-08-11
Attending: RADIOLOGY
Payer: COMMERCIAL

## 2021-08-11 VITALS
WEIGHT: 143.5 LBS | TEMPERATURE: 97.1 F | RESPIRATION RATE: 18 BRPM | SYSTOLIC BLOOD PRESSURE: 140 MMHG | HEART RATE: 104 BPM | BODY MASS INDEX: 21.25 KG/M2 | OXYGEN SATURATION: 95 % | DIASTOLIC BLOOD PRESSURE: 72 MMHG

## 2021-08-11 LAB
BLOOD CULTURE, ROUTINE: NORMAL
CULTURE, BLOOD 2: NORMAL

## 2021-08-11 PROCEDURE — 77386 HC NTSTY MODUL RAD TX DLVR CPLX: CPT | Performed by: RADIOLOGY

## 2021-08-11 PROCEDURE — 77014 PR CT GUIDANCE PLACEMENT RAD THERAPY FIELDS: CPT | Performed by: RADIOLOGY

## 2021-08-11 RX ORDER — ALBUTEROL SULFATE 0.63 MG/3ML
1 SOLUTION RESPIRATORY (INHALATION) EVERY 6 HOURS PRN
COMMUNITY

## 2021-08-11 NOTE — PROGRESS NOTES
Nathen Height  8/11/2021  Wt Readings from Last 3 Encounters:   08/11/21 143 lb 8 oz (65.1 kg)   08/05/21 145 lb (65.8 kg)   08/05/21 143 lb (64.9 kg)     Body mass index is 21.25 kg/m². Treatment Area: CTV LUNG Left    Patient was seen today for weekly visit. Comfort Alteration  KPS:70%  Fatigue: Moderate    Ventilation Alterations  Cough: Yes  Hemoptysis: Yes  Mucus Color: clear  Dyspnea: No  O2 Sat: 95%    Nutritional Alteration  Anorexia: No  Nausea: No   Vomiting: No     Skin Alteration   Sensation:good, using lotion    Radiation Dermatitis:  na    Mucous Membrane Alteration  Voice Changes/ Stridor/Larynx: no  Pharynx & Esophagus: na    Elimination Alterations  Constipation: no  Diarrhea:  Yes, using immodium      Emotional  Coping: effective      Injury, potential bleeding or infection: na    Other:na    Lab Results   Component Value Date    WBC 2.5 (L) 08/09/2021     08/09/2021         BP (!) 140/72   Pulse 104   Temp 97.1 °F (36.2 °C) (Skin)   Resp 18   Wt 143 lb 8 oz (65.1 kg)   SpO2 95%   BMI 21.25 kg/m²   BP within normal range?  yes         Assessment/Plan:  13/30fx  2600/6000cGY and tolerating    Nereida Tomlinson RN

## 2021-08-11 NOTE — TELEPHONE ENCOUNTER
----- Message from Waylon Hopkins sent at 8/10/2021  2:19 PM EDT -----  Subject: Message to Provider    QUESTIONS  Information for Provider? Patient would like to speak to Woody at the   office  ---------------------------------------------------------------------------  --------------  [address]  What is the best way for the office to contact you? OK to leave message on   voicemail  Preferred Call Back Phone Number? [phone]  ---------------------------------------------------------------------------  --------------  SCRIPT ANSWERS  Relationship to Patient?  Self

## 2021-08-12 ENCOUNTER — HOSPITAL ENCOUNTER (OUTPATIENT)
Dept: RADIATION ONCOLOGY | Age: 71
Discharge: HOME | End: 2021-08-12
Attending: RADIOLOGY
Payer: COMMERCIAL

## 2021-08-12 ENCOUNTER — TELEPHONE (OUTPATIENT)
Dept: PRIMARY CARE CLINIC | Age: 71
End: 2021-08-12

## 2021-08-12 PROCEDURE — 77014 PR CT GUIDANCE PLACEMENT RAD THERAPY FIELDS: CPT | Performed by: RADIOLOGY

## 2021-08-12 PROCEDURE — 77386 HC NTSTY MODUL RAD TX DLVR CPLX: CPT | Performed by: RADIOLOGY

## 2021-08-12 NOTE — TELEPHONE ENCOUNTER
----- Message from Scott Maxim sent at 8/11/2021  4:01 PM EDT -----  Subject: Message to Provider    QUESTIONS  Information for Provider? Jesenia calling from [address] [facility] stated she   started services today getting chemo every Friday and radiation everyday   for the rest of the month and 2 days in Sept. If the doctor needs any labs   he has a port . Please call if any questions Also patient has lung cancer   with SOB and he may need oxygen would like to know if he can get rescue   oxygen   ---------------------------------------------------------------------------  --------------  CALL BACK INFO  What is the best way for the office to contact you? OK to leave message on   voicemail  Preferred Call Back Phone Number? [phone]  ---------------------------------------------------------------------------  --------------  SCRIPT ANSWERS  Relationship to Patient? Third Party  Representative Name?  Essence Leos

## 2021-08-12 NOTE — TELEPHONE ENCOUNTER
[address] home care called stating they just finished ruff home care evaluation and he is doing very and is in good spirits.  She stated he does not need any physical therapy

## 2021-08-12 NOTE — TELEPHONE ENCOUNTER
He has had blood work recently and I do not require him to have anything additionally done. He should contact his pulmonologist in regards to the oxygen.

## 2021-08-12 NOTE — TELEPHONE ENCOUNTER
Left message on both phone lines of patient's. Does he need any lab work from you and in regards to the oxygen the nurse is requested what do you recommend? Also review the note below.

## 2021-08-13 ENCOUNTER — HOSPITAL ENCOUNTER (OUTPATIENT)
Dept: RADIATION ONCOLOGY | Age: 71
Discharge: HOME | End: 2021-08-13
Attending: RADIOLOGY
Payer: COMMERCIAL

## 2021-08-13 PROCEDURE — 77014 PR CT GUIDANCE PLACEMENT RAD THERAPY FIELDS: CPT | Performed by: RADIOLOGY

## 2021-08-13 PROCEDURE — 77336 RADIATION PHYSICS CONSULT: CPT | Performed by: RADIOLOGY

## 2021-08-13 PROCEDURE — 77386 HC NTSTY MODUL RAD TX DLVR CPLX: CPT | Performed by: RADIOLOGY

## 2021-08-16 ENCOUNTER — HOSPITAL ENCOUNTER (OUTPATIENT)
Dept: RADIATION ONCOLOGY | Age: 71
Discharge: HOME | End: 2021-08-16
Attending: RADIOLOGY
Payer: COMMERCIAL

## 2021-08-16 PROCEDURE — 77386 HC NTSTY MODUL RAD TX DLVR CPLX: CPT | Performed by: RADIOLOGY

## 2021-08-16 PROCEDURE — 77014 PR CT GUIDANCE PLACEMENT RAD THERAPY FIELDS: CPT | Performed by: RADIOLOGY

## 2021-08-17 ENCOUNTER — HOSPITAL ENCOUNTER (OUTPATIENT)
Dept: RADIATION ONCOLOGY | Age: 71
Discharge: HOME | End: 2021-08-17
Attending: RADIOLOGY
Payer: COMMERCIAL

## 2021-08-17 PROCEDURE — 77386 HC NTSTY MODUL RAD TX DLVR CPLX: CPT | Performed by: RADIOLOGY

## 2021-08-17 PROCEDURE — 77014 PR CT GUIDANCE PLACEMENT RAD THERAPY FIELDS: CPT | Performed by: RADIOLOGY

## 2021-08-18 ENCOUNTER — HOSPITAL ENCOUNTER (OUTPATIENT)
Dept: RADIATION ONCOLOGY | Age: 71
Discharge: HOME | End: 2021-08-18
Attending: RADIOLOGY
Payer: COMMERCIAL

## 2021-08-18 VITALS
WEIGHT: 145 LBS | BODY MASS INDEX: 21.48 KG/M2 | OXYGEN SATURATION: 97 % | HEART RATE: 102 BPM | DIASTOLIC BLOOD PRESSURE: 50 MMHG | RESPIRATION RATE: 20 BRPM | TEMPERATURE: 97.5 F | SYSTOLIC BLOOD PRESSURE: 96 MMHG

## 2021-08-18 DIAGNOSIS — C34.90 MALIGNANT NEOPLASM OF LUNG, UNSPECIFIED LATERALITY, UNSPECIFIED PART OF LUNG (HCC): Primary | ICD-10-CM

## 2021-08-18 PROCEDURE — 77386 HC NTSTY MODUL RAD TX DLVR CPLX: CPT | Performed by: RADIOLOGY

## 2021-08-18 PROCEDURE — 99999 PR OFFICE/OUTPT VISIT,PROCEDURE ONLY: CPT | Performed by: RADIOLOGY

## 2021-08-18 PROCEDURE — 77014 PR CT GUIDANCE PLACEMENT RAD THERAPY FIELDS: CPT | Performed by: RADIOLOGY

## 2021-08-18 RX ORDER — SUCRALFATE ORAL 1 G/10ML
1 SUSPENSION ORAL 4 TIMES DAILY
Qty: 1200 ML | Refills: 3 | Status: SHIPPED
Start: 2021-08-18 | End: 2021-10-12 | Stop reason: SDUPTHER

## 2021-08-18 ASSESSMENT — PAIN DESCRIPTION - ORIENTATION: ORIENTATION: MID

## 2021-08-18 ASSESSMENT — PAIN DESCRIPTION - DESCRIPTORS: DESCRIPTORS: SORE

## 2021-08-18 ASSESSMENT — PAIN DESCRIPTION - LOCATION: LOCATION: CHEST

## 2021-08-18 ASSESSMENT — PAIN DESCRIPTION - FREQUENCY: FREQUENCY: INTERMITTENT

## 2021-08-18 ASSESSMENT — PAIN SCALES - GENERAL: PAINLEVEL_OUTOF10: 4

## 2021-08-18 NOTE — PROGRESS NOTES
Hector Ek  8/18/2021  Wt Readings from Last 3 Encounters:   08/18/21 145 lb (65.8 kg)   08/11/21 143 lb 8 oz (65.1 kg)   08/05/21 145 lb (65.8 kg)     Body mass index is 21.48 kg/m². Treatment Area:CTV left lung    Patient was seen today for weekly visit. Comfort Alteration  KPS:80%  Fatigue: None    Ventilation Alterations  Cough: Yes/ little bit   Hemoptysis: No  Mucus Color: beige  Dyspnea: No  O2 Sat: 97%    Nutritional Alteration  Anorexia: No  Nausea: No   Vomiting: No     Skin Alteration   Sensation:none    Radiation Dermatitis:  Moisturizing skin st least daily, educated to increase to at least 2 times daily verbalizes understanding    Mucous Membrane Alteration  Voice Changes/ Stridor/Larynx: no  Pharynx & Esophagus: na    Elimination Alterations  Constipation: no  Diarrhea:  no      Emotional  Coping: effective      Injury, potential bleeding or infection: na    Other:na    Lab Results   Component Value Date    WBC 2.5 (L) 08/09/2021     08/09/2021         BP (!) 96/50   Pulse 102   Temp 97.5 °F (36.4 °C) (Temporal)   Resp 20   Wt 145 lb (65.8 kg)   SpO2 97%   BMI 21.48 kg/m²   BP within normal range? Yes       Assessment/Plan:  Completed 18/30 FX; 3600/6000 cGy.      Malcolm Maurer RN

## 2021-08-19 ENCOUNTER — HOSPITAL ENCOUNTER (OUTPATIENT)
Dept: RADIATION ONCOLOGY | Age: 71
Discharge: HOME | End: 2021-08-19
Attending: RADIOLOGY
Payer: COMMERCIAL

## 2021-08-19 PROCEDURE — 77386 HC NTSTY MODUL RAD TX DLVR CPLX: CPT | Performed by: RADIOLOGY

## 2021-08-19 PROCEDURE — 77014 PR CT GUIDANCE PLACEMENT RAD THERAPY FIELDS: CPT | Performed by: RADIOLOGY

## 2021-08-20 ENCOUNTER — HOSPITAL ENCOUNTER (OUTPATIENT)
Dept: RADIATION ONCOLOGY | Age: 71
Discharge: HOME | End: 2021-08-20
Attending: RADIOLOGY
Payer: COMMERCIAL

## 2021-08-20 PROCEDURE — 77386 HC NTSTY MODUL RAD TX DLVR CPLX: CPT | Performed by: RADIOLOGY

## 2021-08-20 PROCEDURE — 77014 PR CT GUIDANCE PLACEMENT RAD THERAPY FIELDS: CPT | Performed by: RADIOLOGY

## 2021-08-20 PROCEDURE — 77336 RADIATION PHYSICS CONSULT: CPT | Performed by: RADIOLOGY

## 2021-08-23 ENCOUNTER — APPOINTMENT (OUTPATIENT)
Dept: RADIATION ONCOLOGY | Age: 71
End: 2021-08-23
Attending: RADIOLOGY
Payer: COMMERCIAL

## 2021-08-23 ENCOUNTER — HOSPITAL ENCOUNTER (EMERGENCY)
Age: 71
Discharge: HOME | End: 2021-08-23
Attending: EMERGENCY MEDICINE
Payer: COMMERCIAL

## 2021-08-23 ENCOUNTER — APPOINTMENT (OUTPATIENT)
Dept: CT IMAGING | Age: 71
End: 2021-08-23
Payer: COMMERCIAL

## 2021-08-23 ENCOUNTER — APPOINTMENT (OUTPATIENT)
Dept: GENERAL RADIOLOGY | Age: 71
End: 2021-08-23
Payer: COMMERCIAL

## 2021-08-23 ENCOUNTER — TELEPHONE (OUTPATIENT)
Dept: OTHER | Facility: CLINIC | Age: 71
End: 2021-08-23

## 2021-08-23 VITALS
SYSTOLIC BLOOD PRESSURE: 121 MMHG | HEART RATE: 78 BPM | DIASTOLIC BLOOD PRESSURE: 75 MMHG | OXYGEN SATURATION: 97 % | TEMPERATURE: 98 F | RESPIRATION RATE: 18 BRPM

## 2021-08-23 DIAGNOSIS — R10.30 LOWER ABDOMINAL PAIN: ICD-10-CM

## 2021-08-23 DIAGNOSIS — J18.9 PNEUMONIA OF RIGHT LOWER LOBE DUE TO INFECTIOUS ORGANISM: Primary | ICD-10-CM

## 2021-08-23 LAB
ADENOVIRUS BY PCR: NOT DETECTED
ANION GAP SERPL CALCULATED.3IONS-SCNC: 11 MMOL/L (ref 7–16)
BILIRUBIN URINE: NEGATIVE
BLOOD, URINE: NEGATIVE
BORDETELLA PARAPERTUSSIS BY PCR: NOT DETECTED
BORDETELLA PERTUSSIS BY PCR: NOT DETECTED
BUN BLDV-MCNC: 14 MG/DL (ref 6–23)
CALCIUM SERPL-MCNC: 9.1 MG/DL (ref 8.6–10.2)
CHLAMYDOPHILIA PNEUMONIAE BY PCR: NOT DETECTED
CHLORIDE BLD-SCNC: 101 MMOL/L (ref 98–107)
CLARITY: CLEAR
CO2: 25 MMOL/L (ref 22–29)
COLOR: YELLOW
CORONAVIRUS 229E BY PCR: NOT DETECTED
CORONAVIRUS HKU1 BY PCR: NOT DETECTED
CORONAVIRUS NL63 BY PCR: NOT DETECTED
CORONAVIRUS OC43 BY PCR: NOT DETECTED
CREAT SERPL-MCNC: 1 MG/DL (ref 0.7–1.2)
GFR AFRICAN AMERICAN: >60
GFR NON-AFRICAN AMERICAN: >60 ML/MIN/1.73
GLUCOSE BLD-MCNC: 97 MG/DL (ref 74–99)
GLUCOSE URINE: NEGATIVE MG/DL
HCT VFR BLD CALC: 28.8 % (ref 37–54)
HEMOGLOBIN: 9 G/DL (ref 12.5–16.5)
HUMAN METAPNEUMOVIRUS BY PCR: NOT DETECTED
HUMAN RHINOVIRUS/ENTEROVIRUS BY PCR: NOT DETECTED
INFLUENZA A BY PCR: NOT DETECTED
INFLUENZA B BY PCR: NOT DETECTED
KETONES, URINE: NEGATIVE MG/DL
LACTIC ACID: 1.5 MMOL/L (ref 0.5–2.2)
LEUKOCYTE ESTERASE, URINE: NEGATIVE
LIPASE: 17 U/L (ref 13–60)
MCH RBC QN AUTO: 28.1 PG (ref 26–35)
MCHC RBC AUTO-ENTMCNC: 31.3 % (ref 32–34.5)
MCV RBC AUTO: 90 FL (ref 80–99.9)
MYCOPLASMA PNEUMONIAE BY PCR: NOT DETECTED
NITRITE, URINE: NEGATIVE
PARAINFLUENZA VIRUS 1 BY PCR: NOT DETECTED
PARAINFLUENZA VIRUS 2 BY PCR: NOT DETECTED
PARAINFLUENZA VIRUS 3 BY PCR: NOT DETECTED
PARAINFLUENZA VIRUS 4 BY PCR: NOT DETECTED
PDW BLD-RTO: 18.3 FL (ref 11.5–15)
PH UA: 6.5 (ref 5–9)
PLATELET # BLD: 196 E9/L (ref 130–450)
PMV BLD AUTO: 9.3 FL (ref 7–12)
POTASSIUM SERPL-SCNC: 4.7 MMOL/L (ref 3.5–5)
PROTEIN UA: NEGATIVE MG/DL
RBC # BLD: 3.2 E12/L (ref 3.8–5.8)
RESPIRATORY SYNCYTIAL VIRUS BY PCR: NOT DETECTED
SARS-COV-2, PCR: NOT DETECTED
SODIUM BLD-SCNC: 137 MMOL/L (ref 132–146)
SPECIFIC GRAVITY UA: 1.01 (ref 1–1.03)
TROPONIN, HIGH SENSITIVITY: 15 NG/L (ref 0–11)
TROPONIN, HIGH SENSITIVITY: 16 NG/L (ref 0–11)
UROBILINOGEN, URINE: 0.2 E.U./DL
WBC # BLD: 4.3 E9/L (ref 4.5–11.5)

## 2021-08-23 PROCEDURE — 2580000003 HC RX 258: Performed by: PHYSICIAN ASSISTANT

## 2021-08-23 PROCEDURE — 6360000004 HC RX CONTRAST MEDICATION: Performed by: RADIOLOGY

## 2021-08-23 PROCEDURE — 71275 CT ANGIOGRAPHY CHEST: CPT

## 2021-08-23 PROCEDURE — 36415 COLL VENOUS BLD VENIPUNCTURE: CPT

## 2021-08-23 PROCEDURE — 85027 COMPLETE CBC AUTOMATED: CPT

## 2021-08-23 PROCEDURE — 84484 ASSAY OF TROPONIN QUANT: CPT

## 2021-08-23 PROCEDURE — 99283 EMERGENCY DEPT VISIT LOW MDM: CPT

## 2021-08-23 PROCEDURE — 93005 ELECTROCARDIOGRAM TRACING: CPT | Performed by: PHYSICIAN ASSISTANT

## 2021-08-23 PROCEDURE — 87040 BLOOD CULTURE FOR BACTERIA: CPT

## 2021-08-23 PROCEDURE — 87088 URINE BACTERIA CULTURE: CPT

## 2021-08-23 PROCEDURE — 0202U NFCT DS 22 TRGT SARS-COV-2: CPT

## 2021-08-23 PROCEDURE — 83605 ASSAY OF LACTIC ACID: CPT

## 2021-08-23 PROCEDURE — 80048 BASIC METABOLIC PNL TOTAL CA: CPT

## 2021-08-23 PROCEDURE — 81003 URINALYSIS AUTO W/O SCOPE: CPT

## 2021-08-23 PROCEDURE — 74177 CT ABD & PELVIS W/CONTRAST: CPT

## 2021-08-23 PROCEDURE — 83690 ASSAY OF LIPASE: CPT

## 2021-08-23 PROCEDURE — 71046 X-RAY EXAM CHEST 2 VIEWS: CPT

## 2021-08-23 RX ORDER — SODIUM CHLORIDE 0.9 % (FLUSH) 0.9 %
10 SYRINGE (ML) INJECTION PRN
Status: DISCONTINUED | OUTPATIENT
Start: 2021-08-23 | End: 2021-08-23 | Stop reason: HOSPADM

## 2021-08-23 RX ORDER — DOXYCYCLINE HYCLATE 100 MG
100 TABLET ORAL 2 TIMES DAILY
Qty: 14 TABLET | Refills: 0 | Status: SHIPPED | OUTPATIENT
Start: 2021-08-23 | End: 2021-08-28

## 2021-08-23 RX ADMIN — IOPAMIDOL 90 ML: 755 INJECTION, SOLUTION INTRAVENOUS at 15:34

## 2021-08-23 RX ADMIN — Medication 10 ML: at 15:34

## 2021-08-23 NOTE — ED NOTES
FIRST PROVIDER CONTACT ASSESSMENT NOTE                                                                                                Department of Emergency Medicine                                                      First Provider Note  21  10:42 AM EDT  NAME: Anthony Small  : 1950  MRN: [id number]    Chief Complaint: Shortness of Breath (pt finished atx for PNA, was at [place] [address] ED last night still has PNA, hx lung CA, active chemo)      History of Present Illness:   Anthony Small is a 80 y.o. male who presents to the ED for sob, weak, near syncope. Seen at [facility] ER yest. for same. Refused admission. On chemo for Lung CA. Focused Physical Exam:  VS:    ED Triage Vitals [21 1015]   BP Temp Temp src Pulse Resp SpO2 Height Weight   -- 97.7 °F (36.5 °C) -- 90 -- 94 % -- --        General: Alert and in no apparent distress. Medical History:  has a past medical history of Hyperlipidemia, Hypertension, and Lung cancer ([facility] [address] Surgical History:  has a past surgical history that includes Lung cancer surgery and CT NEEDLE BIOPSY LUNG PERCUTANEOUS (2021). Social History:  reports that he quit smoking about 13 months ago. His smoking use included cigarettes. He started smoking about 56 years ago. He has a 55.00 pack-year smoking history. He has never used smokeless tobacco. He reports previous alcohol use. He reports that he does not use drugs. Family History: family history includes Cancer in his brother. Allergies: Patient has no known allergies.      Initial Plan of Care:  Initiate Treatment-Testing, Proceed toTreatment Area When Bed Available for ED Attending/MLP to Continue Care    -------------------------------------------------[address] ASSESSMENT NOTE--------------------------------------------------------  Electronically signed by BLAIR Beal   DD: 21       Jairon Soriano  21 1043

## 2021-08-23 NOTE — TELEPHONE ENCOUNTER
Writer contacted ED provider Shahana Kline  to inform of 30 day readmission risk. Writer's attempt to contact ED provider was unsuccessful. No Decision on disposition at this time.         Call Back: If you need to call back to inform of disposition you can contact me at [phone]

## 2021-08-23 NOTE — ED NOTES
Bed: 39  Expected date:   Expected time:   Means of arrival:   Comments:  No bed     Sarika Putnam RN  08/23/21 1145

## 2021-08-23 NOTE — ED PROVIDER NOTES
HPI:  8/23/21, Time: 12:19 PM EDT         Gricelda Ellis is a 80 y.o. male presenting to the ED for abdominal pain, beginning several days ago. The complaint has been intermittent, moderate in severity, and worsened by fasting. Patient has a past medical history of adenocarcinoma of the lung status post lobectomy currently receiving radiation therapy, A. fib on Eliquis, and hypertension. He reports abdominal pain occurring for the past several days has not worsened. Pain comes and goes, states that pain is moderate and does not radiate. Additional symptoms include shortness of breath on exertion. Has been constant, has not worsened in the past couple of days. Does report some nausea no emesis. Patient was seen yesterday at [facility] ED for near syncopal episode. Work-up there showed abnormal cardiac rhythm but patient decided to go home. He denies any chest pain, dysuria, or decreased bowel movements. Review of Systems:   A complete review of systems was performed and pertinent positives and negatives are stated within HPI, all other systems reviewed and are negative.          --------------------------------------------- PAST HISTORY ---------------------------------------------  Past Medical History:  has a past medical history of Hyperlipidemia, Hypertension, and Lung cancer ([facility] [address] Past Surgical History:  has a past surgical history that includes Lung cancer surgery and CT NEEDLE BIOPSY LUNG PERCUTANEOUS (6/17/2021). Social History:  reports that he quit smoking about 13 months ago. His smoking use included cigarettes. He started smoking about 56 years ago. He has a 55.00 pack-year smoking history. He has never used smokeless tobacco. He reports previous alcohol use. He reports that he does not use drugs. Family History: family history includes Cancer in his brother. The patients home medications have been reviewed.     Allergies: Patient has no known 11.5 E9/L    RBC 3.20 (L) 3.80 - 5.80 E12/L    Hemoglobin 9.0 (L) 12.5 - 16.5 g/dL    Hematocrit 28.8 (L) 37.0 - 54.0 %    MCV 90.0 80.0 - 99.9 fL    MCH 28.1 26.0 - 35.0 pg    MCHC 31.3 (L) 32.0 - 34.5 %    RDW 18.3 (H) 11.5 - 15.0 fL    Platelets 148 166 - 286 E9/L    MPV 9.3 7.0 - 12.0 fL   Troponin I   Result Value Ref Range    Troponin, High Sensitivity 15 (H) 0 - 11 ng/L   Lactic Acid, Plasma   Result Value Ref Range    Lactic Acid 1.5 0.5 - 2.2 mmol/L   Lipase   Result Value Ref Range    Lipase 17 13 - 60 U/L   Urinalysis   Result Value Ref Range    Color, UA Yellow Straw/Yellow    Clarity, UA Clear Clear    Glucose, Ur Negative Negative mg/dL    Bilirubin Urine Negative Negative    Ketones, Urine Negative Negative mg/dL    Specific Gravity, UA 1.015 1.005 - 1.030    Blood, Urine Negative Negative    pH, UA 6.5 5.0 - 9.0    Protein, UA Negative Negative mg/dL    Urobilinogen, Urine 0.2 <2.0 E.U./dL    Nitrite, Urine Negative Negative    Leukocyte Esterase, Urine Negative Negative   Troponin   Result Value Ref Range    Troponin, High Sensitivity 16 (H) 0 - 11 ng/L   EKG 12 Lead   Result Value Ref Range    Ventricular Rate 94 BPM    Atrial Rate 94 BPM    P-R Interval 136 ms    QRS Duration 104 ms    Q-T Interval 364 ms    QTc Calculation (Bazett) 455 ms    P Axis 49 degrees    R Axis 98 degrees    T Axis 1 degrees       RADIOLOGY:  Interpreted by Radiologist.  CTA PULMONARY W CONTRAST   Final Result   1. No acute the pulmonary emboli. 2.  No aneurysm formation or dissection of the thoracic aorta. 3.  Status post left upper lobe lobectomy. The chronic loculated   pneumothorax in the mid upper aspect of the left chest cavity which   compresses areas of the lingula and left lower lobe. 4.  Stable spiculated nodule in the lingula. 5.  Stable 2 subpleural pulmonary nodules in the left lung.       6.  Development of small nodular interlobular infiltrate in the left lower   lobe posterior inferiorly which can be manifestation of distal airway disease   or distal bronchial spread of disease, bronchial pneumonia. 7.  Area of more confluent consolidation in the posteromedial aspect of the   left lower lobe is a residual infiltrate that significant improved since   August 5..      8.  Development of a infiltrate with atelectasis in the left lower lobe which   has progressed since the study of August 5. CT ABDOMEN PELVIS W IV CONTRAST Additional Contrast? None   Final Result   1. Hyperdense material located in gallbladder likely represents hyperdense   sludge. Ultrasound may be helpful for further evaluation. 2. No bowel obstruction, free air, or focal inflammatory changes in the   abdomen or pelvis. 3. Multiple new nodules located in posterior right lower lobe could indicate   new pneumonia or subsegmental atelectasis. Similar appearing nodules located   in posterior left lower lobe could indicate metastatic foci. 4. Multiple stable subcentimeter hypoattenuating liver lesions. These   lesions are too small to characterize yet likely represent cysts or   hemangiomas. XR CHEST (2 VW)   Final Result   Stable loculated pneumothorax on the left and left-sided fibrosis. The   underlying lung masses are not well demonstrated radiographically better   clearly evident by CT.             ------------------------- NURSING NOTES AND VITALS REVIEWED ---------------------------   The nursing notes within the ED encounter and vital signs as below have been reviewed.    /75   Pulse 78   Temp 98 °F (36.7 °C) (Temporal)   Resp 18   SpO2 97%   Oxygen Saturation Interpretation: Normal      ---------------------------------------------------PHYSICAL EXAM--------------------------------------      Constitutional/General: Alert and oriented x3, well appearing, non toxic in NAD  Head: Normocephalic and atraumatic  Eyes: PERRL, EOMI  Mouth: Oropharynx clear, handling secretions, no trismus  Neck: Supple, full ROM,   Pulmonary: Lungs clear to auscultation bilaterally, no wheezes, rales, or rhonchi. Not in respiratory distress. Well healed scar noted on left sided chest wall. Cardiovascular:  Regular rate and rhythm, no murmurs, gallops, or rubs. 2+ distal pulses  Abdomen: Soft, non tender, non distended, bowel sounds present. Extremities: Moves all extremities x 4. Warm and well perfused  Skin: warm and dry without rash  Neurologic: GCS 15.  Psych: Normal Affect      ------------------------------ ED COURSE/MEDICAL DECISION MAKING----------------------  Medications   sodium chloride flush 0.9 % injection 10 mL (has no administration in time range)   sodium chloride flush 0.9 % injection 10 mL (10 mLs Intravenous Given 8/23/21 1534)   iopamidol (ISOVUE-370) 76 % injection 90 mL (90 mLs Intravenous Given 8/23/21 1534)         ED COURSE:       Medical Decision Making:    Patient is a 67-year-old male with a past medical history of adenocarcinoma of the lung status post lobectomy currently on radiation therapy and A. fib on Eliquis who presented for abdominal pain. Patient reports intermittent abdominal pain for the past several days. Comes and goes, has not increase in severity and does not radiate. Patient was seen yesterday at [facility] ED for syncopal episode. Patient reports nausea but no emesis at this time. Work up for abdominal pain was performed. CXR: Stable loculated pneumothorax on the left and left-sided fibrosis. CBC WBC 4.3, Hgb: 9.0 (at baseline). BMP WNL. Blood culture drawn. Troponin 15, repeat 16. Resp Panel including covid- negative. CT abdomen showed Multiple new nodules located in posterior right lower lobe could indicate new pneumonia or subsegmental atelectasis. Patient will be sent home on 5 day course of Doxycyline. Patient is recommended to return to ER is symptoms worsen. Counseling:    The emergency provider has spoken with the patient and discussed todays results, in addition to providing specific details for the plan of care and counseling regarding the diagnosis and prognosis. Questions are answered at this time and they are agreeable with the plan.      --------------------------------- IMPRESSION AND DISPOSITION ---------------------------------    IMPRESSION  1. Pneumonia of right lower lobe due to infectious organism    2. Lower abdominal pain        DISPOSITION  Disposition: Discharge to home  Patient condition is good      NOTE: This report was transcribed using voice recognition software.  Every effort was made to ensure accuracy; however, inadvertent computerized transcription errors may be present       Jonnie Tapia MD  Resident  08/23/21 4990       Jonnie Tapia MD  Resident  08/23/21 0115

## 2021-08-23 NOTE — ED NOTES
Pt in XRAY at this time. Salima notified of room assignment.       Payton Lopez, STANLEY  08/23/21 9969

## 2021-08-24 ENCOUNTER — HOSPITAL ENCOUNTER (OUTPATIENT)
Dept: RADIATION ONCOLOGY | Age: 71
Discharge: HOME | End: 2021-08-24
Attending: RADIOLOGY
Payer: COMMERCIAL

## 2021-08-24 ENCOUNTER — TELEPHONE (OUTPATIENT)
Dept: OTHER | Facility: CLINIC | Age: 71
End: 2021-08-24

## 2021-08-24 PROCEDURE — 77014 PR CT GUIDANCE PLACEMENT RAD THERAPY FIELDS: CPT | Performed by: RADIOLOGY

## 2021-08-24 PROCEDURE — 77386 HC NTSTY MODUL RAD TX DLVR CPLX: CPT | Performed by: RADIOLOGY

## 2021-08-24 NOTE — TELEPHONE ENCOUNTER
Nurse Access contacted pt's daughter Vianey Kaiser regarding ED f/u appt. Spoke with May, she stated she will contact office to schedule ED f/u appt.

## 2021-08-25 ENCOUNTER — HOSPITAL ENCOUNTER (OUTPATIENT)
Dept: RADIATION ONCOLOGY | Age: 71
Discharge: HOME | End: 2021-08-25
Attending: RADIOLOGY
Payer: COMMERCIAL

## 2021-08-25 VITALS
SYSTOLIC BLOOD PRESSURE: 140 MMHG | BODY MASS INDEX: 21.15 KG/M2 | HEART RATE: 108 BPM | WEIGHT: 142.8 LBS | TEMPERATURE: 97.1 F | DIASTOLIC BLOOD PRESSURE: 62 MMHG | OXYGEN SATURATION: 97 % | RESPIRATION RATE: 18 BRPM

## 2021-08-25 DIAGNOSIS — C34.90 MALIGNANT NEOPLASM OF LUNG, UNSPECIFIED LATERALITY, UNSPECIFIED PART OF LUNG (HCC): Primary | ICD-10-CM

## 2021-08-25 LAB
EKG ATRIAL RATE: 94 BPM
EKG P AXIS: 49 DEGREES
EKG P-R INTERVAL: 136 MS
EKG Q-T INTERVAL: 364 MS
EKG QRS DURATION: 104 MS
EKG QTC CALCULATION (BAZETT): 455 MS
EKG R AXIS: 98 DEGREES
EKG T AXIS: 1 DEGREES
EKG VENTRICULAR RATE: 94 BPM
URINE CULTURE, ROUTINE: NORMAL

## 2021-08-25 PROCEDURE — 93010 ELECTROCARDIOGRAM REPORT: CPT | Performed by: INTERNAL MEDICINE

## 2021-08-25 PROCEDURE — 77014 PR CT GUIDANCE PLACEMENT RAD THERAPY FIELDS: CPT | Performed by: RADIOLOGY

## 2021-08-25 PROCEDURE — 99999 PR OFFICE/OUTPT VISIT,PROCEDURE ONLY: CPT | Performed by: RADIOLOGY

## 2021-08-25 PROCEDURE — 77427 RADIATION TX MANAGEMENT X5: CPT | Performed by: RADIOLOGY

## 2021-08-25 PROCEDURE — 77386 HC NTSTY MODUL RAD TX DLVR CPLX: CPT | Performed by: RADIOLOGY

## 2021-08-25 NOTE — ED PROVIDER NOTES
Addendum: This patient was signed out to me pending CTA by Dr. Beatriz Wilder. I reviewed the CTA as well as reassess the patient. No evidence of pulmonary emboli or aneurysm formation. Does have a small left lower lobe posterior inferior infiltrate which could potentially be bronchial pneumonia. This is been new since August 5. Patient has no significant leukocytosis. However, given the findings of possible lobar pneumonia and his underlying history we did start the patient on doxycycline prophylaxis. Patient is agreeable to this with outpatient follow-up.     Robby Link DO  Emergency Medicine         Marlo Maria DO  08/25/21 6296

## 2021-08-26 ENCOUNTER — HOSPITAL ENCOUNTER (OUTPATIENT)
Dept: RADIATION ONCOLOGY | Age: 71
Discharge: HOME | End: 2021-08-26
Attending: RADIOLOGY
Payer: COMMERCIAL

## 2021-08-26 PROCEDURE — 77014 PR CT GUIDANCE PLACEMENT RAD THERAPY FIELDS: CPT | Performed by: RADIOLOGY

## 2021-08-26 PROCEDURE — 77386 HC NTSTY MODUL RAD TX DLVR CPLX: CPT | Performed by: RADIOLOGY

## 2021-08-26 NOTE — PATIENT INSTRUCTIONS
Continue daily fractionated radiation therapy as scheduled. Please see weekly OTV note and intial consultation letter in [facility] for clinical details. Kev Salazar. Duane Araya MD MS [address] Sanchez:  [phone]   FAX: [phone] [address] [facility] [address]:  [phone]   FAX:    [phone]  [address] [place] [address] Road:  [phone]   FAX:  [phone]  Email: [email or web address]

## 2021-08-26 NOTE — PROGRESS NOTES
side effects were reviewed with the patient PRN. Treatment imaging has been personally reviewed for accuracy and precision. Questions answered to apparent satisfaction. Treatments will continue as planned. Kimberly Navarro.  Soraya Amaya MD MS HUSSEINR  Radiation Oncologist        [address] [place] [address] Ave ([address] [place] [address]): [phone] /// FAX: [phone]  [facility]): [phone] /// FAX: [phone]  [facility]): [phone] /// FAX: [phone]

## 2021-08-27 ENCOUNTER — HOSPITAL ENCOUNTER (OUTPATIENT)
Dept: RADIATION ONCOLOGY | Age: 71
Discharge: HOME | End: 2021-08-27
Attending: RADIOLOGY
Payer: COMMERCIAL

## 2021-08-27 PROCEDURE — 77386 HC NTSTY MODUL RAD TX DLVR CPLX: CPT | Performed by: RADIOLOGY

## 2021-08-27 PROCEDURE — 77014 PR CT GUIDANCE PLACEMENT RAD THERAPY FIELDS: CPT | Performed by: RADIOLOGY

## 2021-08-28 LAB
BLOOD CULTURE, ROUTINE: NORMAL
CULTURE, BLOOD 2: NORMAL

## 2021-08-30 ENCOUNTER — HOSPITAL ENCOUNTER (OUTPATIENT)
Dept: RADIATION ONCOLOGY | Age: 71
Discharge: HOME | End: 2021-08-30
Attending: RADIOLOGY
Payer: COMMERCIAL

## 2021-08-30 PROBLEM — C34.10 LUNG CANCER, UPPER LOBE (HCC): Status: ACTIVE | Noted: 2020-07-20

## 2021-08-30 PROBLEM — R07.9 CHEST PAIN: Status: ACTIVE | Noted: 2020-09-14

## 2021-08-30 PROCEDURE — 77336 RADIATION PHYSICS CONSULT: CPT | Performed by: RADIOLOGY

## 2021-08-30 PROCEDURE — 77014 PR CT GUIDANCE PLACEMENT RAD THERAPY FIELDS: CPT | Performed by: RADIOLOGY

## 2021-08-30 PROCEDURE — 77386 HC NTSTY MODUL RAD TX DLVR CPLX: CPT | Performed by: RADIOLOGY

## 2021-08-31 ENCOUNTER — HOSPITAL ENCOUNTER (OUTPATIENT)
Dept: RADIATION ONCOLOGY | Age: 71
Discharge: HOME | End: 2021-08-31
Attending: RADIOLOGY
Payer: COMMERCIAL

## 2021-08-31 PROCEDURE — 77014 PR CT GUIDANCE PLACEMENT RAD THERAPY FIELDS: CPT | Performed by: RADIOLOGY

## 2021-08-31 PROCEDURE — 77386 HC NTSTY MODUL RAD TX DLVR CPLX: CPT | Performed by: RADIOLOGY

## 2021-09-01 ENCOUNTER — HOSPITAL ENCOUNTER (OUTPATIENT)
Dept: RADIATION ONCOLOGY | Age: 71
Discharge: HOME | End: 2021-09-01
Attending: RADIOLOGY
Payer: COMMERCIAL

## 2021-09-01 VITALS
WEIGHT: 144.5 LBS | RESPIRATION RATE: 18 BRPM | OXYGEN SATURATION: 98 % | DIASTOLIC BLOOD PRESSURE: 62 MMHG | BODY MASS INDEX: 21.4 KG/M2 | HEART RATE: 90 BPM | SYSTOLIC BLOOD PRESSURE: 118 MMHG | TEMPERATURE: 97.6 F

## 2021-09-01 DIAGNOSIS — C34.90 MALIGNANT NEOPLASM OF LUNG, UNSPECIFIED LATERALITY, UNSPECIFIED PART OF LUNG (HCC): Primary | ICD-10-CM

## 2021-09-01 PROCEDURE — 99999 PR OFFICE/OUTPT VISIT,PROCEDURE ONLY: CPT | Performed by: RADIOLOGY

## 2021-09-01 PROCEDURE — 77427 RADIATION TX MANAGEMENT X5: CPT | Performed by: RADIOLOGY

## 2021-09-01 PROCEDURE — 77014 PR CT GUIDANCE PLACEMENT RAD THERAPY FIELDS: CPT | Performed by: RADIOLOGY

## 2021-09-01 PROCEDURE — 77386 HC NTSTY MODUL RAD TX DLVR CPLX: CPT | Performed by: RADIOLOGY

## 2021-09-01 NOTE — PROGRESS NOTES
Anival Warren  9/1/2021  Wt Readings from Last 3 Encounters:   09/01/21 144 lb 8 oz (65.5 kg)   08/25/21 142 lb 12.8 oz (64.8 kg)   08/18/21 145 lb (65.8 kg)     Body mass index is 21.4 kg/m². Treatment Area:L Lung    Patient was seen today for weekly visit. Comfort Alteration  KPS:70%  Fatigue: Mild    Ventilation Alterations  Cough: Yes  Hemoptysis: No  Mucus Color: white  Dyspnea: No  O2 Sat: 98%    Nutritional Alteration  Anorexia: No  Nausea: No   Vomiting: No     Skin Alteration   Sensation:no    Radiation Dermatitis:  no    Mucous Membrane Alteration  Voice Changes/ Stridor/Larynx: no  Pharynx & Esophagus: no    Elimination Alterations  Constipation: no  Diarrhea:  no      Emotional  Coping: somewhat effective      Injury, potential bleeding or infection: no    Other:no    Lab Results   Component Value Date    WBC 4.3 (L) 08/23/2021     08/23/2021         /62   Pulse 90   Temp 97.6 °F (36.4 °C) (Temporal)   Resp 18   Wt 144 lb 8 oz (65.5 kg)   SpO2 98%   BMI 21.40 kg/m²   BP within normal range? yes       Assessment/Plan: Pt completed 27/30fx and 5400/6000cGy. Pt is tolerating tx well thus far.     Mabel Buckner RN

## 2021-09-02 ENCOUNTER — HOSPITAL ENCOUNTER (OUTPATIENT)
Dept: RADIATION ONCOLOGY | Age: 71
Discharge: HOME | End: 2021-09-02
Attending: RADIOLOGY
Payer: COMMERCIAL

## 2021-09-02 PROCEDURE — 77014 PR CT GUIDANCE PLACEMENT RAD THERAPY FIELDS: CPT | Performed by: RADIOLOGY

## 2021-09-02 PROCEDURE — 77386 HC NTSTY MODUL RAD TX DLVR CPLX: CPT | Performed by: RADIOLOGY

## 2021-09-02 NOTE — PATIENT INSTRUCTIONS
Continue daily fractionated radiation therapy as scheduled. Please see weekly OTV note and intial consultation letter in [facility] for clinical details. Santo Marks. Pieter Rawls MD MS [address] Bevel:  [phone]   FAX: [phone]  [address] [facility] [address]:  [phone]   FAX:    [phone] [address]:  [phone]   FAX:  [phone]  Email: [email or web address]

## 2021-09-02 NOTE — PROGRESS NOTES
DEPARTMENT OF RADIATION ONCOLOGY ON TREATMENT VISIT         9/2/2021      NAME:  Zi Levine    YOB: 1950    Diagnosis: lung cancer    SUBJECTIVE:   Zi Levine has now received fractionated external beam radiation therapy - ongoing. Past medical, surgical, social and family histories reviewed and updated as indicated. Pain: controlled    ALLERGIES:  Patient has no known allergies. Current Outpatient Medications   Medication Sig Dispense Refill    sucralfate (CARAFATE) 1 GM/10ML suspension Take 10 mLs by mouth 4 times daily 1200 mL 3    albuterol (ACCUNEB) 0.63 MG/3ML nebulizer solution Take 1 ampule by nebulization every 6 hours as needed for Wheezing      fluticasone-umeclidin-vilant (TRELEGY ELLIPTA) 100-62.5-25 MCG/INH AEPB Inhale 1 puff into the lungs daily      ferrous sulfate (FE TABS) 325 (65 Fe) MG EC tablet Take 1 tablet by mouth 2 times daily 90 tablet 3    losartan (COZAAR) 100 MG tablet Take 1 tablet by mouth daily 30 tablet 5    atorvastatin (LIPITOR) 10 MG tablet Take 1 tablet by mouth daily 30 tablet 5    amiodarone (PACERONE) 100 MG tablet Take 1 tablet by mouth daily 30 tablet 3    apixaban (ELIQUIS) 2.5 MG TABS tablet Take 2.5 mg by mouth 2 times daily       No current facility-administered medications for this encounter. OBJECTIVE:  Alert and fully ambulatory. Pleasant and conversant. Physical Examination: General appearance - alert, well appearing, and in no distress. Wt Readings from Last 3 Encounters:   09/01/21 144 lb 8 oz (65.5 kg)   08/25/21 142 lb 12.8 oz (64.8 kg)   08/18/21 145 lb (65.8 kg)         ASSESSMENT/PLAN:     Patient is tolerating treatments well with expected toxicities. RBA were reviewed prior to first fraction and PRN. Current and planned dose reviewed. Goals of treatment and potential side effects were reviewed with the patient PRN.  Treatment imaging has been personally reviewed for accuracy and precision. Questions answered to apparent satisfaction. Treatments will continue as planned. Woody Hernandez.  Veda Milton MD MS DABR  Radiation Oncologist        [facility] ([address] [place] [address]): [phone] /// FAX: [phone]  [facility]): [phone] /// FAX: [phone]  [address] [place] [address]): [phone] /// FAX: [phone]

## 2021-09-02 NOTE — PROGRESS NOTES
DEPARTMENT OF RADIATION ONCOLOGY ON TREATMENT VISIT         8/18/21      NAME:  Zi Levine    YOB: 1950    Diagnosis: lung cancer    SUBJECTIVE:   Zi Levine has now received fractionated external beam radiation therapy - ongoing. Past medical, surgical, social and family histories reviewed and updated as indicated. Pain: controlled    ALLERGIES:  Patient has no known allergies. Current Outpatient Medications   Medication Sig Dispense Refill    sucralfate (CARAFATE) 1 GM/10ML suspension Take 10 mLs by mouth 4 times daily 1200 mL 3    albuterol (ACCUNEB) 0.63 MG/3ML nebulizer solution Take 1 ampule by nebulization every 6 hours as needed for Wheezing      fluticasone-umeclidin-vilant (TRELEGY ELLIPTA) 100-62.5-25 MCG/INH AEPB Inhale 1 puff into the lungs daily      ferrous sulfate (FE TABS) 325 (65 Fe) MG EC tablet Take 1 tablet by mouth 2 times daily 90 tablet 3    losartan (COZAAR) 100 MG tablet Take 1 tablet by mouth daily 30 tablet 5    atorvastatin (LIPITOR) 10 MG tablet Take 1 tablet by mouth daily 30 tablet 5    amiodarone (PACERONE) 100 MG tablet Take 1 tablet by mouth daily 30 tablet 3    apixaban (ELIQUIS) 2.5 MG TABS tablet Take 2.5 mg by mouth 2 times daily       No current facility-administered medications for this encounter. OBJECTIVE:  Alert and fully ambulatory. Pleasant and conversant. Physical Examination: General appearance - alert, well appearing, and in no distress. Wt Readings from Last 3 Encounters:   09/01/21 144 lb 8 oz (65.5 kg)   08/25/21 142 lb 12.8 oz (64.8 kg)   08/18/21 145 lb (65.8 kg)         ASSESSMENT/PLAN:     Patient is tolerating treatments well with expected toxicities. RBA were reviewed prior to first fraction and PRN. Current and planned dose reviewed. Goals of treatment and potential side effects were reviewed with the patient PRN.  Treatment imaging has been personally reviewed for accuracy and precision. Questions answered to apparent satisfaction. Treatments will continue as planned. Kimberly Navarro.  Soraya Amaya MD MS DABR  Radiation Oncologist        [address] ([address] [place] [address]): [phone] /// FAX: [phone]  [facility]): [phone] /// FAX: [phone]  [facility]): [phone] /// FAX: [phone]

## 2021-09-03 ENCOUNTER — HOSPITAL ENCOUNTER (OUTPATIENT)
Dept: RADIATION ONCOLOGY | Age: 71
Discharge: HOME | End: 2021-09-03
Attending: RADIOLOGY
Payer: COMMERCIAL

## 2021-09-03 PROCEDURE — 77386 HC NTSTY MODUL RAD TX DLVR CPLX: CPT | Performed by: RADIOLOGY

## 2021-09-03 PROCEDURE — 77014 PR CT GUIDANCE PLACEMENT RAD THERAPY FIELDS: CPT | Performed by: RADIOLOGY

## 2021-09-03 NOTE — PATIENT INSTRUCTIONS
Continue daily fractionated radiation therapy as scheduled. Please see weekly OTV note and intial consultation letter in [facility] for clinical details. Ayanna Frazier. Gaetano Harkins MD MS Ai Mcclendon:  [phone]   FAX: [phone]  [facility] Street:  [phone]   FAX:    [phone]  [address] [place] [address] Road:  [phone]   FAX:  [phone]  Email: [email or web address]

## 2021-09-03 NOTE — PROGRESS NOTES
DEPARTMENT OF RADIATION ONCOLOGY ON TREATMENT VISIT         9/3/2021      NAME:  Ricardo Sam    YOB: 1950    Diagnosis: lung cancer    SUBJECTIVE:   Ricardo Sam has now received fractionated external beam radiation therapy - ongoing. Past medical, surgical, social and family histories reviewed and updated as indicated. Pain: controlled    ALLERGIES:  Patient has no known allergies. Current Outpatient Medications   Medication Sig Dispense Refill    sucralfate (CARAFATE) 1 GM/10ML suspension Take 10 mLs by mouth 4 times daily 1200 mL 3    albuterol (ACCUNEB) 0.63 MG/3ML nebulizer solution Take 1 ampule by nebulization every 6 hours as needed for Wheezing      fluticasone-umeclidin-vilant (TRELEGY ELLIPTA) 100-62.5-25 MCG/INH AEPB Inhale 1 puff into the lungs daily      ferrous sulfate (FE TABS) 325 (65 Fe) MG EC tablet Take 1 tablet by mouth 2 times daily 90 tablet 3    losartan (COZAAR) 100 MG tablet Take 1 tablet by mouth daily 30 tablet 5    atorvastatin (LIPITOR) 10 MG tablet Take 1 tablet by mouth daily 30 tablet 5    amiodarone (PACERONE) 100 MG tablet Take 1 tablet by mouth daily 30 tablet 3    apixaban (ELIQUIS) 2.5 MG TABS tablet Take 2.5 mg by mouth 2 times daily       No current facility-administered medications for this encounter. OBJECTIVE:  Alert and fully ambulatory. Pleasant and conversant. Physical Examination: General appearance - alert, well appearing, and in no distress. Wt Readings from Last 3 Encounters:   09/01/21 144 lb 8 oz (65.5 kg)   08/25/21 142 lb 12.8 oz (64.8 kg)   08/18/21 145 lb (65.8 kg)         ASSESSMENT/PLAN:     Patient is tolerating treatments well with expected toxicities. RBA were reviewed prior to first fraction and PRN. Current and planned dose reviewed. Goals of treatment and potential side effects were reviewed with the patient PRN.  Treatment imaging has been personally reviewed for accuracy and precision. Questions answered to apparent satisfaction. Treatments will continue as planned. Ronel Chacon.  Alpa Murdock MD MS DABR  Radiation Oncologist        [facility] ([address] [place] [address]): [phone] /// FAX: [phone]  [facility]): [phone] /// FAX: [phone]  [address] [place] [address]): [phone] /// FAX: [phone]

## 2021-09-07 ENCOUNTER — HOSPITAL ENCOUNTER (OUTPATIENT)
Dept: RADIATION ONCOLOGY | Age: 71
Discharge: HOME | End: 2021-09-07
Attending: RADIOLOGY
Payer: COMMERCIAL

## 2021-09-07 PROCEDURE — 77386 HC NTSTY MODUL RAD TX DLVR CPLX: CPT | Performed by: RADIOLOGY

## 2021-09-07 PROCEDURE — 77336 RADIATION PHYSICS CONSULT: CPT | Performed by: RADIOLOGY

## 2021-09-07 PROCEDURE — 77427 RADIATION TX MANAGEMENT X5: CPT | Performed by: RADIOLOGY

## 2021-09-07 PROCEDURE — 77014 PR CT GUIDANCE PLACEMENT RAD THERAPY FIELDS: CPT | Performed by: RADIOLOGY

## 2021-09-07 NOTE — PROGRESS NOTES
Regina Ahuja  9/7/2021  7:41 AM          Current Outpatient Medications   Medication Sig Dispense Refill    sucralfate (CARAFATE) 1 GM/10ML suspension Take 10 mLs by mouth 4 times daily 1200 mL 3    albuterol (ACCUNEB) 0.63 MG/3ML nebulizer solution Take 1 ampule by nebulization every 6 hours as needed for Wheezing      fluticasone-umeclidin-vilant (TRELEGY ELLIPTA) 100-62.5-25 MCG/INH AEPB Inhale 1 puff into the lungs daily      ferrous sulfate (FE TABS) 325 (65 Fe) MG EC tablet Take 1 tablet by mouth 2 times daily 90 tablet 3    losartan (COZAAR) 100 MG tablet Take 1 tablet by mouth daily 30 tablet 5    atorvastatin (LIPITOR) 10 MG tablet Take 1 tablet by mouth daily 30 tablet 5    amiodarone (PACERONE) 100 MG tablet Take 1 tablet by mouth daily 30 tablet 3    apixaban (ELIQUIS) 2.5 MG TABS tablet Take 2.5 mg by mouth 2 times daily       No current facility-administered medications for this encounter. This is an up-to-date medication list.    Please take this list to your next care provider, and discard any previous medication lists.

## 2021-09-13 DIAGNOSIS — C34.90 PRIMARY ADENOCARCINOMA OF LUNG, UNSPECIFIED LATERALITY (HCC): Primary | ICD-10-CM

## 2021-09-13 RX ORDER — ALBUTEROL SULFATE 90 UG/1
2 AEROSOL, METERED RESPIRATORY (INHALATION) EVERY 6 HOURS PRN
Qty: 18 G | Refills: 3 | Status: SHIPPED
Start: 2021-09-13 | End: 2021-10-12 | Stop reason: SDUPTHER

## 2021-09-24 ENCOUNTER — APPOINTMENT (OUTPATIENT)
Dept: GENERAL RADIOLOGY | Age: 71
DRG: 871 | End: 2021-09-24
Payer: COMMERCIAL

## 2021-09-24 ENCOUNTER — HOSPITAL ENCOUNTER (INPATIENT)
Age: 71
LOS: 5 days | Discharge: HOME | DRG: 871 | End: 2021-09-30
Attending: EMERGENCY MEDICINE | Admitting: FAMILY MEDICINE
Payer: COMMERCIAL

## 2021-09-24 DIAGNOSIS — N17.9 AKI (ACUTE KIDNEY INJURY) (HCC): ICD-10-CM

## 2021-09-24 DIAGNOSIS — A41.9 SEPTICEMIA (HCC): Primary | ICD-10-CM

## 2021-09-24 LAB
ALBUMIN SERPL-MCNC: 4.1 G/DL (ref 3.5–5.2)
ALP BLD-CCNC: 57 U/L (ref 40–129)
ALT SERPL-CCNC: 6 U/L (ref 0–40)
ANION GAP SERPL CALCULATED.3IONS-SCNC: 13 MMOL/L (ref 7–16)
AST SERPL-CCNC: 19 U/L (ref 0–39)
BILIRUB SERPL-MCNC: 0.9 MG/DL (ref 0–1.2)
BUN BLDV-MCNC: 15 MG/DL (ref 6–23)
CALCIUM SERPL-MCNC: 9 MG/DL (ref 8.6–10.2)
CHLORIDE BLD-SCNC: 103 MMOL/L (ref 98–107)
CO2: 22 MMOL/L (ref 22–29)
CREAT SERPL-MCNC: 1.5 MG/DL (ref 0.7–1.2)
GFR AFRICAN AMERICAN: 56
GFR NON-AFRICAN AMERICAN: 46 ML/MIN/1.73
GLUCOSE BLD-MCNC: 105 MG/DL (ref 74–99)
LACTIC ACID: 1.7 MMOL/L (ref 0.5–2.2)
LIPASE: 15 U/L (ref 13–60)
POTASSIUM REFLEX MAGNESIUM: 3.8 MMOL/L (ref 3.5–5)
SARS-COV-2, NAAT: NOT DETECTED
SODIUM BLD-SCNC: 138 MMOL/L (ref 132–146)
TOTAL PROTEIN: 6.2 G/DL (ref 6.4–8.3)

## 2021-09-24 PROCEDURE — 80053 COMPREHEN METABOLIC PANEL: CPT

## 2021-09-24 PROCEDURE — 99285 EMERGENCY DEPT VISIT HI MDM: CPT

## 2021-09-24 PROCEDURE — 87635 SARS-COV-2 COVID-19 AMP PRB: CPT

## 2021-09-24 PROCEDURE — 2580000003 HC RX 258: Performed by: STUDENT IN AN ORGANIZED HEALTH CARE EDUCATION/TRAINING PROGRAM

## 2021-09-24 PROCEDURE — 84145 PROCALCITONIN (PCT): CPT

## 2021-09-24 PROCEDURE — 83690 ASSAY OF LIPASE: CPT

## 2021-09-24 PROCEDURE — 87040 BLOOD CULTURE FOR BACTERIA: CPT

## 2021-09-24 PROCEDURE — 36415 COLL VENOUS BLD VENIPUNCTURE: CPT

## 2021-09-24 PROCEDURE — 6370000000 HC RX 637 (ALT 250 FOR IP): Performed by: STUDENT IN AN ORGANIZED HEALTH CARE EDUCATION/TRAINING PROGRAM

## 2021-09-24 PROCEDURE — 71046 X-RAY EXAM CHEST 2 VIEWS: CPT

## 2021-09-24 PROCEDURE — 96360 HYDRATION IV INFUSION INIT: CPT

## 2021-09-24 PROCEDURE — 83605 ASSAY OF LACTIC ACID: CPT

## 2021-09-24 PROCEDURE — 85025 COMPLETE CBC W/AUTO DIFF WBC: CPT

## 2021-09-24 RX ORDER — ACETAMINOPHEN 500 MG
1000 TABLET ORAL ONCE
Status: COMPLETED | OUTPATIENT
Start: 2021-09-24 | End: 2021-09-24

## 2021-09-24 RX ORDER — 0.9 % SODIUM CHLORIDE 0.9 %
1000 INTRAVENOUS SOLUTION INTRAVENOUS ONCE
Status: COMPLETED | OUTPATIENT
Start: 2021-09-24 | End: 2021-09-25

## 2021-09-24 RX ADMIN — SODIUM CHLORIDE 1000 ML: 9 INJECTION, SOLUTION INTRAVENOUS at 23:19

## 2021-09-24 RX ADMIN — ACETAMINOPHEN 1000 MG: 500 TABLET ORAL at 23:19

## 2021-09-25 ENCOUNTER — APPOINTMENT (OUTPATIENT)
Dept: CT IMAGING | Age: 71
DRG: 871 | End: 2021-09-25
Payer: COMMERCIAL

## 2021-09-25 LAB
BASOPHILS ABSOLUTE: 0.03 E9/L (ref 0–0.2)
BASOPHILS RELATIVE PERCENT: 0.9 % (ref 0–2)
EOSINOPHILS ABSOLUTE: 0 E9/L (ref 0.05–0.5)
EOSINOPHILS RELATIVE PERCENT: 0 % (ref 0–6)
HCT VFR BLD CALC: 27.5 % (ref 37–54)
HEMOGLOBIN: 9 G/DL (ref 12.5–16.5)
HYPOCHROMIA: ABNORMAL
LYMPHOCYTES ABSOLUTE: 0.06 E9/L (ref 1.5–4)
LYMPHOCYTES RELATIVE PERCENT: 1.7 % (ref 20–42)
MCH RBC QN AUTO: 30.5 PG (ref 26–35)
MCHC RBC AUTO-ENTMCNC: 32.7 % (ref 32–34.5)
MCV RBC AUTO: 93.2 FL (ref 80–99.9)
MONOCYTES ABSOLUTE: 0.06 E9/L (ref 0.1–0.95)
MONOCYTES RELATIVE PERCENT: 1.7 % (ref 2–12)
NEUTROPHILS ABSOLUTE: 2.69 E9/L (ref 1.8–7.3)
NEUTROPHILS RELATIVE PERCENT: 95.7 % (ref 43–80)
NUCLEATED RED BLOOD CELLS: 0.9 /100 WBC
OVALOCYTES: ABNORMAL
PDW BLD-RTO: 19 FL (ref 11.5–15)
PLATELET # BLD: 127 E9/L (ref 130–450)
PMV BLD AUTO: 9.4 FL (ref 7–12)
POIKILOCYTES: ABNORMAL
POLYCHROMASIA: ABNORMAL
PROCALCITONIN: 3.4 NG/ML (ref 0–0.08)
RBC # BLD: 2.95 E12/L (ref 3.8–5.8)
WBC # BLD: 2.8 E9/L (ref 4.5–11.5)

## 2021-09-25 PROCEDURE — 2580000003 HC RX 258: Performed by: STUDENT IN AN ORGANIZED HEALTH CARE EDUCATION/TRAINING PROGRAM

## 2021-09-25 PROCEDURE — 2580000003 HC RX 258: Performed by: FAMILY MEDICINE

## 2021-09-25 PROCEDURE — 6360000004 HC RX CONTRAST MEDICATION: Performed by: RADIOLOGY

## 2021-09-25 PROCEDURE — 71275 CT ANGIOGRAPHY CHEST: CPT

## 2021-09-25 PROCEDURE — 6360000002 HC RX W HCPCS: Performed by: FAMILY MEDICINE

## 2021-09-25 PROCEDURE — 74177 CT ABD & PELVIS W/CONTRAST: CPT

## 2021-09-25 PROCEDURE — 6360000002 HC RX W HCPCS: Performed by: STUDENT IN AN ORGANIZED HEALTH CARE EDUCATION/TRAINING PROGRAM

## 2021-09-25 PROCEDURE — 6370000000 HC RX 637 (ALT 250 FOR IP): Performed by: FAMILY MEDICINE

## 2021-09-25 PROCEDURE — 94640 AIRWAY INHALATION TREATMENT: CPT

## 2021-09-25 PROCEDURE — 2140000000 HC CCU INTERMEDIATE R&B

## 2021-09-25 PROCEDURE — 94664 DEMO&/EVAL PT USE INHALER: CPT

## 2021-09-25 RX ORDER — SODIUM CHLORIDE 9 MG/ML
INJECTION, SOLUTION INTRAVENOUS CONTINUOUS
Status: DISCONTINUED | OUTPATIENT
Start: 2021-09-25 | End: 2021-09-30 | Stop reason: HOSPADM

## 2021-09-25 RX ORDER — ACETAMINOPHEN 650 MG/1
650 SUPPOSITORY RECTAL EVERY 6 HOURS PRN
Status: DISCONTINUED | OUTPATIENT
Start: 2021-09-25 | End: 2021-09-30 | Stop reason: HOSPADM

## 2021-09-25 RX ORDER — BUDESONIDE 0.25 MG/2ML
0.25 INHALANT ORAL 2 TIMES DAILY
Status: DISCONTINUED | OUTPATIENT
Start: 2021-09-25 | End: 2021-09-30 | Stop reason: HOSPADM

## 2021-09-25 RX ORDER — SODIUM CHLORIDE 0.9 % (FLUSH) 0.9 %
10 SYRINGE (ML) INJECTION EVERY 12 HOURS SCHEDULED
Status: DISCONTINUED | OUTPATIENT
Start: 2021-09-25 | End: 2021-09-30 | Stop reason: HOSPADM

## 2021-09-25 RX ORDER — AMIODARONE HYDROCHLORIDE 200 MG/1
100 TABLET ORAL DAILY
Status: DISCONTINUED | OUTPATIENT
Start: 2021-09-25 | End: 2021-09-30 | Stop reason: HOSPADM

## 2021-09-25 RX ORDER — POLYETHYLENE GLYCOL 3350 17 G/17G
17 POWDER, FOR SOLUTION ORAL DAILY PRN
Status: DISCONTINUED | OUTPATIENT
Start: 2021-09-25 | End: 2021-09-30 | Stop reason: HOSPADM

## 2021-09-25 RX ORDER — ONDANSETRON 2 MG/ML
4 INJECTION INTRAMUSCULAR; INTRAVENOUS EVERY 6 HOURS PRN
Status: DISCONTINUED | OUTPATIENT
Start: 2021-09-25 | End: 2021-09-30 | Stop reason: HOSPADM

## 2021-09-25 RX ORDER — FERROUS SULFATE 325(65) MG
325 TABLET ORAL 2 TIMES DAILY WITH MEALS
Status: DISCONTINUED | OUTPATIENT
Start: 2021-09-25 | End: 2021-09-30 | Stop reason: HOSPADM

## 2021-09-25 RX ORDER — ALBUTEROL SULFATE 0.63 MG/3ML
1 SOLUTION RESPIRATORY (INHALATION) EVERY 6 HOURS PRN
Status: DISCONTINUED | OUTPATIENT
Start: 2021-09-25 | End: 2021-09-30 | Stop reason: HOSPADM

## 2021-09-25 RX ORDER — ARFORMOTEROL TARTRATE 15 UG/2ML
15 SOLUTION RESPIRATORY (INHALATION) 2 TIMES DAILY
Status: DISCONTINUED | OUTPATIENT
Start: 2021-09-25 | End: 2021-09-30 | Stop reason: HOSPADM

## 2021-09-25 RX ORDER — FERROUS SULFATE 325(65) MG
325 TABLET ORAL 2 TIMES DAILY
Status: DISCONTINUED | OUTPATIENT
Start: 2021-09-25 | End: 2021-09-25

## 2021-09-25 RX ORDER — SUCRALFATE 1 G/1
1 TABLET ORAL EVERY 6 HOURS SCHEDULED
Status: DISCONTINUED | OUTPATIENT
Start: 2021-09-25 | End: 2021-09-30 | Stop reason: HOSPADM

## 2021-09-25 RX ORDER — LOSARTAN POTASSIUM 50 MG/1
100 TABLET ORAL DAILY
Status: DISCONTINUED | OUTPATIENT
Start: 2021-09-25 | End: 2021-09-30 | Stop reason: HOSPADM

## 2021-09-25 RX ORDER — SODIUM CHLORIDE 0.9 % (FLUSH) 0.9 %
10 SYRINGE (ML) INJECTION PRN
Status: DISCONTINUED | OUTPATIENT
Start: 2021-09-25 | End: 2021-09-30 | Stop reason: HOSPADM

## 2021-09-25 RX ORDER — PROMETHAZINE HYDROCHLORIDE 25 MG/1
12.5 TABLET ORAL EVERY 6 HOURS PRN
Status: DISCONTINUED | OUTPATIENT
Start: 2021-09-25 | End: 2021-09-30 | Stop reason: HOSPADM

## 2021-09-25 RX ORDER — ACETAMINOPHEN 325 MG/1
650 TABLET ORAL EVERY 6 HOURS PRN
Status: DISCONTINUED | OUTPATIENT
Start: 2021-09-25 | End: 2021-09-30 | Stop reason: HOSPADM

## 2021-09-25 RX ORDER — ATORVASTATIN CALCIUM 10 MG/1
10 TABLET, FILM COATED ORAL DAILY
Status: DISCONTINUED | OUTPATIENT
Start: 2021-09-25 | End: 2021-09-30 | Stop reason: HOSPADM

## 2021-09-25 RX ORDER — SODIUM CHLORIDE 9 MG/ML
25 INJECTION, SOLUTION INTRAVENOUS PRN
Status: DISCONTINUED | OUTPATIENT
Start: 2021-09-25 | End: 2021-09-30 | Stop reason: HOSPADM

## 2021-09-25 RX ADMIN — VANCOMYCIN HYDROCHLORIDE 1000 MG: 1 INJECTION, POWDER, LYOPHILIZED, FOR SOLUTION INTRAVENOUS at 17:35

## 2021-09-25 RX ADMIN — LOSARTAN POTASSIUM 100 MG: 50 TABLET, FILM COATED ORAL at 11:54

## 2021-09-25 RX ADMIN — APIXABAN 2.5 MG: 2.5 TABLET, FILM COATED ORAL at 21:21

## 2021-09-25 RX ADMIN — IOPAMIDOL 90 ML: 755 INJECTION, SOLUTION INTRAVENOUS at 00:46

## 2021-09-25 RX ADMIN — VANCOMYCIN HYDROCHLORIDE 1250 MG: 10 INJECTION, POWDER, LYOPHILIZED, FOR SOLUTION INTRAVENOUS at 03:36

## 2021-09-25 RX ADMIN — ACETAMINOPHEN 650 MG: 325 TABLET ORAL at 22:16

## 2021-09-25 RX ADMIN — AMIODARONE HYDROCHLORIDE 100 MG: 200 TABLET ORAL at 11:55

## 2021-09-25 RX ADMIN — ATORVASTATIN CALCIUM 10 MG: 10 TABLET, FILM COATED ORAL at 11:55

## 2021-09-25 RX ADMIN — BUDESONIDE 250 MCG: 0.25 SUSPENSION RESPIRATORY (INHALATION) at 20:27

## 2021-09-25 RX ADMIN — SODIUM CHLORIDE: 9 INJECTION, SOLUTION INTRAVENOUS at 11:59

## 2021-09-25 RX ADMIN — CEFEPIME 2000 MG: 2 INJECTION, POWDER, FOR SOLUTION INTRAVENOUS at 02:13

## 2021-09-25 RX ADMIN — ARFORMOTEROL TARTRATE 15 MCG: 15 SOLUTION RESPIRATORY (INHALATION) at 20:27

## 2021-09-25 RX ADMIN — FERROUS SULFATE TAB 325 MG (65 MG ELEMENTAL FE) 325 MG: 325 (65 FE) TAB at 11:54

## 2021-09-25 RX ADMIN — SUCRALFATE 1 G: 1 TABLET ORAL at 11:54

## 2021-09-25 RX ADMIN — SUCRALFATE 1 G: 1 TABLET ORAL at 18:43

## 2021-09-25 RX ADMIN — FERROUS SULFATE TAB 325 MG (65 MG ELEMENTAL FE) 325 MG: 325 (65 FE) TAB at 17:35

## 2021-09-25 RX ADMIN — APIXABAN 2.5 MG: 2.5 TABLET, FILM COATED ORAL at 11:55

## 2021-09-25 RX ADMIN — IPRATROPIUM BROMIDE 0.5 MG: 0.5 SOLUTION RESPIRATORY (INHALATION) at 16:09

## 2021-09-25 RX ADMIN — IPRATROPIUM BROMIDE 0.5 MG: 0.5 SOLUTION RESPIRATORY (INHALATION) at 20:27

## 2021-09-25 ASSESSMENT — ENCOUNTER SYMPTOMS
COUGH: 0
ABDOMINAL PAIN: 1
WHEEZING: 0
EYE DISCHARGE: 0
BACK PAIN: 0
NAUSEA: 0
SHORTNESS OF BREATH: 0
SINUS PRESSURE: 0
EYE PAIN: 0
DIARRHEA: 0
SORE THROAT: 0
EYE REDNESS: 0
VOMITING: 0

## 2021-09-25 ASSESSMENT — PAIN SCALES - GENERAL
PAINLEVEL_OUTOF10: 0

## 2021-09-25 NOTE — ED NOTES
Patient ambulatory to bathroom, having diarrhea. Changed into gown and taking oral fluids well.  Still awaiting bed on floor     Dalton Gallardo RN  09/25/21 1698

## 2021-09-25 NOTE — ED PROVIDER NOTES
[facility]  Department of Emergency Medicine     Written by: Karla Ordaz DO  Patient Name: Jorge Alexandre  Attending Provider: Cuco Jackson MD  Admit Date: 2021 10:47 PM  MRN: [id number]                   : 1950        Chief Complaint   Patient presents with    Fatigue     Weakness for last few days. Per EMS pt's daughter checked BP and was low    - Chief complaint    Patient is a 73-year-old male past medical history of hyperlipidemia, hypertension adenocarcinoma of the lung. Patient went to chief complaint of fatigue, weakness and low blood pressure. Patient was at home with daughter. Daughter notes the patient has had increasing fatigue and weakness over the last couple days. States that this morning patient seemed especially lethargic, she checked his blood pressure blood pressure was found to be 90/40. On arrival patient does endorse some generalized fatigue as well as mild abdominal pain. Patient describes the pain as a dull generalized aching sensation. Currently rates pain a 3 out of 10. He states that symptoms have been moderate in severity and constant since onset. Denies any exacerbating or relieving factors. He notes that he has been eating and drinking well. Patient is compliant with his chemotherapy. Patient is up-to-date on his COVID-19 vaccine. Patient denies any fevers, chills, nausea, vomiting, chest pain, cough or shortness of breath. The history is provided by the patient. No  was used. Review of Systems   Constitutional: Positive for fatigue. Negative for chills and fever. HENT: Negative for ear pain, sinus pressure and sore throat. Eyes: Negative for pain, discharge and redness. Respiratory: Negative for cough, shortness of breath and wheezing. Cardiovascular: Negative for chest pain. Gastrointestinal: Positive for abdominal pain. Negative for diarrhea, nausea and vomiting.    Genitourinary: Negative for dysuria and frequency. Musculoskeletal: Negative for arthralgias and back pain. Skin: Negative for rash and wound. Neurological: Positive for weakness. Negative for headaches. Hematological: Negative for adenopathy. All other systems reviewed and are negative. Physical Exam  Vitals and nursing note reviewed. Constitutional:       Appearance: He is well-developed. HENT:      Head: Normocephalic and atraumatic. Eyes:      Conjunctiva/sclera: Conjunctivae normal.   Cardiovascular:      Rate and Rhythm: Normal rate and regular rhythm. Heart sounds: Normal heart sounds. No murmur heard. Pulmonary:      Effort: Pulmonary effort is normal. No respiratory distress. Breath sounds: Normal breath sounds. No wheezing or rales. Abdominal:      General: Bowel sounds are normal.      Palpations: Abdomen is soft. Tenderness: There is abdominal tenderness (generalized). There is no guarding or rebound. Musculoskeletal:         General: No tenderness or deformity. Cervical back: Normal range of motion and neck supple. Skin:     General: Skin is warm and dry. Neurological:      Mental Status: He is alert and oriented to person, place, and time. Cranial Nerves: No cranial nerve deficit. Coordination: Coordination normal.          Procedures       MDM  Number of Diagnoses or Management Options  ALBAN (acute kidney injury) ([facility]  Septicemia ([facility]  Diagnosis management comments: Patient is a 73-year-old male past medical history of hyperlipidemia, hypertension adenocarcinoma the lung. Patient presents with chief complaint of generalized fatigue. Vital signs  on presentation for mild hypertension BP 91/42, mildly tachycardic with a rate of 112 as well as elevated temperature of 100.7. On physical exam heart tachycardic regular rhythm, lungs clear to auscultation bilaterally, abdomen soft with mild generalized abdominal pain no rigidity rebound or guarding. mg/dL    Alkaline Phosphatase 57 40 - 129 U/L    ALT 6 0 - 40 U/L    AST 19 0 - 39 U/L   Lipase   Result Value Ref Range    Lipase 15 13 - 60 U/L   Lactic Acid, Plasma   Result Value Ref Range    Lactic Acid 1.7 0.5 - 2.2 mmol/L       RADIOLOGY:  CT ABDOMEN PELVIS W IV CONTRAST Additional Contrast? None   Final Result   Mild diverticulosis. Heterogeneous enlarged prostate. Subtle area of sclerosis in the left iliac wing is more conspicuous than   previous. Developing metastatic disease is not excluded and follow-up within   3 months is recommended. This could also be further assessed with MRI as   indicated clinically. Other chronic appearing findings. CTA PULMONARY W CONTRAST   Final Result   No evidence of pulmonary emboli. Status post left upper lobectomy with large cavity in the left upper lung   similar to the prior examination. Multifocal interstitial coarsening and pleuroparenchymal scarring in the left   lower lung with surgical sutures, similar to the prior examination. Stable lobulated nodular mass measuring approximately 1.2 cm in diameter in   the area of the lingula. .      Pleural based densities likely pleuroparenchymal scarring in the lower lungs   bilaterally, unchanged. Nonspecific hilar and mediastinal nodes. XR CHEST (2 VW)   Final Result   No significant change. Continued follow-up recommended. Please see report   from recent chest CT dated 08/23/2021.             ------------------------- NURSING NOTES AND VITALS REVIEWED ---------------------------  Date / Time Roomed:  9/24/2021 10:47 PM  ED Bed Assignment:  Earline Parson    The nursing notes within the ED encounter and vital signs as below have been reviewed.      Patient Vitals for the past 24 hrs:   BP Temp Temp src Pulse Resp SpO2 Height Weight   09/24/21 2358 (!) 125/57 -- -- 106 17 95 % -- --   09/24/21 2248 (!) 91/42 100.7 °F (38.2 °C) Oral 114 20 94 % 5' 9\" (1.753 m) 147 lb (66.7 kg) Oxygen Saturation Interpretation: Normal    ------------------------------------------ PROGRESS NOTES ------------------------------------------  Re-evaluation(s):  Time: 0100  Patients symptoms show no change  Repeat physical examination is not changed    Counseling:  I have spoken with the patient and discussed todays results, in addition to providing specific details for the plan of care and counseling regarding the diagnosis and prognosis. Their questions are answered at this time and they are agreeable with the plan of admission.    --------------------------------- ADDITIONAL PROVIDER NOTES ---------------------------------  Consultations:  Time: 0145. Spoke with Dr. Simone Zee. Discussed case. They will admit the patient. This patient's ED course included: a personal history and physicial examination, re-evaluation prior to disposition and multiple bedside re-evaluations    This patient has remained hemodynamically stable during their ED course. Diagnosis:  1. Septicemia ([facility]    2. ALBAN (acute kidney injury) ([facility] [address]        Disposition:  Patient's disposition: Admit to med/surg floor  Patient's condition is stable. Patient was seen and evaluated by myself and my attending Cuco Jackson MD. Assessment and Plan discussed with attending provider, please see attestation for final plan of care.      DO Don Engel DO  Resident  09/25/21 5872

## 2021-09-25 NOTE — H&P
Hospitalist History & Physical      PCP: Tressa Whaley DO    Date of Admission: 9/24/2021    Date of Service: Pt seen/examined on 9/24/2021     Chief Complaint:  had concerns including Fatigue (Weakness for last few days. Per EMS pt's daughter checked BP and was low). History Of Present Illness:    Mr. Carisa Means, a 80y.o. year old male  who  has a past medical history of Hyperlipidemia, Hypertension, and Lung cancer ([facility] [address] Patient presented to the emergency department with fatigue, weakness and low blood pressure. This is been worsening over the last couple of days. Family states that this morning the patient was lethargic. They checked his blood pressure and it was 90/40. Patient also reports some mild abdominal pain. Vital signs were assessed on arrival and he had a temperature of 100.7, heart rate of 114 and a blood pressure of 91/42. Laboratory studies reveal creatinine 1.5, hemoglobin 9.0. COVID-19 negative. CT chest and abdomen pelvis showed no acute findings. Emergency department personnel empirically cover the patient with vancomycin and cefepime. Medicine was consulted for admission. Past Medical History:   Diagnosis Date    Hyperlipidemia     Hypertension     Lung cancer [facility])        Past Surgical History:   Procedure Laterality Date    CT NEEDLE BIOPSY LUNG PERCUTANEOUS  6/17/2021    CT NEEDLE BIOPSY LUNG PERCUTANEOUS 6/17/2021 Tai Garcia MD [facility] CT    LUNG CANCER SURGERY      PORT SURGERY Right 07/2021       Prior to Admission medications    Medication Sig Start Date End Date Taking?  Authorizing Provider   fluticasone-umeclidin-vilant (TRELEGY ELLIPTA) 100-62.5-25 MCG/INH AEPB Inhale 1 puff into the lungs daily 9/17/21   DEISI Lobo - CNP   albuterol sulfate HFA (PROAIR HFA) 108 (90 Base) MCG/ACT inhaler Inhale 2 puffs into the lungs every 6 hours as needed for Wheezing 9/13/21   Rocio Ferrara DO   sucralfate (CARAFATE) 1 GM/10ML suspension Take 10 mLs by mouth 4 times daily 8/18/21   Aravind Huerta III, MD   albuterol (ACCUNEB) 0.63 MG/3ML nebulizer solution Take 1 ampule by nebulization every 6 hours as needed for Wheezing    Historical Provider, MD   ferrous sulfate (FE TABS) 325 (65 Fe) MG EC tablet Take 1 tablet by mouth 2 times daily 3/17/21   Pat Lung DO Alem   losartan (COZAAR) 100 MG tablet Take 1 tablet by mouth daily 3/16/21   Pat Lung DO Alem   atorvastatin (LIPITOR) 10 MG tablet Take 1 tablet by mouth daily 3/16/21   Paulina Gan DO   amiodarone (PACERONE) 100 MG tablet Take 1 tablet by mouth daily 3/16/21   Paulina Gan DO   apixaban (ELIQUIS) 2.5 MG TABS tablet Take 2.5 mg by mouth 2 times daily 8/31/20   Historical Provider, MD         Allergies:  Patient has no known allergies. Social History:    TOBACCO:   reports that he quit smoking about 14 months ago. His smoking use included cigarettes. He started smoking about 56 years ago. He has a 55.00 pack-year smoking history. He has never used smokeless tobacco.  ETOH:   reports previous alcohol use. Family History:    Reviewed in detail and negative for DM, CAD, Cancer, CVA. Positive as follows\"      Problem Relation Age of Onset    Cancer Brother         bone       REVIEW OF SYSTEMS:   Pertinent positives as noted in the HPI. All other systems reviewed and negative. PHYSICAL EXAM:  BP (!) 125/57   Pulse 106   Temp 100.7 °F (38.2 °C) (Oral)   Resp 17   Ht 5' 9\" (1.753 m)   Wt 147 lb (66.7 kg)   SpO2 95%   BMI 21.71 kg/m²   General appearance: No apparent distress, appears stated age and cooperative. HEENT: Normal cephalic, atraumatic without obvious deformity. Pupils equal, round, and reactive to light. Extra ocular muscles intact. Conjunctivae/corneas clear. Neck: Supple, with full range of motion. No jugular venous distention. Trachea midline.   Respiratory: Clear to auscultation bilaterally  Cardiovascular: Tachycardic  Abdomen: Soft, TTP, nondistended  Musculoskeletal: No clubbing, cyanosis, edema of bilateral lower extremities. Brisk capillary refill. Skin: Normal skin color. No rashes or lesions. Neurologic:  Neurovascularly intact without any focal sensory/motor deficits. Cranial nerves: II-XII intact, grossly non-focal.      CBC:   Recent Labs     09/24/21 2252   WBC 2.8*   RBC 2.95*   HGB 9.0*   HCT 27.5*   MCV 93.2   RDW 19.0*   *     BMP:   Recent Labs     09/24/21 2252      K 3.8      CO2 22   BUN 15   CREATININE 1.5*     LFT:  Recent Labs     09/24/21 2252   PROT 6.2*   ALKPHOS 57   ALT 6   AST 19   BILITOT 0.9   LIPASE 15     CE:  No results for input(s): Shira Height in the last 72 hours. PT/INR: No results for input(s): INR, APTT in the last 72 hours. BNP: No results for input(s): BNP in the last 72 hours. ESR: No results found for: SEDRATE  CRP: No results found for: CRP  D Dimer: No results found for: DDIMER   Folate and B12: No results found for: [id number], No results found for: FOLATE  Lactic Acid:   Lab Results   Component Value Date    LACTA 1.7 09/24/2021     Thyroid Studies: No results found for: TSH, Kallie Catalan    Oupatient labs:  Lab Results   Component Value Date    CHOL 160 03/16/2021    TRIG 69 03/16/2021    HDL 64 03/16/2021    LDLCALC 82 03/16/2021    INR 1.1 06/17/2021    LABA1C 4.8 09/14/2020       Urinalysis:    Lab Results   Component Value Date    NITRU Negative 08/23/2021    BLOODU Negative 08/23/2021    SPECGRAV 1.015 08/23/2021    GLUCOSEU Negative 08/23/2021       Imaging:  XR CHEST (2 VW)    Result Date: 9/24/2021  EXAMINATION: TWO XRAY VIEWS OF THE CHEST 9/24/2021 11:41 pm COMPARISON: 08/23/2021 HISTORY: ORDERING SYSTEM PROVIDED HISTORY: Fever, sepsis TECHNOLOGIST PROVIDED HISTORY: Reason for exam:->fever, sepsis What reading provider will be dictating this exam?->CRC FINDINGS: Right-sided Port-A-Cath remains in place.   Heart size is normal. Postoperative changes are present in the left mid chest with suture material. Superior to this is a large bleb or loculated pneumothorax appearing unchanged. Loculated pneumothorax favored. Minimal areas of scarring or atelectasis in the left mid lung. No significant change. Continued follow-up recommended. Please see report from recent chest CT dated 08/23/2021. CT ABDOMEN PELVIS W IV CONTRAST Additional Contrast? None    Result Date: 9/25/2021  EXAMINATION: CT OF THE ABDOMEN AND PELVIS WITH CONTRAST 9/25/2021 12:43 am TECHNIQUE: CT of the abdomen and pelvis was performed with the administration of intravenous contrast. Multiplanar reformatted images are provided for review. Dose modulation, iterative reconstruction, and/or weight based adjustment of the mA/kV was utilized to reduce the radiation dose to as low as reasonably achievable. COMPARISON: 08/23/2021 HISTORY: ORDERING SYSTEM PROVIDED HISTORY: abd pain TECHNOLOGIST PROVIDED HISTORY: Additional Contrast?->None Reason for exam:->abd pain Decision Support Exception - unselect if not a suspected or confirmed emergency medical condition->Emergency Medical Condition (MA) What reading provider will be dictating this exam?->CRC FINDINGS: Scattered tiny low-attenuation foci in the liver are unchanged, too small for definitive characterization but favor small cysts. Gallbladder is unremarkable. Spleen is near the upper limits of normal for size but unchanged. Pancreas is unremarkable. No adrenal mass. No hydronephrosis. Small bilateral extrarenal pelvis. Assessment of bowel is limited without oral contrast.  Small hiatal hernia. No bowel obstruction. Appendix is normal.  Mild diverticulosis without evidence of acute diverticulitis. Scattered vascular calcifications. Urinary bladder is unremarkable. Heterogeneous enlarged prostate gland. Calcifications in the pelvis are most consistent with phleboliths. No free fluid, free air or localized fluid collection. Please see separate dictated report for CT chest performed at the same time. Degenerative changes are present in the spine and pelvis. There is a faint area of sclerosis in the left iliac wing, best seen on series 4 image 149. This is slightly more conspicuous than previous and measures 8 mm. Mild diverticulosis. Heterogeneous enlarged prostate. Subtle area of sclerosis in the left iliac wing is more conspicuous than previous. Developing metastatic disease is not excluded and follow-up within 3 months is recommended. This could also be further assessed with MRI as indicated clinically. Other chronic appearing findings. CTA PULMONARY W CONTRAST    Result Date: 9/25/2021  EXAMINATION: CTA OF THE CHEST 9/24/2021 11:43 pm TECHNIQUE: CTA of the chest was performed after the administration of intravenous contrast.  Multiplanar reformatted images are provided for review. MIP images are provided for review. Dose modulation, iterative reconstruction, and/or weight based adjustment of the mA/kV was utilized to reduce the radiation dose to as low as reasonably achievable. COMPARISON: August 23 HISTORY: ORDERING SYSTEM PROVIDED HISTORY: fever, tachycardia, SOB TECHNOLOGIST PROVIDED HISTORY: Reason for exam:->fever, tachycardia, SOB Decision Support Exception - unselect if not a suspected or confirmed emergency medical condition->Emergency Medical Condition (MA) What reading provider will be dictating this exam?->CRC FINDINGS: Pulmonary Arteries: Pulmonary arteries are adequately opacified for evaluation. No evidence of intraluminal filling defect to suggest pulmonary embolism. Main pulmonary artery is normal in caliber. Mediastinum: MediPort line in the SVC. No aortic dissection or aneurysmal dilatation. Nonspecific mediastinal nodes. Lungs/pleura: Status post left upper lobectomy with large cavity in the left upper lung, similar to the prior examination.  Multifocal interstitial coarsening and pleuroparenchymal scarring in the left lower lung with surgical sutures, similar to the prior examination. Lobulated nodular mass measuring approximately 1.2 cm in diameter is unchanged since the prior examination. Pleural based densities likely pleuroparenchymal scarring in the lower lungs bilaterally, unchanged. There is no evidence of pleural effusions. No new areas of airspace consolidation. Upper Abdomen: Limited images of the upper abdomen are unremarkable. Soft Tissues/Bones: No acute bone or soft tissue abnormality. No evidence of pulmonary emboli. Status post left upper lobectomy with large cavity in the left upper lung similar to the prior examination. Multifocal interstitial coarsening and pleuroparenchymal scarring in the left lower lung with surgical sutures, similar to the prior examination. Stable lobulated nodular mass measuring approximately 1.2 cm in diameter in the area of the lingula. . Pleural based densities likely pleuroparenchymal scarring in the lower lungs bilaterally, unchanged. Nonspecific hilar and mediastinal nodes.        ASSESSMENT:  -Sepsis, unclear etiology  -Acute renal failure  -Generalized weakness  -Fever  -History of lung cancer  -COPD  -Hypertension  -Hyperlipidemia    PLAN:  -Admit to medicine  -Pharmacy to dose vancomycin  -Continue cefepime  -Monitor cultures  -Monitor renal function avoid nephrotoxic medication  -IV fluids normal saline at 75 mils per hour  -Continue home medications        Diet: No diet orders on file  Code Status: Prior  Surrogate decision maker confirmed with patient:   Extended Emergency Contact Information  Primary Emergency Contact: Hortensia Severs  Home Phone: [phone]  Relation: Child    DVT Prophylaxis: []Lovenox []Heparin []PCD [] [address] []Encouraged ambulation  Disposition: []Med/Surg [] Intermediate [] ICU/CCU  Admit status: [] Observation [] Inpatient     +++++++++++++++++++++++++++++++++++++++++++++++++  DO Adwoa Weathers Physician - [address], [place]  +++++++++++++++++++++++++++++++++++++++++++++++++  NOTE: This report was transcribed using voice recognition software. Every effort was made to ensure accuracy; however, inadvertent computerized transcription errors may be present.

## 2021-09-25 NOTE — PROGRESS NOTES
Pharmacy Consultation Note  (Antibiotic Dosing and Monitoring)    Initial consult date: 9/25/21  Consulting physician/provider: Isa Terrell  Drug: Vancomycin  Indication: sepsis of unknown etiology    Age/  Gender Height Weight IBW  Allergy Information   71 y.o./male 5' 9\" (175.3 cm) 147 lb (66.7 kg)     Ideal body weight: 70.7 kg (155 lb 13.8 oz)   Patient has no known allergies. Renal Function:  Recent Labs     09/24/21  2252   BUN 15   CREATININE 1.5*     No intake or output data in the 24 hours ending 09/25/21 1042    Vancomycin Monitoring:  Trough:  No results for input(s): VANCOTROUGH in the last 72 hours. Random:  No results for input(s): VANCORANDOM in the last 72 hours. Vancomycin Administration Times:  Recent vancomycin administrations                   vancomycin (VANCOCIN) 1,250 mg in dextrose 5 % 250 mL IVPB (mg) 1,250 mg New Bag 09/25/21 0336                Assessment:  · Patient is a 80 y.o. male who has been initiated on vancomycin  · Estimated Creatinine Clearance: 43 mL/min (A) (based on SCr of 1.5 mg/dL (H)).   · To dose vancomycin, pharmacy will be utilizing [employer] calculation software for goal AUC/CHARMAINE 400-600 mg/L-hr   · 9/25: WBC low at 2.8; patient currently afebrile; Scr elevated (1.5 mg/dL) from baseline of ~1.1 (mg/dL)    Plan:  · Will start vancomycin 1000 mg IV every 24 hours (est ; est trough 15.2 mg/L)  · Will check vancomycin levels when appropriate  · Will continue to monitor renal function   · Clinical pharmacy to follow      Chester Chandra PharmD  Pharmacy Resident  P: [id number]   9/25/2021 10:42 AM

## 2021-09-26 LAB
ACANTHOCYTES: ABNORMAL
ADENOVIRUS BY PCR: NOT DETECTED
ALBUMIN SERPL-MCNC: 3.7 G/DL (ref 3.5–5.2)
ALP BLD-CCNC: 48 U/L (ref 40–129)
ALT SERPL-CCNC: 26 U/L (ref 0–40)
ANION GAP SERPL CALCULATED.3IONS-SCNC: 9 MMOL/L (ref 7–16)
ANISOCYTOSIS: ABNORMAL
AST SERPL-CCNC: 47 U/L (ref 0–39)
BASOPHILS ABSOLUTE: 0.02 E9/L (ref 0–0.2)
BASOPHILS RELATIVE PERCENT: 0.9 % (ref 0–2)
BILIRUB SERPL-MCNC: 0.7 MG/DL (ref 0–1.2)
BORDETELLA PARAPERTUSSIS BY PCR: NOT DETECTED
BORDETELLA PERTUSSIS BY PCR: NOT DETECTED
BUN BLDV-MCNC: 13 MG/DL (ref 6–23)
CALCIUM SERPL-MCNC: 8.6 MG/DL (ref 8.6–10.2)
CHLAMYDOPHILIA PNEUMONIAE BY PCR: NOT DETECTED
CHLORIDE BLD-SCNC: 102 MMOL/L (ref 98–107)
CO2: 24 MMOL/L (ref 22–29)
CORONAVIRUS 229E BY PCR: NOT DETECTED
CORONAVIRUS HKU1 BY PCR: NOT DETECTED
CORONAVIRUS NL63 BY PCR: NOT DETECTED
CORONAVIRUS OC43 BY PCR: NOT DETECTED
CREAT SERPL-MCNC: 1.2 MG/DL (ref 0.7–1.2)
EOSINOPHILS ABSOLUTE: 0 E9/L (ref 0.05–0.5)
EOSINOPHILS RELATIVE PERCENT: 0 % (ref 0–6)
GFR AFRICAN AMERICAN: >60
GFR NON-AFRICAN AMERICAN: 60 ML/MIN/1.73
GLUCOSE BLD-MCNC: 122 MG/DL (ref 74–99)
HCT VFR BLD CALC: 31.5 % (ref 37–54)
HEMOGLOBIN: 9.9 G/DL (ref 12.5–16.5)
HUMAN METAPNEUMOVIRUS BY PCR: NOT DETECTED
HUMAN RHINOVIRUS/ENTEROVIRUS BY PCR: NOT DETECTED
INFLUENZA A BY PCR: NOT DETECTED
INFLUENZA B BY PCR: NOT DETECTED
LYMPHOCYTES ABSOLUTE: 0.18 E9/L (ref 1.5–4)
LYMPHOCYTES RELATIVE PERCENT: 7.8 % (ref 20–42)
MAGNESIUM: 1.8 MG/DL (ref 1.6–2.6)
MCH RBC QN AUTO: 30.7 PG (ref 26–35)
MCHC RBC AUTO-ENTMCNC: 31.4 % (ref 32–34.5)
MCV RBC AUTO: 97.5 FL (ref 80–99.9)
MONOCYTES ABSOLUTE: 0.09 E9/L (ref 0.1–0.95)
MONOCYTES RELATIVE PERCENT: 4.3 % (ref 2–12)
MYCOPLASMA PNEUMONIAE BY PCR: NOT DETECTED
NEUTROPHILS ABSOLUTE: 1.91 E9/L (ref 1.8–7.3)
NEUTROPHILS RELATIVE PERCENT: 87 % (ref 43–80)
OVALOCYTES: ABNORMAL
PARAINFLUENZA VIRUS 1 BY PCR: NOT DETECTED
PARAINFLUENZA VIRUS 2 BY PCR: NOT DETECTED
PARAINFLUENZA VIRUS 3 BY PCR: NOT DETECTED
PARAINFLUENZA VIRUS 4 BY PCR: NOT DETECTED
PDW BLD-RTO: 18.7 FL (ref 11.5–15)
PLATELET # BLD: 111 E9/L (ref 130–450)
PMV BLD AUTO: 10.1 FL (ref 7–12)
POIKILOCYTES: ABNORMAL
POLYCHROMASIA: ABNORMAL
POTASSIUM REFLEX MAGNESIUM: 3.5 MMOL/L (ref 3.5–5)
PROCALCITONIN: 4.56 NG/ML (ref 0–0.08)
RBC # BLD: 3.23 E12/L (ref 3.8–5.8)
RESPIRATORY SYNCYTIAL VIRUS BY PCR: NOT DETECTED
SARS-COV-2, PCR: NOT DETECTED
SODIUM BLD-SCNC: 135 MMOL/L (ref 132–146)
TEAR DROP CELLS: ABNORMAL
TOTAL PROTEIN: 5.9 G/DL (ref 6.4–8.3)
TOXIC GRANULATION: ABNORMAL
WBC # BLD: 2.2 E9/L (ref 4.5–11.5)

## 2021-09-26 PROCEDURE — 0202U NFCT DS 22 TRGT SARS-COV-2: CPT

## 2021-09-26 PROCEDURE — 87449 NOS EACH ORGANISM AG IA: CPT

## 2021-09-26 PROCEDURE — 87493 C DIFF AMPLIFIED PROBE: CPT

## 2021-09-26 PROCEDURE — 6360000002 HC RX W HCPCS: Performed by: FAMILY MEDICINE

## 2021-09-26 PROCEDURE — 6360000002 HC RX W HCPCS

## 2021-09-26 PROCEDURE — 6370000000 HC RX 637 (ALT 250 FOR IP): Performed by: FAMILY MEDICINE

## 2021-09-26 PROCEDURE — 36415 COLL VENOUS BLD VENIPUNCTURE: CPT

## 2021-09-26 PROCEDURE — 94760 N-INVAS EAR/PLS OXIMETRY 1: CPT

## 2021-09-26 PROCEDURE — 83735 ASSAY OF MAGNESIUM: CPT

## 2021-09-26 PROCEDURE — 85025 COMPLETE CBC W/AUTO DIFF WBC: CPT

## 2021-09-26 PROCEDURE — 2140000000 HC CCU INTERMEDIATE R&B

## 2021-09-26 PROCEDURE — 2580000003 HC RX 258

## 2021-09-26 PROCEDURE — 80053 COMPREHEN METABOLIC PANEL: CPT

## 2021-09-26 PROCEDURE — 87324 CLOSTRIDIUM AG IA: CPT

## 2021-09-26 PROCEDURE — 87088 URINE BACTERIA CULTURE: CPT

## 2021-09-26 PROCEDURE — 2580000003 HC RX 258: Performed by: FAMILY MEDICINE

## 2021-09-26 PROCEDURE — 84145 PROCALCITONIN (PCT): CPT

## 2021-09-26 PROCEDURE — 94640 AIRWAY INHALATION TREATMENT: CPT

## 2021-09-26 RX ADMIN — ARFORMOTEROL TARTRATE 15 MCG: 15 SOLUTION RESPIRATORY (INHALATION) at 09:15

## 2021-09-26 RX ADMIN — SUCRALFATE 1 G: 1 TABLET ORAL at 16:36

## 2021-09-26 RX ADMIN — ACETAMINOPHEN 650 MG: 325 TABLET ORAL at 17:31

## 2021-09-26 RX ADMIN — VANCOMYCIN HYDROCHLORIDE 1250 MG: 10 INJECTION, POWDER, LYOPHILIZED, FOR SOLUTION INTRAVENOUS at 17:31

## 2021-09-26 RX ADMIN — SUCRALFATE 1 G: 1 TABLET ORAL at 11:56

## 2021-09-26 RX ADMIN — APIXABAN 2.5 MG: 2.5 TABLET, FILM COATED ORAL at 20:57

## 2021-09-26 RX ADMIN — IPRATROPIUM BROMIDE 0.5 MG: 0.5 SOLUTION RESPIRATORY (INHALATION) at 09:16

## 2021-09-26 RX ADMIN — ACETAMINOPHEN 650 MG: 325 TABLET ORAL at 05:10

## 2021-09-26 RX ADMIN — IPRATROPIUM BROMIDE 0.5 MG: 0.5 SOLUTION RESPIRATORY (INHALATION) at 20:16

## 2021-09-26 RX ADMIN — APIXABAN 2.5 MG: 2.5 TABLET, FILM COATED ORAL at 08:43

## 2021-09-26 RX ADMIN — LOSARTAN POTASSIUM 100 MG: 50 TABLET, FILM COATED ORAL at 08:42

## 2021-09-26 RX ADMIN — SUCRALFATE 1 G: 1 TABLET ORAL at 00:07

## 2021-09-26 RX ADMIN — FERROUS SULFATE TAB 325 MG (65 MG ELEMENTAL FE) 325 MG: 325 (65 FE) TAB at 08:42

## 2021-09-26 RX ADMIN — SUCRALFATE 1 G: 1 TABLET ORAL at 05:10

## 2021-09-26 RX ADMIN — CEFEPIME HYDROCHLORIDE 2000 MG: 2 INJECTION, POWDER, FOR SOLUTION INTRAVENOUS at 02:20

## 2021-09-26 RX ADMIN — FERROUS SULFATE TAB 325 MG (65 MG ELEMENTAL FE) 325 MG: 325 (65 FE) TAB at 16:36

## 2021-09-26 RX ADMIN — ARFORMOTEROL TARTRATE 15 MCG: 15 SOLUTION RESPIRATORY (INHALATION) at 20:16

## 2021-09-26 RX ADMIN — SODIUM CHLORIDE 75 ML/HR: 9 INJECTION, SOLUTION INTRAVENOUS at 00:12

## 2021-09-26 RX ADMIN — BUDESONIDE 250 MCG: 0.25 SUSPENSION RESPIRATORY (INHALATION) at 09:16

## 2021-09-26 RX ADMIN — AMIODARONE HYDROCHLORIDE 100 MG: 200 TABLET ORAL at 08:43

## 2021-09-26 RX ADMIN — ATORVASTATIN CALCIUM 10 MG: 10 TABLET, FILM COATED ORAL at 08:43

## 2021-09-26 RX ADMIN — BUDESONIDE 250 MCG: 0.25 SUSPENSION RESPIRATORY (INHALATION) at 20:16

## 2021-09-26 RX ADMIN — CEFEPIME HYDROCHLORIDE 2000 MG: 2 INJECTION, POWDER, FOR SOLUTION INTRAVENOUS at 13:41

## 2021-09-26 ASSESSMENT — PAIN SCALES - GENERAL
PAINLEVEL_OUTOF10: 0
PAINLEVEL_OUTOF10: 6
PAINLEVEL_OUTOF10: 10

## 2021-09-26 NOTE — HOME CARE
Patient current with [facility] for SN. Will need DORYS orders if appropriate at discharge. Dashawn Gomez LPN  [facility].

## 2021-09-26 NOTE — PATIENT CARE CONFERENCE
P Quality Flow/Interdisciplinary Rounds Progress Note        Quality Flow Rounds held on September 26, 2021    Disciplines Attending:  Bedside Nurse and Nursing Unit Leadership    Yara Marx was admitted on 9/24/2021 10:47 PM    Anticipated Discharge Date:       Disposition:    Timothy Score:  Timothy Scale Score: 16    Readmission Risk              Risk of Unplanned Readmission:  17           Discussed patient goal for the day, patient clinical progression, and barriers to discharge.   The following Goal(s) of the Day/Commitment(s) have been identified:  be fever free      Jorgito Painting RN  September 26, 2021

## 2021-09-26 NOTE — PLAN OF CARE
Problem: Falls - Risk of:  Goal: Will remain free from falls  Description: Will remain free from falls  9/25/2021 2214 by Loi Hein RN  Outcome: Met This Shift  9/25/2021 1629 by Quoc Vora RN  Outcome: Met This Shift  Goal: Absence of physical injury  Description: Absence of physical injury  Outcome: Met This Shift     Problem: Skin Integrity:  Goal: Will show no infection signs and symptoms  Description: Will show no infection signs and symptoms  9/25/2021 2214 by Loi Hein RN  Outcome: Met This Shift  9/25/2021 1629 by Quoc Vora RN  Outcome: Met This Shift  Goal: Absence of new skin breakdown  Description: Absence of new skin breakdown  Outcome: Met This Shift

## 2021-09-26 NOTE — PROGRESS NOTES
Consult called to [facility](Dr. Gilbert Gonzalez on call) answering service,awaiting for call back or orders.

## 2021-09-26 NOTE — CONSULTS
[facility] CONSULT NOTE    Reason for Consult: Fever  Requested by: Tunde Arreola MD     Chief complaint: Hypotension    History Obtained From: Patient and EMR    HISTORY Lionel              The patient is a 80 y.o. male with history of hypertension, hyperlipidemia, COPD, left upper lobe lung adenocarcinoma diagnosed in 04/2020 s/p left upper lobectomy and left lower lobe wedge resection in 46/8657 complicated by recurrent disease on chemoradiation therapy with carboplatin and paclitaxel from 07/2021-09/2021, presented on 09/24 with hypotension, accompanied by increasing fatigue and weakness for 2 days later noted to be lethargic then found to be hypotensive. He reports having liquid stools since admission. On admission, he had intermittent low-grade fever up to 100.8 °F with leukocytopenia of 2200 (present since 08/2021), elevated procalcitonin of 4.56 ng/mL. SARS-CoV-2 PCR was not detected. Blood cultures showed no growth to date. Chest CTA showed status post left upper lobectomy with large cavity in the left upper lung unchanged from that in 08/2021, multifocal interstitial coarsening and in the lower lungs bilaterally in the left lower lung with surgical sutures unchanged from that in 08/2021, stable lobulated nodular mass approximately 1.2 cm in the area of the lingula, pleural-based densities likely pleuroparenchymal scarring in the lower lungs bilaterally unchanged from that in 08/2021, nonspecific hilar and mediastinal nodes, Mediport in situ. CT abdomen and pelvis showed mild diverticulosis, heterogenous and enlarged prostate, subtle area of sclerosis in the left iliac wing probably developing metastatic disease. Cefepime and vancomycin were started on admission. ID service was subsequently consulted for further recommendations.     Past Medical History  Past Medical History:   Diagnosis Date    Hyperlipidemia     Hypertension     Lung cancer ([facility] [address]        Current Facility-Administered Medications   Medication Dose Route Frequency Provider Last Rate Last Admin    vancomycin (VANCOCIN) 1,250 mg in dextrose 5 % 250 mL IVPB  1,250 mg IntraVENous Q24H Jennyfer Polverine, RPH        albuterol (ACCUNEB) nebulizer solution 0.63 mg  1 ampule Nebulization Q6H PRN Lurena Mitchell, DO        amiodarone (CORDARONE) tablet 100 mg  100 mg Oral Daily Lurena Mitchell, DO   100 mg at 09/26/21 6463    apixaban (ELIQUIS) tablet 2.5 mg  2.5 mg Oral BID Lurena Mitchell, DO   2.5 mg at 09/26/21 0843    atorvastatin (LIPITOR) tablet 10 mg  10 mg Oral Daily Lurena Mitchell, DO   10 mg at 09/26/21 6612    losartan (COZAAR) tablet 100 mg  100 mg Oral Daily Lurena Mitchell, DO   100 mg at 09/26/21 1693    sucralfate (CARAFATE) tablet 1 g  1 g Oral 4 times per day Lurena Mitchell, DO   1 g at 09/26/21 1156    sodium chloride flush 0.9 % injection 10 mL  10 mL IntraVENous 2 times per day Lurena Mitchell, DO        sodium chloride flush 0.9 % injection 10 mL  10 mL IntraVENous PRN Lurena Mithcell, DO        0.9 % sodium chloride infusion  25 mL IntraVENous PRN Lurena Mitchell, DO        promethazine (PHENERGAN) tablet 12.5 mg  12.5 mg Oral Q6H PRN Lurena Mitchell, DO        Or    [facility] injection 4 mg  4 mg IntraVENous Q6H PRN Lurena Mitchell, DO        polyethylene glycol (GLYCOLAX) packet 17 g  17 g Oral Daily PRN Lurena Mitchell, DO        acetaminophen (TYLENOL) tablet 650 mg  650 mg Oral Q6H PRN Lurena Mitchell, DO   650 mg at 09/26/21 0510    Or    acetaminophen (TYLENOL) suppository 650 mg  650 mg Rectal Q6H PRN Lurena Mitchell, DO        cefepime (MAXIPIME) 2000 mg IVPB minibag  2,000 mg IntraVENous Q12H Lurena Mitchell, DO   Stopped at 09/26/21 0254    0.9 % sodium chloride infusion   IntraVENous Continuous Lurena Mitchell, DO 75 mL/hr at 09/26/21 0012 75 mL/hr at 09/26/21 0012    ferrous sulfate (IRON 325) tablet 325 mg  325 mg Oral BID  Tarik Mitchell DO   325 mg at 09/26/21 0842    budesonide (PULMICORT) nebulizer suspension 250 mcg  0.25 mg Nebulization BID Oliver Bare, DO   250 mcg at 21 8460    And    ipratropium (ATROVENT) 0.02 % nebulizer solution 0.5 mg  0.5 mg Nebulization 4x daily Oliver Bare, DO   0.5 mg at 21 4800    And    Arformoterol Tartrate (BROVANA) nebulizer solution 15 mcg  15 mcg Nebulization BID Oliver Bare, DO   15 mcg at 21 0915    0.9 % sodium chloride infusion admixture   IntraVENous Q12H Oliver Bare, DO           No Known Allergies    Surgical History  Past Surgical History:   Procedure Laterality Date    CT NEEDLE BIOPSY LUNG PERCUTANEOUS  2021    CT NEEDLE BIOPSY LUNG PERCUTANEOUS 2021 Dorian Chaudhry MD [facility]    LUNG CANCER SURGERY      PORT SURGERY Right 2021        Social History  Social History     Socioeconomic History    Marital status: Single    Number of children: 3   Occupational History    Occupation: retired- construction/ painting   Tobacco Use    Smoking status: Former Smoker     Packs/day: 1.00     Years: 55.00     Pack years: 55.00     Types: Cigarettes     Start date:      Quit date: 2020     Years since quittin.1    Smokeless tobacco: Never Used   Vaping Use    Vaping Use: Never used    Passive vaping exposure Yes   Substance and Sexual Activity    Alcohol use: Not Currently    Drug use: Never         Family Medical History  Family History   Problem Relation Age of Onset    Cancer Brother         bone       Review of Systems:  Constitutional: No fever, had chills  Eyes: No vision changes, no retroorbital pain  ENT: No hearing changes, no ear pain  Respiratory: No cough, no dyspnea  Cardiovascular: No chest pain, no palpitations  Gastrointestinal: No abdominal pain, has diarrhea  Genitourinary: No dysuria, no hematuria  Integumentary: No rash, no itching  Musculoskeletal: No muscle pain, no joint pain  Neurologic: No headache, no numbness in extremities    Physical Examination:  Vitals:    21 0400 21 0510 09/26/21 0657 09/26/21 0916   BP: (!) 110/53  (!) 122/56    Pulse: 78  105    Resp: 18  20 16   Temp: 97.4 °F (36.3 °C) 97.7 °F (36.5 °C) 99.7 °F (37.6 °C)    TempSrc: Temporal Oral Temporal    SpO2: 97%  97% 95%   Weight:       Height:         Constitutional: Alert, not in distress  Eyes: Sclerae anicteric, no conjunctival erythema  ENT: No buccal lesion, no pharyngeal exudates  Neck: No nuchal rigidity, no cervical adenopathy  Lungs: Clear breath sounds, no crackles, no wheezes  Heart: Regular rate and rhythm, no murmurs  Abdomen: Bowel sounds present, soft, nontender  Skin: Warm and dry, no active dermatoses. Right chest wall venous access port with no surrounding erythema and tenderness. Musculoskeletal: No joint erythema, no joint swelling    Labs, imaging, and medical records/notes were personally reviewed. Assessment:  SIRS, suspect sepsis, etiology unclear  Immunocompromised state  Left upper lobe lung adenocarcinoma diagnosed in 04/2020 s/p left upper lobectomy and left lower lobe wedge resection in 62/6667 complicated by recurrent disease on chemoradiation therapy with carboplatin and paclitaxel     Plan:  Continue cefepime at 2 g every 8 hours and vancomycin dosed according to level per pharmacy for now pending blood cultures. Check urine Streptococcus pneumoniae and Legionella antigens. Check respiratory pathogen PCR panel. Follow-up stool C. difficile toxin assay. Follow-up blood cultures. Continue supportive care. Thank you for involving me in the care of Camryn Wagner. I will continue to follow. Please do not hesitate to call for any questions or concerns.     Electronically signed by Staci Blancas MD on 9/26/2021 at 12:30 PM

## 2021-09-26 NOTE — PROGRESS NOTES
Pharmacy Consultation Note  (Antibiotic Dosing and Monitoring)    Initial consult date: 9/25/21  Consulting physician/provider: Glorious Small  Drug: Vancomycin  Indication: sepsis of unknown etiology    Age/  Gender Height Weight IBW  Allergy Information   71 y.o./male 5' 9\" (175.3 cm) 147 lb (66.7 kg)     Ideal body weight: 70.7 kg (155 lb 13.8 oz)   Patient has no known allergies. Renal Function:  Recent Labs     09/24/21  2252 09/26/21  0502   BUN 15 13   CREATININE 1.5* 1.2       Intake/Output Summary (Last 24 hours) at 9/26/2021 0752  Last data filed at 9/26/2021 0011  Gross per 24 hour   Intake 120 ml   Output 150 ml   Net -30 ml       Vancomycin Monitoring:  Trough:  No results for input(s): VANCOTROUGH in the last 72 hours. Random:  No results for input(s): VANCORANDOM in the last 72 hours. Vancomycin Administration Times:  Recent vancomycin administrations                   vancomycin 1000 mg IVPB in 250 mL D5W addavial (mg) 1,000 mg New Bag 09/25/21 1735    vancomycin (VANCOCIN) 1,250 mg in dextrose 5 % 250 mL IVPB (mg) 1,250 mg New Bag 09/25/21 0336                Assessment:  · Patient is a 80 y.o. male who has been initiated on vancomycin  · Estimated Creatinine Clearance: 54 mL/min (based on SCr of 1.2 mg/dL).   · To dose vancomycin, pharmacy will be utilizing [employer] calculation software for goal AUC/CHARMAINE 400-600 mg/L-hr   · 9/25: WBC low at 2.8; patient currently afebrile; Scr elevated (1.5 mg/dL) from baseline of ~1.1 (mg/dL)  · 9/26: afebrile; Scr decreased from 1.5 yesterday to 1.2 today    Plan:  · Will change to vancomycin 1250 mg IV every 24 hours (est ; est trough 17.5 mg/L)  · Will check vancomycin level tomorrow morning  · Will continue to monitor renal function   · Clinical pharmacy to follow      Kate Antonio, PharmD  Pharmacy Resident  P: [id number]   9/26/2021 7:52 AM

## 2021-09-26 NOTE — PROGRESS NOTES
Hospitalist Progress Note      SYNOPSIS: Patient admitted on 2021 for Hypotension    Patient presented to the emergency department with fatigue, weakness and low blood pressure. This is been worsening over the last couple of days. Family states that this morning the patient was lethargic. They checked his blood pressure and it was 90/40. Patient also reports some mild abdominal pain. Vital signs were assessed on arrival and he had a temperature of 100.7, heart rate of 114 and a blood pressure of 91/42. Laboratory studies reveal creatinine 1.5, hemoglobin 9.0. COVID-19 negative. CT chest and abdomen pelvis showed no acute findings. Emergency department personnel empirically cover the patient with vancomycin and cefepime. SUBJECTIVE:    Patient seen and examined  Records reviewed. The pt denies any pain. He states that he is having fevers. He underwent radiation a couple of weeks ago for his lung cancer. He denies any burning on urination and denies any abdominal pain but reports some diarrhea. Temp (24hrs), Av.7 °F (37.1 °C), Min:97.4 °F (36.3 °C), Max:100.8 °F (38.2 °C)    DIET: ADULT DIET; Regular  CODE: Full Code    Intake/Output Summary (Last 24 hours) at 2021 1110  Last data filed at 2021 0845  Gross per 24 hour   Intake 2123.73 ml   Output 150 ml   Net 1973.73 ml       OBJECTIVE:    BP (!) 122/56   Pulse 105   Temp 99.7 °F (37.6 °C) (Temporal)   Resp 16   Ht 5' 9\" (1.753 m)   Wt 147 lb (66.7 kg)   SpO2 95%   BMI 21.71 kg/m²     General appearance: No apparent distress, appears stated age and cooperative. HEENT:  Conjunctivae/corneas clear. Neck: Supple. No jugular venous distention. Respiratory: Diminished. Some Ronchi  Cardiovascular: Regular rate rhythm, normal S1-S2  Abdomen: Soft, nontender, nondistended  Musculoskeletal: No clubbing, cyanosis, no bilateral lower extremity edema. Brisk capillary refill.    Skin:  No rashes  on visible skin  Neurologic: AST 19 47*   BILITOT 0.9 0.7   LIPASE 15  --        No results for input(s): INR in the last 72 hours. No results for input(s): Patrisha Meigs in the last 72 hours. Chronic labs:    Lab Results   Component Value Date    CHOL 160 03/16/2021    TRIG 69 03/16/2021    HDL 64 03/16/2021    LDLCALC 82 03/16/2021    INR 1.1 06/17/2021    LABA1C 4.8 09/14/2020       Radiology: REVIEWED DAILY    +++++++++++++++++++++++++++++++++++++++++++++++++  Connor Barth MD  [facility] - [address] [place], [place]  +++++++++++++++++++++++++++++++++++++++++++++++++  NOTE: This report was transcribed using voice recognition software. Every effort was made to ensure accuracy; however, inadvertent computerized transcription errors may be present.

## 2021-09-26 NOTE — PROGRESS NOTES
Progress Note    Subjective:  Patient admitted with weakness, fatigue, fevers and hypotension. He denies any headaches. He denies any cough or congestion. No mouth sores. No heartburn or nausea. He denies any dysuria. Mild loose stools. No abdominal pain. Objective:    BP (!) 122/56   Pulse 105   Temp 99.7 °F (37.6 °C) (Temporal)   Resp 16   Ht 5' 9\" (1.753 m)   Wt 147 lb (66.7 kg)   SpO2 95%   BMI 21.71 kg/m²     General: Pleasant gentleman awake alert oriented x3. No apparent distress  HEENT: PERRLA, anicteric sclera, oropharynx is clear, no mucositis or thrush. Heart:  RRR, no murmurs, gallops, or rubs. Lungs:  CTA bilaterally, no wheeze, rales or rhonchi  Abd: bowel sounds present, nontender, nondistended, no masses  Extrem:  No clubbing, cyanosis, or edema  Right anterior chest wall Mediport site intact. CBC:   Lab Results   Component Value Date    WBC 2.2 09/26/2021    RBC 3.23 09/26/2021    HGB 9.9 09/26/2021    HCT 31.5 09/26/2021    MCV 97.5 09/26/2021    MCH 30.7 09/26/2021    MCHC 31.4 09/26/2021    RDW 18.7 09/26/2021     09/26/2021    MPV 10.1 09/26/2021     CMP:    Lab Results   Component Value Date     09/26/2021    K 3.5 09/26/2021     09/26/2021    CO2 24 09/26/2021    BUN 13 09/26/2021    CREATININE 1.2 09/26/2021    GFRAA >60 09/26/2021    AGRATIO 1.4 08/22/2021    LABGLOM 60 09/26/2021    GLUCOSE 122 09/26/2021    PROT 5.9 09/26/2021    LABALBU 3.7 09/26/2021    CALCIUM 8.6 09/26/2021    BILITOT 0.7 09/26/2021    ALKPHOS 48 09/26/2021    AST 47 09/26/2021    ALT 26 09/26/2021            CT ABDOMEN PELVIS W IV CONTRAST Additional Contrast? None   Final Result   Mild diverticulosis. Heterogeneous enlarged prostate. Subtle area of sclerosis in the left iliac wing is more conspicuous than   previous. Developing metastatic disease is not excluded and follow-up within   3 months is recommended.   This could also be further assessed with MRI as indicated clinically. Other chronic appearing findings. CTA PULMONARY W CONTRAST   Final Result   No evidence of pulmonary emboli. Status post left upper lobectomy with large cavity in the left upper lung   similar to the prior examination. Multifocal interstitial coarsening and pleuroparenchymal scarring in the left   lower lung with surgical sutures, similar to the prior examination. Stable lobulated nodular mass measuring approximately 1.2 cm in diameter in   the area of the lingula. .      Pleural based densities likely pleuroparenchymal scarring in the lower lungs   bilaterally, unchanged. Nonspecific hilar and mediastinal nodes. XR CHEST (2 VW)   Final Result   No significant change. Continued follow-up recommended. Please see report   from recent chest CT dated 08/23/2021.                Current Facility-Administered Medications:     vancomycin (VANCOCIN) 1,250 mg in dextrose 5 % 250 mL IVPB, 1,250 mg, IntraVENous, Q24H, Jennyfer Polverine, RPH    albuterol (ACCUNEB) nebulizer solution 0.63 mg, 1 ampule, Nebulization, Q6H PRN, Rodney Gey, DO    amiodarone (CORDARONE) tablet 100 mg, 100 mg, Oral, Daily, Rodney Gey, DO, 100 mg at 09/26/21 5700    apixaban (ELIQUIS) tablet 2.5 mg, 2.5 mg, Oral, BID, Rodney Gey, DO, 2.5 mg at 09/26/21 0843    atorvastatin (LIPITOR) tablet 10 mg, 10 mg, Oral, Daily, Rodney Gey, DO, 10 mg at 09/26/21 0843    losartan (COZAAR) tablet 100 mg, 100 mg, Oral, Daily, Rodney Gey, DO, 100 mg at 09/26/21 2919    sucralfate (CARAFATE) tablet 1 g, 1 g, Oral, 4 times per day, Rodney Gey, DO, 1 g at 09/26/21 1156    sodium chloride flush 0.9 % injection 10 mL, 10 mL, IntraVENous, 2 times per day, Rodney Gey, DO    sodium chloride flush 0.9 % injection 10 mL, 10 mL, IntraVENous, PRN, Rodney Gey, DO    0.9 % sodium chloride infusion, 25 mL, IntraVENous, PRN, Rodney Gey, DO    promethazine (PHENERGAN) tablet 12.5 mg, 12.5 mg, Oral, Q6H PRN **OR** ondansetron (ZOFRAN) injection 4 mg, 4 mg, IntraVENous, Q6H PRN, Rodney Butty, DO    polyethylene glycol (GLYCOLAX) packet 17 g, 17 g, Oral, Daily PRN, Rodney Gey, DO    acetaminophen (TYLENOL) tablet 650 mg, 650 mg, Oral, Q6H PRN, 650 mg at 09/26/21 0510 **OR** acetaminophen (TYLENOL) suppository 650 mg, 650 mg, Rectal, Q6H PRN, Rodney Gey, DO    cefepime (MAXIPIME) 2000 mg IVPB minibag, 2,000 mg, IntraVENous, Q12H, Rodney Viramontes, DO, Stopped at 09/26/21 1430    0.9 % sodium chloride infusion, , IntraVENous, Continuous, Rodney Viramontes, DO, Last Rate: 75 mL/hr at 09/26/21 0012, 75 mL/hr at 09/26/21 0012    ferrous sulfate (IRON 325) tablet 325 mg, 325 mg, Oral, BID WC, Rodneymckayla Viramontes, DO, 325 mg at 09/26/21 0842    budesonide (PULMICORT) nebulizer suspension 250 mcg, 0.25 mg, Nebulization, BID, 250 mcg at 09/26/21 0916 **AND** ipratropium (ATROVENT) 0.02 % nebulizer solution 0.5 mg, 0.5 mg, Nebulization, 4x daily, 0.5 mg at 09/26/21 0916 **AND** Arformoterol Tartrate (BROVANA) nebulizer solution 15 mcg, 15 mcg, Nebulization, BID, Rodneymckayla Butty, DO, 15 mcg at 09/26/21 0915    0.9 % sodium chloride infusion admixture, , IntraVENous, Q12H, Rodney Butty, DO    Assessment:    Active Problems:    Sepsis ([address]  Resolved Problems:    * No resolved hospital problems. *      80 y.o. male with history of hypertension, hyperlipidemia, COPD, left upper lobe lung adenocarcinoma diagnosed in 04/2020 s/p left upper lobectomy and left lower lobe wedge resection in 41/7089 complicated by recurrent disease on chemoradiation therapy with carboplatin and paclitaxel from 07/2021-09/2021, presented on 09/24 with hypotension, accompanied by increasing fatigue and weakness for 2 days later noted to be lethargic then found to be hypotensive.     Chest CTA showed status post left upper lobectomy with large cavity in the left upper lung unchanged from that in 08/2021, multifocal interstitial coarsening and in the lower lungs bilaterally in the left lower lung with surgical sutures unchanged from that in 08/2021, stable lobulated nodular mass approximately 1.2 cm in the area of the lingula, pleural-based densities likely pleuroparenchymal scarring in the lower lungs bilaterally unchanged from that in 08/2021, nonspecific hilar and mediastinal nodes. CT abdomen and pelvis showed mild diverticulosis, heterogenous and enlarged prostate, subtle area of sclerosis in the left iliac wing probably developing metastatic disease. Plan:  -Continue antibiotic coverage as per ID. Currently on vancomycin and cefepime. Had an elevated procalcitonin level. Follow-up on the cultures.  -Continue on Eliquis and Cordarone for history of atrial fibrillation.  -Concern on recent CT abdomen of small subtle area of sclerosis left iliac wing previously 4 mm now by 8 mm. Could be possibly developing metastatic disease. Recommend MRI of the pelvis once he is stable. -We will continue following with you.       Electronically signed by Lisette Yuen MD on 9/26/2021 at 2:55 PM

## 2021-09-27 LAB
ALBUMIN SERPL-MCNC: 3.1 G/DL (ref 3.5–5.2)
ALP BLD-CCNC: 43 U/L (ref 40–129)
ALT SERPL-CCNC: 18 U/L (ref 0–40)
ANION GAP SERPL CALCULATED.3IONS-SCNC: 12 MMOL/L (ref 7–16)
ANISOCYTOSIS: ABNORMAL
AST SERPL-CCNC: 33 U/L (ref 0–39)
BASOPHILS ABSOLUTE: 0 E9/L (ref 0–0.2)
BASOPHILS RELATIVE PERCENT: 0 % (ref 0–2)
BILIRUB SERPL-MCNC: 0.3 MG/DL (ref 0–1.2)
BUN BLDV-MCNC: 12 MG/DL (ref 6–23)
C DIFF TOXIN/ANTIGEN: NORMAL
C. DIFFICILE TOXIN MOLECULAR: NORMAL
CALCIUM SERPL-MCNC: 8 MG/DL (ref 8.6–10.2)
CHLORIDE BLD-SCNC: 108 MMOL/L (ref 98–107)
CO2: 18 MMOL/L (ref 22–29)
CREAT SERPL-MCNC: 1 MG/DL (ref 0.7–1.2)
EOSINOPHILS ABSOLUTE: 0.02 E9/L (ref 0.05–0.5)
EOSINOPHILS RELATIVE PERCENT: 1.3 % (ref 0–6)
GFR AFRICAN AMERICAN: >60
GFR NON-AFRICAN AMERICAN: >60 ML/MIN/1.73
GLUCOSE BLD-MCNC: 144 MG/DL (ref 74–99)
HCT VFR BLD CALC: 23.8 % (ref 37–54)
HEMOGLOBIN: 7.7 G/DL (ref 12.5–16.5)
IMMATURE GRANULOCYTES #: 0.01 E9/L
IMMATURE GRANULOCYTES %: 0.6 % (ref 0–5)
L. PNEUMOPHILA SEROGP 1 UR AG: NORMAL
LYMPHOCYTES ABSOLUTE: 0.18 E9/L (ref 1.5–4)
LYMPHOCYTES RELATIVE PERCENT: 11.7 % (ref 20–42)
MAGNESIUM: 1.8 MG/DL (ref 1.6–2.6)
MCH RBC QN AUTO: 31 PG (ref 26–35)
MCHC RBC AUTO-ENTMCNC: 32.4 % (ref 32–34.5)
MCV RBC AUTO: 96 FL (ref 80–99.9)
MONOCYTES ABSOLUTE: 0.25 E9/L (ref 0.1–0.95)
MONOCYTES RELATIVE PERCENT: 16.2 % (ref 2–12)
NEUTROPHILS ABSOLUTE: 1.08 E9/L (ref 1.8–7.3)
NEUTROPHILS RELATIVE PERCENT: 70.2 % (ref 43–80)
OVALOCYTES: ABNORMAL
PDW BLD-RTO: 18.1 FL (ref 11.5–15)
PLATELET # BLD: 82 E9/L (ref 130–450)
PLATELET CONFIRMATION: NORMAL
PMV BLD AUTO: 10.6 FL (ref 7–12)
POIKILOCYTES: ABNORMAL
POLYCHROMASIA: ABNORMAL
POTASSIUM REFLEX MAGNESIUM: 3.2 MMOL/L (ref 3.5–5)
PROCALCITONIN: 2.6 NG/ML (ref 0–0.08)
RBC # BLD: 2.48 E12/L (ref 3.8–5.8)
SODIUM BLD-SCNC: 138 MMOL/L (ref 132–146)
STREP PNEUMONIAE ANTIGEN, URINE: NORMAL
TEAR DROP CELLS: ABNORMAL
TOTAL PROTEIN: 5.4 G/DL (ref 6.4–8.3)
VANCOMYCIN RANDOM: 14.1 MCG/ML (ref 5–40)
WBC # BLD: 1.5 E9/L (ref 4.5–11.5)

## 2021-09-27 PROCEDURE — 84145 PROCALCITONIN (PCT): CPT

## 2021-09-27 PROCEDURE — 2580000003 HC RX 258: Performed by: INTERNAL MEDICINE

## 2021-09-27 PROCEDURE — 85025 COMPLETE CBC W/AUTO DIFF WBC: CPT

## 2021-09-27 PROCEDURE — 80202 ASSAY OF VANCOMYCIN: CPT

## 2021-09-27 PROCEDURE — 36415 COLL VENOUS BLD VENIPUNCTURE: CPT

## 2021-09-27 PROCEDURE — 6360000002 HC RX W HCPCS: Performed by: INTERNAL MEDICINE

## 2021-09-27 PROCEDURE — 80053 COMPREHEN METABOLIC PANEL: CPT

## 2021-09-27 PROCEDURE — 2580000003 HC RX 258: Performed by: FAMILY MEDICINE

## 2021-09-27 PROCEDURE — 94640 AIRWAY INHALATION TREATMENT: CPT

## 2021-09-27 PROCEDURE — 2140000000 HC CCU INTERMEDIATE R&B

## 2021-09-27 PROCEDURE — 6360000002 HC RX W HCPCS: Performed by: FAMILY MEDICINE

## 2021-09-27 PROCEDURE — 2580000003 HC RX 258

## 2021-09-27 PROCEDURE — 6370000000 HC RX 637 (ALT 250 FOR IP): Performed by: FAMILY MEDICINE

## 2021-09-27 PROCEDURE — 6360000002 HC RX W HCPCS

## 2021-09-27 PROCEDURE — 83735 ASSAY OF MAGNESIUM: CPT

## 2021-09-27 RX ORDER — POTASSIUM CHLORIDE 20 MEQ/1
40 TABLET, EXTENDED RELEASE ORAL PRN
Status: DISCONTINUED | OUTPATIENT
Start: 2021-09-27 | End: 2021-09-30 | Stop reason: HOSPADM

## 2021-09-27 RX ORDER — POTASSIUM CHLORIDE 7.45 MG/ML
10 INJECTION INTRAVENOUS PRN
Status: DISCONTINUED | OUTPATIENT
Start: 2021-09-27 | End: 2021-09-30 | Stop reason: HOSPADM

## 2021-09-27 RX ADMIN — AMIODARONE HYDROCHLORIDE 100 MG: 200 TABLET ORAL at 08:47

## 2021-09-27 RX ADMIN — VANCOMYCIN HYDROCHLORIDE 1250 MG: 10 INJECTION, POWDER, LYOPHILIZED, FOR SOLUTION INTRAVENOUS at 15:18

## 2021-09-27 RX ADMIN — SUCRALFATE 1 G: 1 TABLET ORAL at 17:49

## 2021-09-27 RX ADMIN — APIXABAN 2.5 MG: 2.5 TABLET, FILM COATED ORAL at 08:47

## 2021-09-27 RX ADMIN — CEFEPIME 2000 MG: 2 INJECTION, POWDER, FOR SOLUTION INTRAVENOUS at 12:10

## 2021-09-27 RX ADMIN — CEFEPIME HYDROCHLORIDE 2000 MG: 2 INJECTION, POWDER, FOR SOLUTION INTRAVENOUS at 01:17

## 2021-09-27 RX ADMIN — SUCRALFATE 1 G: 1 TABLET ORAL at 05:23

## 2021-09-27 RX ADMIN — SUCRALFATE 1 G: 1 TABLET ORAL at 12:11

## 2021-09-27 RX ADMIN — CEFEPIME 2000 MG: 2 INJECTION, POWDER, FOR SOLUTION INTRAVENOUS at 20:28

## 2021-09-27 RX ADMIN — FERROUS SULFATE TAB 325 MG (65 MG ELEMENTAL FE) 325 MG: 325 (65 FE) TAB at 17:49

## 2021-09-27 RX ADMIN — APIXABAN 2.5 MG: 2.5 TABLET, FILM COATED ORAL at 20:29

## 2021-09-27 RX ADMIN — IPRATROPIUM BROMIDE 0.5 MG: 0.5 SOLUTION RESPIRATORY (INHALATION) at 17:46

## 2021-09-27 RX ADMIN — SUCRALFATE 1 G: 1 TABLET ORAL at 01:17

## 2021-09-27 RX ADMIN — IPRATROPIUM BROMIDE 0.5 MG: 0.5 SOLUTION RESPIRATORY (INHALATION) at 08:12

## 2021-09-27 RX ADMIN — SODIUM CHLORIDE: 9 INJECTION, SOLUTION INTRAVENOUS at 15:17

## 2021-09-27 RX ADMIN — IPRATROPIUM BROMIDE 0.5 MG: 0.5 SOLUTION RESPIRATORY (INHALATION) at 12:50

## 2021-09-27 RX ADMIN — FERROUS SULFATE TAB 325 MG (65 MG ELEMENTAL FE) 325 MG: 325 (65 FE) TAB at 08:47

## 2021-09-27 RX ADMIN — TBO-FILGRASTIM 300 MCG: 300 INJECTION, SOLUTION SUBCUTANEOUS at 21:48

## 2021-09-27 RX ADMIN — SODIUM CHLORIDE: 9 INJECTION, SOLUTION INTRAVENOUS at 17:49

## 2021-09-27 RX ADMIN — ATORVASTATIN CALCIUM 10 MG: 10 TABLET, FILM COATED ORAL at 08:47

## 2021-09-27 RX ADMIN — BUDESONIDE 250 MCG: 0.25 SUSPENSION RESPIRATORY (INHALATION) at 08:12

## 2021-09-27 RX ADMIN — ARFORMOTEROL TARTRATE 15 MCG: 15 SOLUTION RESPIRATORY (INHALATION) at 08:12

## 2021-09-27 RX ADMIN — LOSARTAN POTASSIUM 100 MG: 50 TABLET, FILM COATED ORAL at 08:46

## 2021-09-27 RX ADMIN — ACETAMINOPHEN 650 MG: 325 TABLET ORAL at 04:23

## 2021-09-27 RX ADMIN — SUCRALFATE 1 G: 1 TABLET ORAL at 23:59

## 2021-09-27 RX ADMIN — SODIUM CHLORIDE 75 ML/HR: 9 INJECTION, SOLUTION INTRAVENOUS at 04:24

## 2021-09-27 ASSESSMENT — PAIN DESCRIPTION - LOCATION: LOCATION: ABDOMEN

## 2021-09-27 ASSESSMENT — PAIN SCALES - GENERAL
PAINLEVEL_OUTOF10: 0
PAINLEVEL_OUTOF10: 7
PAINLEVEL_OUTOF10: 0

## 2021-09-27 ASSESSMENT — PAIN DESCRIPTION - FREQUENCY: FREQUENCY: INTERMITTENT

## 2021-09-27 ASSESSMENT — PAIN DESCRIPTION - DESCRIPTORS: DESCRIPTORS: DULL

## 2021-09-27 ASSESSMENT — PAIN DESCRIPTION - PAIN TYPE: TYPE: ACUTE PAIN

## 2021-09-27 NOTE — PROGRESS NOTES
Pharmacy Consultation Note  (Antibiotic Dosing and Monitoring)    Initial consult date: 9/25/21  Consulting physician/provider: Wally Sethi  Drug: Vancomycin  Indication: sepsis of unknown etiology    Age/  Gender Height Weight IBW  Allergy Information   71 y.o./male 5' 9\" (175.3 cm) 147 lb (66.7 kg)     Ideal body weight: 70.7 kg (155 lb 13.8 oz)   Patient has no known allergies. Renal Function:  Recent Labs     09/24/21  2252 09/26/21  0502 09/27/21  0641   BUN 15 13 12   CREATININE 1.5* 1.2 1.0       Intake/Output Summary (Last 24 hours) at 9/27/2021 1312  Last data filed at 9/27/2021 0908  Gross per 24 hour   Intake 618 ml   Output 400 ml   Net 218 ml       Vancomycin Monitoring:  Trough:  No results for input(s): VANCOTROUGH in the last 72 hours. Random:    Recent Labs     09/27/21  0641   VANCORANDOM 14.1       Vancomycin Administration Times:  Recent vancomycin administrations                   vancomycin (VANCOCIN) 1,250 mg in dextrose 5 % 250 mL IVPB (mg) 1,250 mg New Bag 09/26/21 1731    vancomycin 1000 mg IVPB in 250 mL D5W addavial (mg) 1,000 mg New Bag 09/25/21 1735    vancomycin (VANCOCIN) 1,250 mg in dextrose 5 % 250 mL IVPB (mg) 1,250 mg New Bag 09/25/21 0336                Assessment:  · Patient is a 80 y.o. male who has been initiated on vancomycin for sepsis of unknown etiology; patient is immunocompromised (non-small cell lung CA)  · Estimated Creatinine Clearance: 65 mL/min (based on SCr of 1 mg/dL).   · To dose vancomycin, pharmacy will be utilizing [employer] calculation software for goal AUC/CHARMAINE 400-600 mg/L-hr   · 9/25: WBC low at 2.8; patient currently afebrile; Scr elevated (1.5 mg/dL) from baseline of ~1.1 (mg/dL)  · 9/26: afebrile; Scr decreased from 1.5 yesterday to 1.2 today  · 9/27: afebrile; Scr 1 mg/dL (baseline); random vancomycin level this am = 14.1 mg/L (~11.5 hours post-vancomycin infusion)    Plan:  · Will increase vancomycin to 1250 mg IV every 18 hours (est ; est tough 15.6 mg/L)  · Will check vancomycin levels when appropriate  · Will continue to monitor renal function   · Clinical pharmacy to follow      Pancho Adhikari PharmD  Pharmacy Resident  P: [id number]   9/27/2021 1:12 PM

## 2021-09-27 NOTE — PROGRESS NOTES
Hospitalist Progress Note      SYNOPSIS: Patient admitted on 2021 for Hypotension    Patient presented to the emergency department with fatigue, weakness and low blood pressure. This is been worsening over the last couple of days. Family states that this morning the patient was lethargic. They checked his blood pressure and it was 90/40. Patient also reports some mild abdominal pain. Vital signs were assessed on arrival and he had a temperature of 100.7, heart rate of 114 and a blood pressure of 91/42. Laboratory studies reveal creatinine 1.5, hemoglobin 9.0. COVID-19 negative. CT chest and abdomen pelvis showed no acute findings. Emergency department personnel empirically cover the patient with vancomycin and cefepime. SUBJECTIVE:    Patient seen and examined  Records reviewed. The pt states that he feels much better and breathing has improved. She reports only one formed bowel movement today. Temp (24hrs), Av.3 °F (36.8 °C), Min:97.9 °F (36.6 °C), Max:99 °F (37.2 °C)    DIET: ADULT DIET; Regular  CODE: Full Code    Intake/Output Summary (Last 24 hours) at 2021 1111  Last data filed at 2021 0908  Gross per 24 hour   Intake 618 ml   Output 400 ml   Net 218 ml       OBJECTIVE:    BP (!) 108/56   Pulse 90   Temp 98.2 °F (36.8 °C) (Temporal)   Resp 22   Ht 5' 9\" (1.753 m)   Wt 147 lb (66.7 kg)   SpO2 96%   BMI 21.71 kg/m²     General appearance: No apparent distress, appears stated age and cooperative. HEENT:  Conjunctivae/corneas clear. Neck: Supple. No jugular venous distention. Respiratory: Diminished. Some Ronchi  Cardiovascular: Regular rate rhythm, normal S1-S2  Abdomen: Soft, nontender, nondistended  Musculoskeletal: No clubbing, cyanosis, no bilateral lower extremity edema. Brisk capillary refill.    Skin:  No rashes  on visible skin  Neurologic: awake, alert and following commands     ASSESSMENT:    Sepsis Unclear source  Acute Renal failure  History of colon cancer  COPD  HTN  HLD  History of lung cancer status post radiation  History of adenocarcinoma left upper lobe, status post resection  Pancytopenia  Paroxysmal A. fib     PLAN:    Continue vancomycin and cefepime  Urinalysis and urine cultures pending  Blood cultures pending. Will consult ID for further recommendations as well. Elevated procalcitonin. Improving with treatment. Renal functions improved  Hem-Onc consult apprecaited. MRI of the pelvis for possible metastatic disease. Pancytopenia and monitor ANC   Cont. Amiodarone. Continue Eliquis  Continue losartan and atorvastatin  Continue breathing treatments  C-diff pending. Follow blood cultures.        DISPOSITION:     Medications:  REVIEWED DAILY    Infusion Medications    sodium chloride      sodium chloride 75 mL/hr (09/27/21 0424)     Scheduled Medications    cefepime  2,000 mg IntraVENous Q8H    sodium chloride   IntraVENous Q8H    vancomycin  1,250 mg IntraVENous Q24H    amiodarone  100 mg Oral Daily    apixaban  2.5 mg Oral BID    atorvastatin  10 mg Oral Daily    losartan  100 mg Oral Daily    sucralfate  1 g Oral 4 times per day    sodium chloride flush  10 mL IntraVENous 2 times per day    ferrous sulfate  325 mg Oral BID WC    budesonide  0.25 mg Nebulization BID    And    ipratropium  0.5 mg Nebulization 4x daily    And    Arformoterol Tartrate  15 mcg Nebulization BID    sodium chloride   IntraVENous Q12H     PRN Meds: potassium chloride **OR** potassium alternative oral replacement **OR** potassium chloride, albuterol, sodium chloride flush, sodium chloride, promethazine **OR** ondansetron, polyethylene glycol, acetaminophen **OR** acetaminophen    Labs:     Recent Labs     09/24/21  2252 09/26/21  0502 09/27/21  0641   WBC 2.8* 2.2* 1.5*   HGB 9.0* 9.9* 7.7*   HCT 27.5* 31.5* 23.8*   * 111* 82*       Recent Labs     09/24/21  2252 09/26/21  0502 09/27/21  0641    135 138   K 3.8 3.5 3.2*    102 108* CO2 22 24 18*   BUN 15 13 12   CREATININE 1.5* 1.2 1.0   CALCIUM 9.0 8.6 8.0*       Recent Labs     09/24/21  2252 09/26/21  0502 09/27/21  0641   PROT 6.2* 5.9* 5.4*   ALKPHOS 57 48 43   ALT 6 26 18   AST 19 47* 33   BILITOT 0.9 0.7 0.3   LIPASE 15  --   --        No results for input(s): INR in the last 72 hours. No results for input(s): Assunta Taylor in the last 72 hours. Chronic labs:    Lab Results   Component Value Date    CHOL 160 03/16/2021    TRIG 69 03/16/2021    HDL 64 03/16/2021    LDLCALC 82 03/16/2021    INR 1.1 06/17/2021    LABA1C 4.8 09/14/2020       Radiology: REVIEWED DAILY    +++++++++++++++++++++++++++++++++++++++++++++++++  Patricia Multani MD  [facility] Physician - [address], [place]  +++++++++++++++++++++++++++++++++++++++++++++++++  NOTE: This report was transcribed using voice recognition software. Every effort was made to ensure accuracy; however, inadvertent computerized transcription errors may be present.

## 2021-09-27 NOTE — PROGRESS NOTES
Subjective: The patient is awake and alert. No problems overnight. Denies chest pain, angina, dyspnea or abdominal discomfort. No nausea or vomiting. Tolerating diet. Objective:    BP (!) 115/59   Pulse 80   Temp 96.9 °F (36.1 °C) (Temporal)   Resp 16   Ht 5' 9\" (1.753 m)   Wt 147 lb (66.7 kg)   SpO2 98%   BMI 21.71 kg/m²     General: NAD  HEENT: No thrush or mucositis, EOMI  Heart:  RRR, no murmurs, gallops, or rubs.   Lungs:  Decreased BS bilaterally, no wheezes  Abd: BS present, nontender, nondistended, no masses  Extrem:  No clubbing, cyanosis, or edema  Lymphatics: No palpable adenopathy in cervical and supraclavicular regions  Skin: Intact, no petechia or purpura    CBC with Differential:    Lab Results   Component Value Date    WBC 1.5 09/27/2021    RBC 2.48 09/27/2021    HGB 7.7 09/27/2021    HCT 23.8 09/27/2021    PLT 82 09/27/2021    MCV 96.0 09/27/2021    MCH 31.0 09/27/2021    MCHC 32.4 09/27/2021    RDW 18.1 09/27/2021    NRBC 0.9 09/24/2021    SEGSPCT 82.8 08/22/2021    LYMPHOPCT 11.7 09/27/2021    MONOPCT 16.2 09/27/2021    MYELOPCT 0.9 08/09/2021    BASOPCT 0.0 09/27/2021    MONOSABS 0.25 09/27/2021    LYMPHSABS 0.18 09/27/2021    EOSABS 0.02 09/27/2021    BASOSABS 0.00 09/27/2021     CMP:    Lab Results   Component Value Date     09/27/2021    K 3.2 09/27/2021     09/27/2021    CO2 18 09/27/2021    BUN 12 09/27/2021    CREATININE 1.0 09/27/2021    GFRAA >60 09/27/2021    AGRATIO 1.4 08/22/2021    LABGLOM >60 09/27/2021    GLUCOSE 144 09/27/2021    PROT 5.4 09/27/2021    LABALBU 3.1 09/27/2021    CALCIUM 8.0 09/27/2021    BILITOT 0.3 09/27/2021    ALKPHOS 43 09/27/2021    AST 33 09/27/2021    ALT 18 09/27/2021      XBYS:[id number]}     Current Facility-Administered Medications:     potassium chloride (KLOR-CON M) extended release tablet 40 mEq, 40 mEq, Oral, PRN **OR** potassium bicarb-citric acid (EFFER-K) effervescent tablet 40 mEq, 40 mEq, Oral, PRN **OR** potassium chloride 10 mEq/100 mL IVPB (Peripheral Line), 10 mEq, IntraVENous, PRN, Vineet Cedillo MD    cefepime (MAXIPIME) 2000 mg IVPB extended (mini-bag), 2,000 mg, IntraVENous, Q8H, Kim Mccallum MD, Stopped at 09/27/21 1511    0.9 % sodium chloride infusion admixture, , IntraVENous, Q8H, Kim Mccallum MD, Last Rate: 12.5 mL/hr at 09/27/21 1517, New Bag at 09/27/21 1517    vancomycin (VANCOCIN) 1,250 mg in dextrose 5 % 250 mL IVPB, 1,250 mg, IntraVENous, Q18H, Jennyfer Polverine, H, Stopped at 09/27/21 1650    albuterol (ACCUNEB) nebulizer solution 0.63 mg, 1 ampule, Nebulization, Q6H PRN, Vickie Grooms, DO    amiodarone (CORDARONE) tablet 100 mg, 100 mg, Oral, Daily, Vickie Grooms, DO, 100 mg at 09/27/21 0847    apixaban (ELIQUIS) tablet 2.5 mg, 2.5 mg, Oral, BID, Vickie Grooms, DO, 2.5 mg at 09/27/21 0847    atorvastatin (LIPITOR) tablet 10 mg, 10 mg, Oral, Daily, Vickie Grooms, DO, 10 mg at 09/27/21 0847    losartan (COZAAR) tablet 100 mg, 100 mg, Oral, Daily, Vickie Grooms, DO, 100 mg at 09/27/21 0846    sucralfate (CARAFATE) tablet 1 g, 1 g, Oral, 4 times per day, Vickie Grooms, DO, 1 g at 09/27/21 1749    sodium chloride flush 0.9 % injection 10 mL, 10 mL, IntraVENous, 2 times per day, Vickie Grooms, DO    sodium chloride flush 0.9 % injection 10 mL, 10 mL, IntraVENous, PRN, Vickie Grooms, DO    0.9 % sodium chloride infusion, 25 mL, IntraVENous, PRN, Vickie Grooms, DO    promethazine (PHENERGAN) tablet 12.5 mg, 12.5 mg, Oral, Q6H PRN **OR** ondansetron (ZOFRAN) injection 4 mg, 4 mg, IntraVENous, Q6H PRN, Vickie Grooms, DO    polyethylene glycol (GLYCOLAX) packet 17 g, 17 g, Oral, Daily PRN, Vickie Grooms, DO    acetaminophen (TYLENOL) tablet 650 mg, 650 mg, Oral, Q6H PRN, 650 mg at 09/27/21 0423 **OR** acetaminophen (TYLENOL) suppository 650 mg, 650 mg, Rectal, Q6H PRN, Vickie Grooms, DO    0.9 % sodium chloride infusion, , IntraVENous, Continuous, Vickie Grooms, DO, Last Rate: 75 mL/hr at 09/27/21 1749, New Bag at 09/27/21 1749    ferrous sulfate (IRON 325) tablet 325 mg, 325 mg, Oral, BID WC, Maxime Landing, DO, 325 mg at 09/27/21 1749    budesonide (PULMICORT) nebulizer suspension 250 mcg, 0.25 mg, Nebulization, BID, 250 mcg at 09/27/21 8070 **AND** ipratropium (ATROVENT) 0.02 % nebulizer solution 0.5 mg, 0.5 mg, Nebulization, 4x daily, 0.5 mg at 09/27/21 1746 **AND** Arformoterol Tartrate (BROVANA) nebulizer solution 15 mcg, 15 mcg, Nebulization, BID, Maxime Landing, DO, 15 mcg at 09/27/21 3276    0.9 % sodium chloride infusion admixture, , IntraVENous, Q12H, Maxime Landing, DO    CT ABDOMEN PELVIS W IV CONTRAST Additional Contrast? None   Final Result   Mild diverticulosis. Heterogeneous enlarged prostate. Subtle area of sclerosis in the left iliac wing is more conspicuous than   previous. Developing metastatic disease is not excluded and follow-up within   3 months is recommended. This could also be further assessed with MRI as   indicated clinically. Other chronic appearing findings. CTA PULMONARY W CONTRAST   Final Result   No evidence of pulmonary emboli. Status post left upper lobectomy with large cavity in the left upper lung   similar to the prior examination. Multifocal interstitial coarsening and pleuroparenchymal scarring in the left   lower lung with surgical sutures, similar to the prior examination. Stable lobulated nodular mass measuring approximately 1.2 cm in diameter in   the area of the lingula. .      Pleural based densities likely pleuroparenchymal scarring in the lower lungs   bilaterally, unchanged. Nonspecific hilar and mediastinal nodes. XR CHEST (2 VW)   Final Result   No significant change. Continued follow-up recommended. Please see report   from recent chest CT dated 08/23/2021. Assessment:    Active Problems:    Sepsis ([address]  Resolved Problems:    * No resolved hospital problems. *    80 y. o. male known to Dr. Fernandez Hood with LEONARDO lung adenocarcinoma diagnosed in 04/2020 s/p LEONARDO lobectomy and LLL wedge resection in 07/2020. Had recurrent disease treated with chemoradiation carboplatin and paclitaxel from 07/2021-09/2021. Admitted on 9/24 with sepsis and ALBAN. CT abdomen/pelvis showed mild diverticulosis, heterogenous and enlarged prostate, subtle area of sclerosis in the left iliac wing probably developing metastatic disease now 8 mm (from 4mm). Plan:  Pancytopenia secondary to antineoplastic chemotherapy and now with neutropenic infection. Start GCSF support to improve ANC >1500. Continue broad antibiotics. Monitor CBC w diff daily  If platelets < 40F then will need to hold anticoagulation. MRI pelvis after acute issues resolve.       Electronically signed by Shreya Tate MD on 9/27/2021 at 6:22 PM

## 2021-09-27 NOTE — CARE COORDINATION
SOCIAL WORK/CASEMANAGEMENT TRANSITION OF CARE [id number], [address], [place], [phone]): I met with pt in the room this a.m. the pt lives with his daughter, Pj Orellana, with her spouse and a 3year old. Pepito Burkett is also pregnant per pt. Pt is not a . He lives in a ranch home with 4 garage steps to enter. There is a walk in shower with a built in seat. Pt said he was independent pta. His daughter prepares meals but pt said he is able to get on his own as well as his medications. Pt has a cane, fww and 3:1 commode. He is active with [facility] for nsg and will need danielle orders on discharge. Plan is home. Charlene/alva to follow.  SARAH Jack  9/27/2021

## 2021-09-27 NOTE — PROGRESS NOTES
ADVANCED CARE PLANNING    Patient Name: Kristi Hussein       YOB: 1950              MRN:    [id number]  Admission Date:  9/24/2021 10:47 PM      Active Diagnoses: Active Problems:    Sepsis ([address]  Resolved Problems:    * No resolved hospital problems. *      These active diagnoses are of sufficient risk that focused discussion on advanced care planning is indicated in order to allow the patient to thoughtfully consider personal goals of care; and, if situations arise that prevent the patient to personally give input, to ensure appropriate representation of their personal desire for different levels and levels of care. Person(s) present in discussion: Pilar Dixon MD, Family members: none    Discussion: I reviewed his admission for the above diagnoses and his desires for ongoing aggresive care, including potential intubation and mechanical ventilation; also discussed who would speak on his behalf should he be unable to do so and discussed what conversations he has had with his family so they understand his desires if such a situation occurred now or in the future. I presented and explained the availability of our palliaitve care team to him, which he will review this evening and then fill out. PLAN: Pt wants to think about it and will fill out paper work at a later point. He currently wants to be full code. Code Status:  At this time patient wishes to be Full Code      Time Spent on Advanced Planning Documents: 18 minutes    Vineet Cedillo MD  [facility] Physician - [address] [facility]

## 2021-09-27 NOTE — PATIENT CARE CONFERENCE
P Quality Flow/Interdisciplinary Rounds Progress Note        Quality Flow Rounds held on September 27, 2021    Disciplines Attending:  Bedside Nurse, ,  and Nursing Unit Leadership    Kevin Guadalupe was admitted on 9/24/2021 10:47 PM    Anticipated Discharge Date:  Expected Discharge Date: 09/30/21    Disposition:    Timothy Score:  Timothy Scale Score: 16    Readmission Risk              Risk of Unplanned Readmission:  20           Discussed patient goal for the day, patient clinical progression, and barriers to discharge.   The following Goal(s) of the Day/Commitment(s) have been identified:  R/O C-diff Isolation (contact)      Melo Drew RN  September 27, 2021

## 2021-09-28 PROBLEM — E43 SEVERE PROTEIN-CALORIE MALNUTRITION (HCC): Chronic | Status: ACTIVE | Noted: 2021-09-28

## 2021-09-28 LAB
ALBUMIN SERPL-MCNC: 3.2 G/DL (ref 3.5–5.2)
ALP BLD-CCNC: 42 U/L (ref 40–129)
ALT SERPL-CCNC: 16 U/L (ref 0–40)
ANION GAP SERPL CALCULATED.3IONS-SCNC: 10 MMOL/L (ref 7–16)
ANISOCYTOSIS: ABNORMAL
AST SERPL-CCNC: 27 U/L (ref 0–39)
BASOPHILS ABSOLUTE: 0.05 E9/L (ref 0–0.2)
BASOPHILS RELATIVE PERCENT: 0.9 % (ref 0–2)
BILIRUB SERPL-MCNC: 0.4 MG/DL (ref 0–1.2)
BUN BLDV-MCNC: 8 MG/DL (ref 6–23)
CALCIUM SERPL-MCNC: 8.7 MG/DL (ref 8.6–10.2)
CHLORIDE BLD-SCNC: 109 MMOL/L (ref 98–107)
CO2: 21 MMOL/L (ref 22–29)
CREAT SERPL-MCNC: 1 MG/DL (ref 0.7–1.2)
EOSINOPHILS ABSOLUTE: 0.19 E9/L (ref 0.05–0.5)
EOSINOPHILS RELATIVE PERCENT: 3.5 % (ref 0–6)
GFR AFRICAN AMERICAN: >60
GFR NON-AFRICAN AMERICAN: >60 ML/MIN/1.73
GLUCOSE BLD-MCNC: 95 MG/DL (ref 74–99)
HCT VFR BLD CALC: 24.4 % (ref 37–54)
HEMOGLOBIN: 8 G/DL (ref 12.5–16.5)
HYPOCHROMIA: ABNORMAL
LYMPHOCYTES ABSOLUTE: 0.55 E9/L (ref 1.5–4)
LYMPHOCYTES RELATIVE PERCENT: 9.6 % (ref 20–42)
MAGNESIUM: 1.7 MG/DL (ref 1.6–2.6)
MCH RBC QN AUTO: 31 PG (ref 26–35)
MCHC RBC AUTO-ENTMCNC: 32.8 % (ref 32–34.5)
MCV RBC AUTO: 94.6 FL (ref 80–99.9)
MONOCYTES ABSOLUTE: 0.22 E9/L (ref 0.1–0.95)
MONOCYTES RELATIVE PERCENT: 3.5 % (ref 2–12)
NEUTROPHILS ABSOLUTE: 4.57 E9/L (ref 1.8–7.3)
NEUTROPHILS RELATIVE PERCENT: 82.5 % (ref 43–80)
OVALOCYTES: ABNORMAL
PDW BLD-RTO: 18.1 FL (ref 11.5–15)
PLATELET # BLD: 91 E9/L (ref 130–450)
PLATELET CONFIRMATION: NORMAL
PMV BLD AUTO: 10.3 FL (ref 7–12)
POIKILOCYTES: ABNORMAL
POLYCHROMASIA: ABNORMAL
POTASSIUM REFLEX MAGNESIUM: 3.4 MMOL/L (ref 3.5–5)
PROCALCITONIN: 1.33 NG/ML (ref 0–0.08)
RBC # BLD: 2.58 E12/L (ref 3.8–5.8)
SCHISTOCYTES: ABNORMAL
SODIUM BLD-SCNC: 140 MMOL/L (ref 132–146)
TEAR DROP CELLS: ABNORMAL
TOTAL PROTEIN: 5.2 G/DL (ref 6.4–8.3)
URINE CULTURE, ROUTINE: NORMAL
WBC # BLD: 5.5 E9/L (ref 4.5–11.5)

## 2021-09-28 PROCEDURE — 84145 PROCALCITONIN (PCT): CPT

## 2021-09-28 PROCEDURE — 6360000002 HC RX W HCPCS: Performed by: FAMILY MEDICINE

## 2021-09-28 PROCEDURE — 2140000000 HC CCU INTERMEDIATE R&B

## 2021-09-28 PROCEDURE — 2580000003 HC RX 258: Performed by: INTERNAL MEDICINE

## 2021-09-28 PROCEDURE — 85025 COMPLETE CBC W/AUTO DIFF WBC: CPT

## 2021-09-28 PROCEDURE — 83735 ASSAY OF MAGNESIUM: CPT

## 2021-09-28 PROCEDURE — 94640 AIRWAY INHALATION TREATMENT: CPT

## 2021-09-28 PROCEDURE — 6360000002 HC RX W HCPCS

## 2021-09-28 PROCEDURE — 6360000002 HC RX W HCPCS: Performed by: INTERNAL MEDICINE

## 2021-09-28 PROCEDURE — 80053 COMPREHEN METABOLIC PANEL: CPT

## 2021-09-28 PROCEDURE — 6370000000 HC RX 637 (ALT 250 FOR IP): Performed by: INTERNAL MEDICINE

## 2021-09-28 PROCEDURE — 2580000003 HC RX 258

## 2021-09-28 PROCEDURE — 36415 COLL VENOUS BLD VENIPUNCTURE: CPT

## 2021-09-28 PROCEDURE — 6370000000 HC RX 637 (ALT 250 FOR IP): Performed by: FAMILY MEDICINE

## 2021-09-28 PROCEDURE — 2580000003 HC RX 258: Performed by: FAMILY MEDICINE

## 2021-09-28 RX ADMIN — BUDESONIDE 250 MCG: 0.25 SUSPENSION RESPIRATORY (INHALATION) at 20:22

## 2021-09-28 RX ADMIN — BUDESONIDE 250 MCG: 0.25 SUSPENSION RESPIRATORY (INHALATION) at 09:58

## 2021-09-28 RX ADMIN — TBO-FILGRASTIM 300 MCG: 300 INJECTION, SOLUTION SUBCUTANEOUS at 20:21

## 2021-09-28 RX ADMIN — CEFEPIME 2000 MG: 2 INJECTION, POWDER, FOR SOLUTION INTRAVENOUS at 11:46

## 2021-09-28 RX ADMIN — LOSARTAN POTASSIUM 100 MG: 50 TABLET, FILM COATED ORAL at 09:28

## 2021-09-28 RX ADMIN — CEFEPIME 2000 MG: 2 INJECTION, POWDER, FOR SOLUTION INTRAVENOUS at 03:26

## 2021-09-28 RX ADMIN — APIXABAN 2.5 MG: 2.5 TABLET, FILM COATED ORAL at 09:28

## 2021-09-28 RX ADMIN — APIXABAN 2.5 MG: 2.5 TABLET, FILM COATED ORAL at 20:20

## 2021-09-28 RX ADMIN — POTASSIUM CHLORIDE 40 MEQ: 1500 TABLET, EXTENDED RELEASE ORAL at 09:28

## 2021-09-28 RX ADMIN — SODIUM CHLORIDE: 9 INJECTION, SOLUTION INTRAVENOUS at 16:57

## 2021-09-28 RX ADMIN — SODIUM CHLORIDE, PRESERVATIVE FREE 10 ML: 5 INJECTION INTRAVENOUS at 09:29

## 2021-09-28 RX ADMIN — ACETAMINOPHEN 650 MG: 325 TABLET ORAL at 02:11

## 2021-09-28 RX ADMIN — FERROUS SULFATE TAB 325 MG (65 MG ELEMENTAL FE) 325 MG: 325 (65 FE) TAB at 16:56

## 2021-09-28 RX ADMIN — ARFORMOTEROL TARTRATE 15 MCG: 15 SOLUTION RESPIRATORY (INHALATION) at 20:22

## 2021-09-28 RX ADMIN — SUCRALFATE 1 G: 1 TABLET ORAL at 16:56

## 2021-09-28 RX ADMIN — ARFORMOTEROL TARTRATE 15 MCG: 15 SOLUTION RESPIRATORY (INHALATION) at 09:59

## 2021-09-28 RX ADMIN — SUCRALFATE 1 G: 1 TABLET ORAL at 06:21

## 2021-09-28 RX ADMIN — IPRATROPIUM BROMIDE 0.5 MG: 0.5 SOLUTION RESPIRATORY (INHALATION) at 16:04

## 2021-09-28 RX ADMIN — VANCOMYCIN HYDROCHLORIDE 1250 MG: 10 INJECTION, POWDER, LYOPHILIZED, FOR SOLUTION INTRAVENOUS at 09:27

## 2021-09-28 RX ADMIN — ATORVASTATIN CALCIUM 10 MG: 10 TABLET, FILM COATED ORAL at 09:27

## 2021-09-28 RX ADMIN — IPRATROPIUM BROMIDE 0.5 MG: 0.5 SOLUTION RESPIRATORY (INHALATION) at 09:58

## 2021-09-28 RX ADMIN — SUCRALFATE 1 G: 1 TABLET ORAL at 11:55

## 2021-09-28 RX ADMIN — CEFEPIME 2000 MG: 2 INJECTION, POWDER, FOR SOLUTION INTRAVENOUS at 20:39

## 2021-09-28 RX ADMIN — FERROUS SULFATE TAB 325 MG (65 MG ELEMENTAL FE) 325 MG: 325 (65 FE) TAB at 09:29

## 2021-09-28 RX ADMIN — AMIODARONE HYDROCHLORIDE 100 MG: 200 TABLET ORAL at 09:28

## 2021-09-28 RX ADMIN — IPRATROPIUM BROMIDE 0.5 MG: 0.5 SOLUTION RESPIRATORY (INHALATION) at 20:22

## 2021-09-28 ASSESSMENT — PAIN SCALES - GENERAL
PAINLEVEL_OUTOF10: 0

## 2021-09-28 NOTE — PROGRESS NOTES
Hospitalist Progress Note      SYNOPSIS: Patient admitted on 2021 for Hypotension    Patient presented to the emergency department with fatigue, weakness and low blood pressure. This is been worsening over the last couple of days. Family states that this morning the patient was lethargic. They checked his blood pressure and it was 90/40. Patient also reports some mild abdominal pain. Vital signs were assessed on arrival and he had a temperature of 100.7, heart rate of 114 and a blood pressure of 91/42. Laboratory studies reveal creatinine 1.5, hemoglobin 9.0. COVID-19 negative. CT chest and abdomen pelvis showed no acute findings. Emergency department personnel empirically cover the patient with vancomycin and cefepime. SUBJECTIVE:    Patient seen and examined  Records reviewed. The pt states that he feels much better       Temp (24hrs), Av.9 °F (36.6 °C), Min:97.5 °F (36.4 °C), Max:98.2 °F (36.8 °C)    DIET: ADULT DIET; Regular  Adult Oral Nutrition Supplement; Standard High Calorie/High Protein Oral Supplement  Adult Oral Nutrition Supplement; Fortified Pudding Oral Supplement  CODE: Full Code    Intake/Output Summary (Last 24 hours) at 2021 1546  Last data filed at 2021 1409  Gross per 24 hour   Intake 835.96 ml   Output 700 ml   Net 135.96 ml       OBJECTIVE:    BP (!) 107/55   Pulse 75   Temp 97.5 °F (36.4 °C) (Temporal)   Resp 20   Ht 5' 9\" (1.753 m)   Wt 147 lb (66.7 kg)   SpO2 99%   BMI 21.71 kg/m²     General appearance: No apparent distress, appears stated age and cooperative. HEENT:  Conjunctivae/corneas clear. Neck: Supple. No jugular venous distention. Respiratory: Diminished. Some Ronchi  Cardiovascular: Regular rate rhythm, normal S1-S2  Abdomen: Soft, nontender, nondistended  Musculoskeletal: No clubbing, cyanosis, no bilateral lower extremity edema. Brisk capillary refill.    Skin:  No rashes  on visible skin  Neurologic: awake, alert and following commands     ASSESSMENT:    Sepsis Unclear source  Acute Renal failure  History of colon cancer  COPD  HTN  HLD  History of lung cancer status post radiation  History of adenocarcinoma left upper lobe, status post resection  Pancytopenia  Paroxysmal A. fib     PLAN:    Continue vancomycin and cefepime  Urinalysis and urine cultures pending  Blood cultures pending. Will consult ID for further recommendations as well. Elevated procalcitonin. Improving with treatment. Renal functions improved  Hem-Onc consult apprecaited. MRI of the pelvis for possible metastatic disease. Pancytopenia and monitor ANC   Cont. Amiodarone. Continue Eliquis  Continue losartan and atorvastatin  Continue breathing treatments  C-diff pending. Follow blood cultures.        DISPOSITION: downgrade med/surg    Medications:  REVIEWED DAILY    Infusion Medications    sodium chloride      sodium chloride 75 mL/hr at 09/27/21 7626     Scheduled Medications    cefepime  2,000 mg IntraVENous Q8H    sodium chloride   IntraVENous Q8H    tbo-filgrastim  300 mcg SubCUTAneous Daily    amiodarone  100 mg Oral Daily    apixaban  2.5 mg Oral BID    atorvastatin  10 mg Oral Daily    losartan  100 mg Oral Daily    sucralfate  1 g Oral 4 times per day    sodium chloride flush  10 mL IntraVENous 2 times per day    ferrous sulfate  325 mg Oral BID WC    budesonide  0.25 mg Nebulization BID    And    ipratropium  0.5 mg Nebulization 4x daily    And    Arformoterol Tartrate  15 mcg Nebulization BID    sodium chloride   IntraVENous Q12H     PRN Meds: potassium chloride **OR** potassium alternative oral replacement **OR** potassium chloride, albuterol, sodium chloride flush, sodium chloride, promethazine **OR** ondansetron, polyethylene glycol, acetaminophen **OR** acetaminophen    Labs:     Recent Labs     09/26/21  0502 09/27/21  0641 09/28/21  0559   WBC 2.2* 1.5* 5.5   HGB 9.9* 7.7* 8.0*   HCT 31.5* 23.8* 24.4*   * 82* 91* Recent Labs     09/26/21  0502 09/27/21  0641 09/28/21  0559    138 140   K 3.5 3.2* 3.4*    108* 109*   CO2 24 18* 21*   BUN 13 12 8   CREATININE 1.2 1.0 1.0   CALCIUM 8.6 8.0* 8.7       Recent Labs     09/26/21  0502 09/27/21  0641 09/28/21  0559   PROT 5.9* 5.4* 5.2*   ALKPHOS 48 43 42   ALT 26 18 16   AST 47* 33 27   BILITOT 0.7 0.3 0.4       No results for input(s): INR in the last 72 hours. No results for input(s): Kaleen Hope in the last 72 hours. Chronic labs:    Lab Results   Component Value Date    CHOL 160 03/16/2021    TRIG 69 03/16/2021    HDL 64 03/16/2021    LDLCALC 82 03/16/2021    INR 1.1 06/17/2021    LABA1C 4.8 09/14/2020       Radiology: REVIEWED DAILY    +++++++++++++++++++++++++++++++++++++++++++++++++  Jorge Lobato MD  [employer] Phys[facility] - [address], [place]  +++++++++++++++++++++++++++++++++++++++++++++++++  NOTE: This report was transcribed using voice recognition software. Every effort was made to ensure accuracy; however, inadvertent computerized transcription errors may be present.

## 2021-09-28 NOTE — PROGRESS NOTES
Wt 147 lb (66.7 kg)   SpO2 100%   BMI 21.71 kg/m²   Constitutional: Alert, not in distress  Respiratory: Clear breath sounds, no crackles, no wheezes  Cardiovascular: Regular rate and rhythm, no murmurs  Gastrointestinal: Bowel sounds present, soft, nontender  Skin: Warm and dry, no active dermatoses. Right chest wall venous access port with no surrounding erythema and tenderness. Musculoskeletal: No joint swelling, no joint erythema    Labs, imaging, and medical records/notes were personally reviewed. Assessment:  SIRS, suspect sepsis, etiology unclear  Immunocompromised state  Left upper lobe lung adenocarcinoma diagnosed in 04/2020 s/p left upper lobectomy and left lower lobe wedge resection in 18/3800 complicated by recurrent disease on chemoradiation therapy with carboplatin and paclitaxel     Recommendations:  Continue cefepime at 2 g every 8 hours for now pending final blood culture results. Stop vancomycin. Follow-up blood cultures. Consider removing venous access port if no longer indicated, as per patient's request as well. Continue supportive care.     Thank you for involving me in the care of Jolynn Arellano. I will continue to follow. Please do not hesitate to call for any questions or concerns.     Electronically signed by Elbert Centeno MD on 9/28/2021 at 10:33 AM

## 2021-09-28 NOTE — PLAN OF CARE
Problem: Falls - Risk of:  Goal: Will remain free from falls  Description: Will remain free from falls  Outcome: Met This Shift  Goal: Absence of physical injury  Description: Absence of physical injury  Outcome: Met This Shift     Problem: Skin Integrity:  Goal: Will show no infection signs and symptoms  Description: Will show no infection signs and symptoms  Outcome: Met This Shift  Goal: Absence of new skin breakdown  Description: Absence of new skin breakdown  Outcome: Met This Shift     Problem: Malnutrition  (NI-5.2)  Goal: Food and/or Nutrient Delivery  Description: Individualized approach for food/nutrient provision.   9/28/2021 1152 by Adriana Hogan RD, LD  Outcome: Met This Shift

## 2021-09-28 NOTE — PROGRESS NOTES
Pharmacy Consultation Note  (Antibiotic Dosing and Monitoring)    Initial consult date: 9/25/21  Consulting physician/provider: Wally Couch  Drug: Vancomycin  Indication: sepsis of unknown etiology     Vancomycin has been discontinued   [address] Physician to re-consult pharmacy if future dosing is needed    Thank you for the consult,    Juice Albert, PharmD  Pharmacy Resident  P: [id number]   9/28/2021 11:29 AM

## 2021-09-28 NOTE — PROGRESS NOTES
Comprehensive Nutrition Assessment    Type and Reason for Visit:  Initial, Positive Nutrition Screen    Nutrition Recommendations/Plan: Recommend and start Ensure Enlive supplement BID and Boost Pudding daily to help meet increased nutritional needs and decreased po intake of meals. Nutrition Assessment:  Patient averaging ~50% of meals served since admission ; adm w/ abd pain and fatigue ; noted ALBAN and sepsis ; hx of lung CA w/ chemotherapy/radiation s/p lobectomy ; pt also meets criteria for severe malnutrition ; hx of COPD ; will start ONS    Malnutrition Assessment:  Malnutrition Status:  Severe malnutrition    Context:  Chronic Illness     Findings of the 6 clinical characteristics of malnutrition:  Energy Intake:  7 - 75% or less estimated energy requirements for 1 month or longer  Weight Loss:  Unable to assess (d/t no actual weights available since admission)     Body Fat Loss:  7 - Severe body fat loss Orbital, Triceps   Muscle Mass Loss:  7 - Severe muscle mass loss Temples (temporalis), Clavicles (pectoralis & deltoids)  Fluid Accumulation:  No significant fluid accumulation     Strength:  Not Performed    Estimated Daily Nutrient Needs:  Energy (kcal):  9356-1399 (REE 1419 x 1.2-1.3 SF); Weight Used for Energy Requirements:  Current     Protein (g):   (1.2-1.4g/kg CBW); Weight Used for Protein Requirements:  Current        Fluid (ml/day):  4132-6418; Method Used for Fluid Requirements:  1 ml/kcal      Nutrition Related Findings:  +I&Os (+2.3 L), no edema, active BS, A&O x 4, missing teeth, fatigue, muscle/fat wasting      Wounds:  None       Current Nutrition Therapies:    ADULT DIET;  Regular    Anthropometric Measures:  · Height: 5' 9\" (175.3 cm)  · Current Body Weight: 147 lb (66.7 kg) (9/24, stated)   · Usual Body Weight: 142 lb (64.4 kg) (7/9/21, actual)     · Ideal Body Weight: 160 lbs; % Ideal Body Weight 91.9 %   · BMI: 21.7  · BMI Categories: Underweight (BMI less than 22) age over 72       Nutrition Diagnosis:   · Severe malnutrition, In context of chronic illness related to catabolic illness (hx of lung CA) as evidenced by poor intake prior to admission, severe loss of subcutaneous fat, severe muscle loss      Nutrition Interventions:   Food and/or Nutrient Delivery:  Continue Current Diet, Start Oral Nutrition Supplement  Nutrition Education/Counseling:  Education not indicated   Coordination of Nutrition Care:  Continue to monitor while inpatient    Goals:  Pt will consume ~75% of meals served       Nutrition Monitoring and Evaluation:   Behavioral-Environmental Outcomes:  None Identified   Food/Nutrient Intake Outcomes:  Food and Nutrient Intake, Supplement Intake  Physical Signs/Symptoms Outcomes:  Biochemical Data, Chewing or Swallowing, GI Status, Fluid Status or Edema, Meal Time Behavior, Hemodynamic Status, Nutrition Focused Physical Findings, Skin, Weight     Discharge Planning:     Too soon to determine     Electronically signed by Ventura Wilcox RD, LD on 9/28/21 at 11:52 AM EDT    Contact: [phone]

## 2021-09-29 LAB
ALBUMIN SERPL-MCNC: 3.4 G/DL (ref 3.5–5.2)
ALP BLD-CCNC: 54 U/L (ref 40–129)
ALT SERPL-CCNC: 28 U/L (ref 0–40)
ANION GAP SERPL CALCULATED.3IONS-SCNC: 12 MMOL/L (ref 7–16)
ANISOCYTOSIS: ABNORMAL
AST SERPL-CCNC: 38 U/L (ref 0–39)
BASOPHILS ABSOLUTE: 0 E9/L (ref 0–0.2)
BASOPHILS RELATIVE PERCENT: 0 % (ref 0–2)
BILIRUB SERPL-MCNC: 0.6 MG/DL (ref 0–1.2)
BUN BLDV-MCNC: 6 MG/DL (ref 6–23)
CALCIUM SERPL-MCNC: 8.9 MG/DL (ref 8.6–10.2)
CHLORIDE BLD-SCNC: 105 MMOL/L (ref 98–107)
CO2: 21 MMOL/L (ref 22–29)
CREAT SERPL-MCNC: 0.9 MG/DL (ref 0.7–1.2)
EOSINOPHILS ABSOLUTE: 0 E9/L (ref 0.05–0.5)
EOSINOPHILS RELATIVE PERCENT: 0 % (ref 0–6)
GFR AFRICAN AMERICAN: >60
GFR NON-AFRICAN AMERICAN: >60 ML/MIN/1.73
GLUCOSE BLD-MCNC: 94 MG/DL (ref 74–99)
HCT VFR BLD CALC: 25.8 % (ref 37–54)
HEMOGLOBIN: 8.3 G/DL (ref 12.5–16.5)
HYPOCHROMIA: ABNORMAL
LYMPHOCYTES ABSOLUTE: 0.63 E9/L (ref 1.5–4)
LYMPHOCYTES RELATIVE PERCENT: 6 % (ref 20–42)
MCH RBC QN AUTO: 29.7 PG (ref 26–35)
MCHC RBC AUTO-ENTMCNC: 32.2 % (ref 32–34.5)
MCV RBC AUTO: 92.5 FL (ref 80–99.9)
METAMYELOCYTES RELATIVE PERCENT: 3 % (ref 0–1)
MONOCYTES ABSOLUTE: 0.74 E9/L (ref 0.1–0.95)
MONOCYTES RELATIVE PERCENT: 7 % (ref 2–12)
MYELOCYTE PERCENT: 3 % (ref 0–0)
NEUTROPHILS ABSOLUTE: 9.14 E9/L (ref 1.8–7.3)
NEUTROPHILS RELATIVE PERCENT: 81 % (ref 43–80)
OVALOCYTES: ABNORMAL
PDW BLD-RTO: 17.9 FL (ref 11.5–15)
PLATELET # BLD: 96 E9/L (ref 130–450)
PLATELET CONFIRMATION: NORMAL
PMV BLD AUTO: 10.7 FL (ref 7–12)
POIKILOCYTES: ABNORMAL
POLYCHROMASIA: ABNORMAL
POTASSIUM REFLEX MAGNESIUM: 3.6 MMOL/L (ref 3.5–5)
PROCALCITONIN: 0.74 NG/ML (ref 0–0.08)
RBC # BLD: 2.79 E12/L (ref 3.8–5.8)
SCHISTOCYTES: ABNORMAL
SODIUM BLD-SCNC: 138 MMOL/L (ref 132–146)
TEAR DROP CELLS: ABNORMAL
TOTAL PROTEIN: 5.9 G/DL (ref 6.4–8.3)
TOXIC GRANULATION: ABNORMAL
WBC # BLD: 10.5 E9/L (ref 4.5–11.5)

## 2021-09-29 PROCEDURE — 36415 COLL VENOUS BLD VENIPUNCTURE: CPT

## 2021-09-29 PROCEDURE — 2580000003 HC RX 258: Performed by: INTERNAL MEDICINE

## 2021-09-29 PROCEDURE — 6360000002 HC RX W HCPCS: Performed by: INTERNAL MEDICINE

## 2021-09-29 PROCEDURE — 2580000003 HC RX 258: Performed by: FAMILY MEDICINE

## 2021-09-29 PROCEDURE — 94664 DEMO&/EVAL PT USE INHALER: CPT

## 2021-09-29 PROCEDURE — 6370000000 HC RX 637 (ALT 250 FOR IP): Performed by: FAMILY MEDICINE

## 2021-09-29 PROCEDURE — 1200000000 HC SEMI PRIVATE

## 2021-09-29 PROCEDURE — 84145 PROCALCITONIN (PCT): CPT

## 2021-09-29 PROCEDURE — 85025 COMPLETE CBC W/AUTO DIFF WBC: CPT

## 2021-09-29 PROCEDURE — 36591 DRAW BLOOD OFF VENOUS DEVICE: CPT

## 2021-09-29 PROCEDURE — 80053 COMPREHEN METABOLIC PANEL: CPT

## 2021-09-29 PROCEDURE — 6360000002 HC RX W HCPCS: Performed by: FAMILY MEDICINE

## 2021-09-29 PROCEDURE — 94640 AIRWAY INHALATION TREATMENT: CPT

## 2021-09-29 RX ORDER — LOPERAMIDE HYDROCHLORIDE 2 MG/1
2 CAPSULE ORAL 4 TIMES DAILY PRN
Status: DISCONTINUED | OUTPATIENT
Start: 2021-09-29 | End: 2021-09-30 | Stop reason: HOSPADM

## 2021-09-29 RX ORDER — SENNA AND DOCUSATE SODIUM 50; 8.6 MG/1; MG/1
2 TABLET, FILM COATED ORAL DAILY
Status: DISCONTINUED | OUTPATIENT
Start: 2021-09-29 | End: 2021-09-29

## 2021-09-29 RX ADMIN — SODIUM CHLORIDE, PRESERVATIVE FREE 10 ML: 5 INJECTION INTRAVENOUS at 08:03

## 2021-09-29 RX ADMIN — FERROUS SULFATE TAB 325 MG (65 MG ELEMENTAL FE) 325 MG: 325 (65 FE) TAB at 08:03

## 2021-09-29 RX ADMIN — LOSARTAN POTASSIUM 100 MG: 50 TABLET, FILM COATED ORAL at 08:03

## 2021-09-29 RX ADMIN — APIXABAN 2.5 MG: 2.5 TABLET, FILM COATED ORAL at 08:03

## 2021-09-29 RX ADMIN — SUCRALFATE 1 G: 1 TABLET ORAL at 06:09

## 2021-09-29 RX ADMIN — AMIODARONE HYDROCHLORIDE 100 MG: 200 TABLET ORAL at 08:03

## 2021-09-29 RX ADMIN — IPRATROPIUM BROMIDE 0.5 MG: 0.5 SOLUTION RESPIRATORY (INHALATION) at 08:35

## 2021-09-29 RX ADMIN — BUDESONIDE 250 MCG: 0.25 SUSPENSION RESPIRATORY (INHALATION) at 08:35

## 2021-09-29 RX ADMIN — ARFORMOTEROL TARTRATE 15 MCG: 15 SOLUTION RESPIRATORY (INHALATION) at 20:32

## 2021-09-29 RX ADMIN — SUCRALFATE 1 G: 1 TABLET ORAL at 00:15

## 2021-09-29 RX ADMIN — BUDESONIDE 250 MCG: 0.25 SUSPENSION RESPIRATORY (INHALATION) at 20:32

## 2021-09-29 RX ADMIN — APIXABAN 2.5 MG: 2.5 TABLET, FILM COATED ORAL at 21:17

## 2021-09-29 RX ADMIN — CEFEPIME 2000 MG: 2 INJECTION, POWDER, FOR SOLUTION INTRAVENOUS at 11:58

## 2021-09-29 RX ADMIN — FERROUS SULFATE TAB 325 MG (65 MG ELEMENTAL FE) 325 MG: 325 (65 FE) TAB at 16:31

## 2021-09-29 RX ADMIN — CEFEPIME 2000 MG: 2 INJECTION, POWDER, FOR SOLUTION INTRAVENOUS at 03:34

## 2021-09-29 RX ADMIN — IPRATROPIUM BROMIDE 0.5 MG: 0.5 SOLUTION RESPIRATORY (INHALATION) at 18:02

## 2021-09-29 RX ADMIN — ARFORMOTEROL TARTRATE 15 MCG: 15 SOLUTION RESPIRATORY (INHALATION) at 08:35

## 2021-09-29 RX ADMIN — LOPERAMIDE HYDROCHLORIDE 2 MG: 2 CAPSULE ORAL at 15:28

## 2021-09-29 RX ADMIN — IPRATROPIUM BROMIDE 0.5 MG: 0.5 SOLUTION RESPIRATORY (INHALATION) at 20:32

## 2021-09-29 RX ADMIN — ATORVASTATIN CALCIUM 10 MG: 10 TABLET, FILM COATED ORAL at 08:03

## 2021-09-29 ASSESSMENT — PAIN SCALES - GENERAL
PAINLEVEL_OUTOF10: 0

## 2021-09-29 NOTE — PLAN OF CARE
Problem: Falls - Risk of:  Goal: Will remain free from falls  Description: Will remain free from falls  9/28/2021 1608 by Roberth Roberts RN  Outcome: Met This Shift  Goal: Absence of physical injury  Description: Absence of physical injury  9/28/2021 2255 by Cholo Porter RN  Outcome: Met This Shift  9/28/2021 1608 by Roberth Roberts RN  Outcome: Met This Shift     Problem: Skin Integrity:  Goal: Will show no infection signs and symptoms  Description: Will show no infection signs and symptoms  9/28/2021 2255 by Cholo Porter RN  Outcome: Met This Shift  9/28/2021 1608 by Roberth Roberts RN  Outcome: Met This Shift  Goal: Absence of new skin breakdown  Description: Absence of new skin breakdown  9/28/2021 2255 by Cholo Porter RN  Outcome: Met This Shift  9/28/2021 1608 by Roberth Roberts RN  Outcome: Met This Shift     Problem: Malnutrition  (NI-5.2)  Goal: Food and/or Nutrient Delivery  Description: Individualized approach for food/nutrient provision.   9/28/2021 1152 by Peter Chamberlain RD, LD  Outcome: Met This Shift

## 2021-09-29 NOTE — PROGRESS NOTES
[facility] PROGRESS NOTE      Chief complaint: Follow-up of fever    The patient is a 80 y.o. male with history of hypertension, hyperlipidemia, COPD, left upper lobe lung adenocarcinoma diagnosed in 04/2020 s/p left upper lobectomy and left lower lobe wedge resection in 02/1919 complicated by recurrent disease on chemoradiation therapy with carboplatin and paclitaxel from 07/2021-09/2021, presented on 09/24 with hypotension, accompanied by increasing fatigue and weakness for 2 days later noted to be lethargic then found to be hypotensive. He reports having liquid stools since admission. On admission, he had intermittent low-grade fever up to 100.8 °F with leukocytopenia of 2200 (present since 08/2021), elevated procalcitonin of 4.56 ng/mL. SARS-CoV-2 PCR was not detected. Urine Streptococcus pneumoniae and Legionella antigens were negative. Blood cultures showed no growth to date. Chest CTA showed status post left upper lobectomy with large cavity in the left upper lung unchanged from that in 08/2021, multifocal interstitial coarsening and in the lower lungs bilaterally in the left lower lung with surgical sutures unchanged from that in 08/2021, stable lobulated nodular mass approximately 1.2 cm in the area of the lingula, pleural-based densities likely pleuroparenchymal scarring in the lower lungs bilaterally unchanged from that in 08/2021, nonspecific hilar and mediastinal nodes, Mediport in situ. CT abdomen and pelvis showed mild diverticulosis, heterogenous and enlarged prostate, subtle area of sclerosis in the left iliac wing probably developing metastatic disease. Stool C difficile toxin PCR was negative. Cefepime and vancomycin were started on admission. Subjective: Patient was seen and examined. No chills, no abdominal pain, has diarrhea, no rash, no itching. He reports feeling better.       Objective:  /65   Pulse 71   Temp 98.8 °F (37.1 °C) (Temporal)   Resp 16   Ht 5' 9\" (1.753 m)   Wt 147 lb (66.7 kg)   SpO2 97%   BMI 21.71 kg/m²   Constitutional: Alert, not in distress  Respiratory: Clear breath sounds, no crackles, no wheezes  Cardiovascular: Regular rate and rhythm, no murmurs  Gastrointestinal: Bowel sounds present, soft, nontender  Skin: Warm and dry, no active dermatoses. Right chest wall venous access port with no surrounding erythema and tenderness. Musculoskeletal: No joint swelling, no joint erythema    Labs, imaging, and medical records/notes were personally reviewed. Assessment:  SIRS, suspect sepsis, etiology unclear  Immunocompromised state  Left upper lobe lung adenocarcinoma diagnosed in 04/2020 s/p left upper lobectomy and left lower lobe wedge resection in 79/0827 complicated by recurrent disease on chemoradiation therapy with carboplatin and paclitaxel     Recommendations:  Stop  cefepime. Monitor clinically off antibiotics for now. Consider removing venous access port if no longer indicated, as per patient's request as well. Continue supportive care.     Thank you for involving me in the care of Mary Clay. I will continue to follow. Please do not hesitate to call for any questions or concerns.     Electronically signed by Jacques Leahy MD on 9/29/2021 at 10:57 AM

## 2021-09-29 NOTE — PROGRESS NOTES
Hospitalist Progress Note      SYNOPSIS: Patient admitted on 2021 for Hypotension    Patient presented to the emergency department with fatigue, weakness and low blood pressure. This is been worsening over the last couple of days. Family states that this morning the patient was lethargic. They checked his blood pressure and it was 90/40. Patient also reports some mild abdominal pain. Vital signs were assessed on arrival and he had a temperature of 100.7, heart rate of 114 and a blood pressure of 91/42. Laboratory studies reveal creatinine 1.5, hemoglobin 9.0. COVID-19 negative. CT chest and abdomen pelvis showed no acute findings. Emergency department personnel empirically cover the patient with vancomycin and cefepime. SUBJECTIVE:    Patient seen and examined  Records reviewed. The pt states that he feels much better        Temp (24hrs), Av.9 °F (36.6 °C), Min:97 °F (36.1 °C), Max:99 °F (37.2 °C)    DIET: ADULT DIET; Regular  Adult Oral Nutrition Supplement; Standard High Calorie/High Protein Oral Supplement  Adult Oral Nutrition Supplement; Fortified Pudding Oral Supplement  CODE: Full Code    Intake/Output Summary (Last 24 hours) at 2021 0727  Last data filed at 2021 1833  Gross per 24 hour   Intake 835.96 ml   Output 700 ml   Net 135.96 ml       OBJECTIVE:    /65   Pulse 71   Temp 98.8 °F (37.1 °C) (Temporal)   Resp 16   Ht 5' 9\" (1.753 m)   Wt 147 lb (66.7 kg)   SpO2 97%   BMI 21.71 kg/m²     General appearance: No apparent distress, appears stated age and cooperative. HEENT:  Conjunctivae/corneas clear. Neck: Supple. No jugular venous distention. Respiratory: Diminished. Some Ronchi  Cardiovascular: Regular rate rhythm, normal S1-S2  Abdomen: Soft, nontender, nondistended  Musculoskeletal: No clubbing, cyanosis, no bilateral lower extremity edema. Brisk capillary refill.    Skin:  No rashes  on visible skin  Neurologic: awake, alert and following commands  140   K 3.2* 3.4*   * 109*   CO2 18* 21*   BUN 12 8   CREATININE 1.0 1.0   CALCIUM 8.0* 8.7       Recent Labs     09/27/21  0641 09/28/21  0559   PROT 5.4* 5.2*   ALKPHOS 43 42   ALT 18 16   AST 33 27   BILITOT 0.3 0.4       No results for input(s): INR in the last 72 hours. No results for input(s): Cherre Gash in the last 72 hours. Chronic labs:    Lab Results   Component Value Date    CHOL 160 03/16/2021    TRIG 69 03/16/2021    HDL 64 03/16/2021    LDLCALC 82 03/16/2021    INR 1.1 06/17/2021    LABA1C 4.8 09/14/2020       Radiology: REVIEWED DAILY    +++++++++++++++++++++++++++++++++++++++++++++++++  Esther Rodriguez MD  [employer] - [address] [place], [place]  +++++++++++++++++++++++++++++++++++++++++++++++++  NOTE: This report was transcribed using voice recognition software. Every effort was made to ensure accuracy; however, inadvertent computerized transcription errors may be present.

## 2021-09-29 NOTE — PROGRESS NOTES
Called RN to RN re: transfer to new room 8402A. All pertinent information relayed, vital signs reviewed, all questions answered. Son states he will call his daughter to let her know of the new room number.        Robles Webb RN

## 2021-09-29 NOTE — PROGRESS NOTES
Today WBC 10.5, hemoglobin 8.3, platelets 96. We had started him on Granix for the neutropenia. Discontinue Granix today. He was admitted with sepsis. Continue care as per primary team.  He has a history of lung cancer and completed recent concurrent chemotherapy and radiation in September 2021. Small lesion left iliac wing and that can be followed with an MRI as an outpatient.

## 2021-09-30 VITALS
HEIGHT: 69 IN | DIASTOLIC BLOOD PRESSURE: 62 MMHG | SYSTOLIC BLOOD PRESSURE: 125 MMHG | TEMPERATURE: 97.9 F | BODY MASS INDEX: 21.77 KG/M2 | RESPIRATION RATE: 16 BRPM | WEIGHT: 147 LBS | OXYGEN SATURATION: 97 % | HEART RATE: 73 BPM

## 2021-09-30 LAB
ALBUMIN SERPL-MCNC: 3.3 G/DL (ref 3.5–5.2)
ALP BLD-CCNC: 60 U/L (ref 40–129)
ALT SERPL-CCNC: 35 U/L (ref 0–40)
ANION GAP SERPL CALCULATED.3IONS-SCNC: 10 MMOL/L (ref 7–16)
ANISOCYTOSIS: ABNORMAL
AST SERPL-CCNC: 36 U/L (ref 0–39)
BASOPHILS ABSOLUTE: 0.09 E9/L (ref 0–0.2)
BASOPHILS RELATIVE PERCENT: 0.9 % (ref 0–2)
BILIRUB SERPL-MCNC: 0.3 MG/DL (ref 0–1.2)
BLOOD CULTURE, ROUTINE: NORMAL
BUN BLDV-MCNC: 6 MG/DL (ref 6–23)
CALCIUM SERPL-MCNC: 8.7 MG/DL (ref 8.6–10.2)
CHLORIDE BLD-SCNC: 109 MMOL/L (ref 98–107)
CO2: 24 MMOL/L (ref 22–29)
CREAT SERPL-MCNC: 0.9 MG/DL (ref 0.7–1.2)
CULTURE, BLOOD 2: NORMAL
EOSINOPHILS ABSOLUTE: 0.09 E9/L (ref 0.05–0.5)
EOSINOPHILS RELATIVE PERCENT: 0.9 % (ref 0–6)
GFR AFRICAN AMERICAN: >60
GFR NON-AFRICAN AMERICAN: >60 ML/MIN/1.73
GLUCOSE BLD-MCNC: 93 MG/DL (ref 74–99)
HCT VFR BLD CALC: 24.5 % (ref 37–54)
HEMOGLOBIN: 8 G/DL (ref 12.5–16.5)
LYMPHOCYTES ABSOLUTE: 1.2 E9/L (ref 1.5–4)
LYMPHOCYTES RELATIVE PERCENT: 12.1 % (ref 20–42)
MAGNESIUM: 1.7 MG/DL (ref 1.6–2.6)
MCH RBC QN AUTO: 30 PG (ref 26–35)
MCHC RBC AUTO-ENTMCNC: 32.7 % (ref 32–34.5)
MCV RBC AUTO: 91.8 FL (ref 80–99.9)
MONOCYTES ABSOLUTE: 0.3 E9/L (ref 0.1–0.95)
MONOCYTES RELATIVE PERCENT: 2.6 % (ref 2–12)
MYELOCYTE PERCENT: 1.7 % (ref 0–0)
NEUTROPHILS ABSOLUTE: 8.4 E9/L (ref 1.8–7.3)
NEUTROPHILS RELATIVE PERCENT: 81.9 % (ref 43–80)
OVALOCYTES: ABNORMAL
PDW BLD-RTO: 17.9 FL (ref 11.5–15)
PLATELET # BLD: 109 E9/L (ref 130–450)
PMV BLD AUTO: 10.9 FL (ref 7–12)
POIKILOCYTES: ABNORMAL
POLYCHROMASIA: ABNORMAL
POTASSIUM REFLEX MAGNESIUM: 3.4 MMOL/L (ref 3.5–5)
RBC # BLD: 2.67 E12/L (ref 3.8–5.8)
SODIUM BLD-SCNC: 143 MMOL/L (ref 132–146)
TEAR DROP CELLS: ABNORMAL
TOTAL PROTEIN: 5.3 G/DL (ref 6.4–8.3)
TOXIC GRANULATION: ABNORMAL
WBC # BLD: 10 E9/L (ref 4.5–11.5)

## 2021-09-30 PROCEDURE — 80053 COMPREHEN METABOLIC PANEL: CPT

## 2021-09-30 PROCEDURE — 92526 ORAL FUNCTION THERAPY: CPT | Performed by: SPEECH-LANGUAGE PATHOLOGIST

## 2021-09-30 PROCEDURE — 6370000000 HC RX 637 (ALT 250 FOR IP): Performed by: FAMILY MEDICINE

## 2021-09-30 PROCEDURE — 92610 EVALUATE SWALLOWING FUNCTION: CPT | Performed by: SPEECH-LANGUAGE PATHOLOGIST

## 2021-09-30 PROCEDURE — 83735 ASSAY OF MAGNESIUM: CPT

## 2021-09-30 PROCEDURE — 6360000002 HC RX W HCPCS: Performed by: FAMILY MEDICINE

## 2021-09-30 PROCEDURE — 05H533Z INSERTION OF INFUSION DEVICE INTO RIGHT SUBCLAVIAN VEIN, PERCUTANEOUS APPROACH: ICD-10-PCS | Performed by: INTERNAL MEDICINE

## 2021-09-30 PROCEDURE — 85025 COMPLETE CBC W/AUTO DIFF WBC: CPT

## 2021-09-30 PROCEDURE — 2580000003 HC RX 258: Performed by: FAMILY MEDICINE

## 2021-09-30 PROCEDURE — 36415 COLL VENOUS BLD VENIPUNCTURE: CPT

## 2021-09-30 RX ORDER — HEPARIN SODIUM (PORCINE) LOCK FLUSH IV SOLN 100 UNIT/ML 100 UNIT/ML
500 SOLUTION INTRAVENOUS PRN
Status: DISCONTINUED | OUTPATIENT
Start: 2021-09-30 | End: 2021-09-30 | Stop reason: HOSPADM

## 2021-09-30 RX ADMIN — SODIUM CHLORIDE: 9 INJECTION, SOLUTION INTRAVENOUS at 08:10

## 2021-09-30 RX ADMIN — ACETAMINOPHEN 650 MG: 325 TABLET ORAL at 09:26

## 2021-09-30 RX ADMIN — SUCRALFATE 1 G: 1 TABLET ORAL at 14:06

## 2021-09-30 RX ADMIN — APIXABAN 2.5 MG: 2.5 TABLET, FILM COATED ORAL at 08:10

## 2021-09-30 RX ADMIN — ATORVASTATIN CALCIUM 10 MG: 10 TABLET, FILM COATED ORAL at 08:10

## 2021-09-30 RX ADMIN — FERROUS SULFATE TAB 325 MG (65 MG ELEMENTAL FE) 325 MG: 325 (65 FE) TAB at 17:09

## 2021-09-30 RX ADMIN — SUCRALFATE 1 G: 1 TABLET ORAL at 17:09

## 2021-09-30 RX ADMIN — SUCRALFATE 1 G: 1 TABLET ORAL at 09:23

## 2021-09-30 RX ADMIN — Medication 500 UNITS: at 14:58

## 2021-09-30 RX ADMIN — ACETAMINOPHEN 650 MG: 325 TABLET ORAL at 17:09

## 2021-09-30 RX ADMIN — FERROUS SULFATE TAB 325 MG (65 MG ELEMENTAL FE) 325 MG: 325 (65 FE) TAB at 08:10

## 2021-09-30 RX ADMIN — LOSARTAN POTASSIUM 100 MG: 50 TABLET, FILM COATED ORAL at 08:09

## 2021-09-30 RX ADMIN — LOPERAMIDE HYDROCHLORIDE 2 MG: 2 CAPSULE ORAL at 09:26

## 2021-09-30 RX ADMIN — AMIODARONE HYDROCHLORIDE 100 MG: 200 TABLET ORAL at 08:10

## 2021-09-30 ASSESSMENT — PAIN SCALES - GENERAL
PAINLEVEL_OUTOF10: 5
PAINLEVEL_OUTOF10: 3
PAINLEVEL_OUTOF10: 5
PAINLEVEL_OUTOF10: 0

## 2021-09-30 NOTE — PROGRESS NOTES
Hospitalist Progress Note      SYNOPSIS: Patient admitted on 2021 for Hypotension    Patient presented to the emergency department with fatigue, weakness and low blood pressure. This is been worsening over the last couple of days. Family states that this morning the patient was lethargic. They checked his blood pressure and it was 90/40. Patient also reports some mild abdominal pain. Vital signs were assessed on arrival and he had a temperature of 100.7, heart rate of 114 and a blood pressure of 91/42. Laboratory studies reveal creatinine 1.5, hemoglobin 9.0. COVID-19 negative. CT chest and abdomen pelvis showed no acute findings. Emergency department personnel empirically cover the patient with vancomycin and cefepime. SUBJECTIVE:    Patient seen and examined  Records reviewed. Patient reports he is complaining of some dysphagia this morning  Nurse reports the same  Afebrile  Antibiotics stopped yesterday        Temp (24hrs), Av.1 °F (36.7 °C), Min:97.9 °F (36.6 °C), Max:98.5 °F (36.9 °C)    DIET: ADULT DIET; Regular  Adult Oral Nutrition Supplement; Standard High Calorie/High Protein Oral Supplement  Adult Oral Nutrition Supplement; Fortified Pudding Oral Supplement  CODE: Full Code    Intake/Output Summary (Last 24 hours) at 2021 1037  Last data filed at 2021 1004  Gross per 24 hour   Intake 0 ml   Output --   Net 0 ml       OBJECTIVE:    /62   Pulse 73   Temp 97.9 °F (36.6 °C) (Oral)   Resp 16   Ht 5' 9\" (1.753 m)   Wt 147 lb (66.7 kg)   SpO2 97%   BMI 21.71 kg/m²     General appearance: No apparent distress, appears stated age and cooperative. HEENT:  Conjunctivae/corneas clear. Neck: Supple. No jugular venous distention. Respiratory: Diminished. Some Ronchi  Cardiovascular: Regular rate rhythm, normal S1-S2  Abdomen: Soft, nontender, nondistended  Musculoskeletal: No clubbing, cyanosis, no bilateral lower extremity edema. Brisk capillary refill.    Skin:  No rashes  on visible skin  Neurologic: awake, alert and following commands     ASSESSMENT:    Sepsis Unclear source  Acute Renal failure  History of colon cancer  COPD  HTN  HLD  History of lung cancer status post radiation  History of adenocarcinoma left upper lobe, status post resection  Pancytopenia  Paroxysmal A. fib     PLAN:    Stop antibiotic 9/29 per ID-monitor off-ID appreciated  Urinalysis and urine cultures no growth  Blood cultures shows no growth. ID consultation appreciated  Renal functions improved-ALBAN resolved  Hem-Onc consult apprecaited. MRI of the pelvis for possible metastatic disease as outpatient? Pancytopenia and monitor ANC   Cont. Amiodarone. Granix for heme-onc-keep port for now  Continue Eliquis  Continue losartan and atorvastatin  Continue breathing treatments  SLP evaluation at bedside.   Patient has history of radiation      DISPOSITION: Discharge planning pending ID recommendation    Medications:  REVIEWED DAILY    Infusion Medications    sodium chloride      sodium chloride 75 mL/hr at 09/30/21 0810     Scheduled Medications    sodium chloride   IntraVENous Q8H    amiodarone  100 mg Oral Daily    apixaban  2.5 mg Oral BID    atorvastatin  10 mg Oral Daily    losartan  100 mg Oral Daily    sucralfate  1 g Oral 4 times per day    sodium chloride flush  10 mL IntraVENous 2 times per day    ferrous sulfate  325 mg Oral BID WC    budesonide  0.25 mg Nebulization BID    And    ipratropium  0.5 mg Nebulization 4x daily    And    Arformoterol Tartrate  15 mcg Nebulization BID    sodium chloride   IntraVENous Q12H     PRN Meds: loperamide, potassium chloride **OR** potassium alternative oral replacement **OR** potassium chloride, albuterol, sodium chloride flush, sodium chloride, promethazine **OR** ondansetron, polyethylene glycol, acetaminophen **OR** acetaminophen    Labs:     Recent Labs     09/28/21  0559 09/29/21  0609 09/30/21  0520   WBC 5.5 10.5 10.0   HGB 8.0* 8.3* 8.0*

## 2021-09-30 NOTE — PROGRESS NOTES
[facility] PROGRESS NOTE      Chief complaint: Follow-up of fever    The patient is a 80 y.o. male with history of hypertension, hyperlipidemia, COPD, left upper lobe lung adenocarcinoma diagnosed in 04/2020 s/p left upper lobectomy and left lower lobe wedge resection in 65/9302 complicated by recurrent disease on chemoradiation therapy with carboplatin and paclitaxel from 07/2021-09/2021, presented on 09/24 with hypotension, accompanied by increasing fatigue and weakness for 2 days later noted to be lethargic then found to be hypotensive. He reports having liquid stools since admission. On admission, he had intermittent low-grade fever up to 100.8 °F with leukocytopenia of 2200 (present since 08/2021), elevated procalcitonin of 4.56 ng/mL. SARS-CoV-2 PCR was not detected. Urine Streptococcus pneumoniae and Legionella antigens were negative. Blood cultures showed no growth to date. Chest CTA showed status post left upper lobectomy with large cavity in the left upper lung unchanged from that in 08/2021, multifocal interstitial coarsening and in the lower lungs bilaterally in the left lower lung with surgical sutures unchanged from that in 08/2021, stable lobulated nodular mass approximately 1.2 cm in the area of the lingula, pleural-based densities likely pleuroparenchymal scarring in the lower lungs bilaterally unchanged from that in 08/2021, nonspecific hilar and mediastinal nodes, Mediport in situ. CT abdomen and pelvis showed mild diverticulosis, heterogenous and enlarged prostate, subtle area of sclerosis in the left iliac wing probably developing metastatic disease. Stool C difficile toxin PCR was negative. Cefepime and vancomycin were started on admission. Subjective: Patient was seen and examined. No chills, no abdominal pain, has diarrhea, no rash, no itching. He reports feeling better.       Objective:  /62   Pulse 73   Temp 97.9 °F (36.6 °C) (Oral)   Resp 16   Ht 5' 9\" (1.753 m)   Wt 147 lb (66.7 kg)   SpO2 97%   BMI 21.71 kg/m²   Constitutional: Alert, not in distress  Respiratory: Clear breath sounds, no crackles, no wheezes  Cardiovascular: Regular rate and rhythm, no murmurs  Gastrointestinal: Bowel sounds present, soft, nontender  Skin: Warm and dry, no active dermatoses. Right chest wall venous access port with no surrounding erythema and tenderness. Musculoskeletal: No joint swelling, no joint erythema    Labs, imaging, and medical records/notes were personally reviewed. Assessment:  SIRS, suspect sepsis, etiology unclear  Immunocompromised state  Left upper lobe lung adenocarcinoma diagnosed in 04/2020 s/p left upper lobectomy and left lower lobe wedge resection in 31/9815 complicated by recurrent disease on chemoradiation therapy with carboplatin and paclitaxel     Recommendations:  Monitor clinically off antibiotics for now. Consider removing venous access port if no longer indicated, as per patient's request as well. Continue supportive care.     Thank you for involving me in the care of Melo Estes. I will sign off for now. Please do not hesitate to call for any questions, concerns, or new findings.     Electronically signed by Manuel Arredondo MD on 9/30/2021 at 12:59 PM

## 2021-09-30 NOTE — CARE COORDINATION
Social Work Discharge/Planning:    Discharge order noted. [address] notified liaison Alecia Gonzalez with [facility] of patient's discharge order. Patient's plan remains home with [facility]. Family to transport patient home today.     Dino Monahan, SARAH  [phone]

## 2021-09-30 NOTE — PROGRESS NOTES
SPEECH/LANGUAGE PATHOLOGY  CLINICAL ASSESSMENT OF SWALLOWING FUNCTION   and PLAN OF CARE  PATIENT NAME:  Thien Mendoza  (male)     MRN:  [id number]    :  1950  (80 y.o.)  STATUS:  Inpatient: Room 8402/8402-A    TODAY'S DATE:  21 1000   SLP swallowing-dysphagia evaluation and treatment Start: 21 1000, End: 21 1000, ONE TIME, Standing Count: 1 Occurrences, R    Esther Rodriguez MD  REASON FOR REFERRAL: food sticking   EVALUATING THERAPIST: SU Andrade                 ASSESSMENT:    DYSPHAGIA DIAGNOSIS:   Clinical indicators of swallow within functional limits for age/premorbid functioning and esophageal motility deficit suspected      DIET RECOMMENDATIONS:  Easy to chew consistency solids with  thin liquids     FEEDING RECOMMENDATIONS:     Assistance level:  No assistance needed      Compensatory strategies recommended: No strategies are recommended at this time      Discussed recommendations with nursing?: No secondary to no diet/liquid change recommended     SPEECH THERAPY  PLAN OF CARE   The dysphagia POC is established based on physician order, dysphagia diagnosis and results of clinical assessment     Dysphagia therapy is not recommended     Conditions Requiring Skilled Therapeutic Intervention for dysphagia:    not applicable    Specific dysphagia interventions to include:     Not applicable    Specific instructions for next treatment:  not applicable   Patient Treatment Goals:    Short Term Goals:  Not applicable no therapy warranted     Long Term Goals:   Not applicable no therapy warranted      Patient/family Goal:    not applicable                    ADMITTING DIAGNOSIS: Septicemia ([facility] [address] [A41.9]  ALBAN (acute kidney injury) ([address] [N17.9]  Sepsis ([address] [A41.9]    VISIT DIAGNOSIS:   Visit Diagnoses       Codes    Septicemia ([address]    -  Primary A41.9    ALBAN (acute kidney injury) ([address]     N17.9           PATIENT REPORT/COMPLAINT: feels food sticks, Pt points to area of upper esophagus; history of radiation      RN cleared patient for participation in assessment     yes     PRIOR LEVEL OF SWALLOW FUNCTION:    PAST HISTORY OF DYSPHAGIA?: none reported    Home diet: Regular consistency solids with  thin liquids  PROCEDURE:  Consistencies Administered During the Evaluation   Liquids: thin liquid   Solids:  pureed foods and soft solid foods      Method of Intake:   cup, straw, spoon  Self fed      Position:   Seated, upright    CLINICAL ASSESSMENT  Oral Stage: The oral stage of swallowing was within functional limits      Pharyngeal Stage:    No signs of aspiration were noted during this evaluation however, silent aspiration cannot be ruled out at bedside. If silent aspiration is suspected, a Videofluoroscopic Study of Swallowing (MBS) is recommended and requires a physician order. Cognition:   Within functional limits for this exam    Oral Peripheral Examination   Adequate lingual/labial strength     Current Respiratory Status    room air     Parameters of Speech Production  Respiration:  Adequate for speech production  Quality:   Within functional limits  Intensity: Within functional limits    Volitional Swallow: Present    Volitional Cough:    Present    Pain: No pain reported. EDUCATION:   The Speech Language Pathologist (SLP) completed education regarding results of evaluation and that intervention is not warranted at this time. Learner: Patient and Family  Education:  Reviewed results and recommendations of this evaluation, Reviewed diet and strategies, Reviewed signs, symptoms and risks of aspiration and Education Related to Potential Risks and Complications Due to Impairment/Illness/Injury  Evaluation of Education: Verbalizes understanding    This plan will be re-evaluated and revised in 1 week  if warranted.       CPT code:  26596  bedside swallow eval/ 98449 dysphagia therapy    Speech Pathologist (SLP) completed education with the patient/family regarding type of swallowing impairment. Reviewed current solid/liquid consistency diet recommendations and discussed compensatory strategies to ensure safe PO intake. Ongoing PO trials directed to assess for fatigue or further s/s of dysphagia/ aspiration without significant issues noted. Reviewed aspiration precautions. Encouraged patient and/or family to engage SLP in unstructured Q&A session relative to identified deficit areas; indicated understanding of all information provided via satisfactory verbal response. [x]The admitting diagnosis and active problem list, have been reviewed prior to initiation of this evaluation.         ACTIVE PROBLEM LIST:   Patient Active Problem List   Diagnosis    Non-small cell lung cancer ([address]    Atrial fibrillation ([address]    Gastroesophageal reflux disease without esophagitis    S/P lobectomy of lung    Essential hypertension    Hypercholesterolemia    Benign prostatic hyperplasia    Chest pain    Sepsis ([address]    Lung cancer, upper lobe ([address]    Severe protein-calorie malnutrition ([address]           Garcia Puff., CCC-SLP  Speech-Language Pathologist  [id number]  9/30/2021

## 2021-09-30 NOTE — PLAN OF CARE
Problem: Falls - Risk of:  Goal: Will remain free from falls  Description: Will remain free from falls  9/30/2021 1423 by Mark Puentes RN  Outcome: Completed  9/30/2021 0103 by Wesly Smart RN  Outcome: Met This Shift  Goal: Absence of physical injury  Description: Absence of physical injury  9/30/2021 1423 by Mark Puentes RN  Outcome: Completed  9/30/2021 0103 by Wesly Smart RN  Outcome: Met This Shift     Problem: Skin Integrity:  Goal: Will show no infection signs and symptoms  Description: Will show no infection signs and symptoms  9/30/2021 1423 by Mark Puentes RN  Outcome: Completed  9/30/2021 0103 by Wesly Smart RN  Outcome: Met This Shift  Goal: Absence of new skin breakdown  Description: Absence of new skin breakdown  9/30/2021 1423 by Mark Puentes RN  Outcome: Completed  9/30/2021 0103 by Wesly Smart RN  Outcome: Met This Shift

## 2021-09-30 NOTE — PLAN OF CARE
Problem: Falls - Risk of:  Goal: Will remain free from falls  Description: Will remain free from falls  9/30/2021 0103 by Duran Sandoval RN  Outcome: Met This Shift     Problem: Falls - Risk of:  Goal: Absence of physical injury  Description: Absence of physical injury  9/30/2021 0103 by Duran Sandoval RN  Outcome: Met This Shift     Problem: Skin Integrity:  Goal: Absence of new skin breakdown  Description: Absence of new skin breakdown  9/30/2021 0103 by Duran Sandoval RN  Outcome: Met This Shift

## 2021-10-01 ENCOUNTER — TELEPHONE (OUTPATIENT)
Dept: PRIMARY CARE CLINIC | Age: 71
End: 2021-10-01

## 2021-10-01 NOTE — TELEPHONE ENCOUNTER
[facility] Transitions Initial Follow Up Call    Outreach made within 2 business days of discharge: Yes    Patient: Jolynn Arellano Patient : 1950   MRN: [id number]  Reason for Admission: Sepsis(HCC)  Discharge Date: 21       Spoke with: Roger Conrad    Discharge department/facility: [facility]    TCM Interactive Patient Contact:  Was patient able to fill all prescriptions: Yes  Was patient instructed to bring all medications to the follow-up visit: Yes  Is patient taking all medications as directed in the discharge summary?  Yes  Does patient understand their discharge instructions: Yes  Does patient have questions or concerns that need addressed prior to 7-14 day follow up office visit: no    Scheduled appointment with PCP within 7-14 days    Follow Up  Future Appointments   Date Time Provider Jose Sheikh   10/12/2021 11:45 AM DO Iza Jones [facility]   10/18/2021  2:00 PM Kelsey Garcia APRN - CNP [facility] [address]

## 2021-10-01 NOTE — DISCHARGE SUMMARY
Hospitalist Discharge Summary    Patient ID: Maggy Kwok   Patient : 1950  Patient's PCP: Carmen Levine DO    Admit Date: 2021   Admitting Physician: Mary Kate Miguel DO    Discharge Date:  10/1/2021   Discharge Physician: Hafsa Turk MD   Discharge Condition: Stable  Discharge Disposition: [facility] course in brief:  (Please refer to daily progress notes for a comprehensive review of the hospitalization by requesting medical records)    Patient presented to the emergency department with fatigue, weakness and low blood pressure.  This is been worsening over the last couple of days.  Family states that this morning the patient was lethargic. Alfredo Polo checked his blood pressure and it was 90/40.  Patient also reports some mild abdominal pain.  Vital signs were assessed on arrival and he had a temperature of 100.7, heart rate of 114 and a blood pressure of 91/42.  Laboratory studies reveal creatinine 1.5, hemoglobin 9.0.  COVID-19 negative.  CT chest and abdomen pelvis showed no acute findings.  Emergency department personnel empirically cover the patient with vancomycin and cefepime    Hospital course  ID consulted. Patient was monitored on IV cefepime. Was monitored off antibiotics clinically for 24 hours and did well. Patient complained of dysphagia on the day of discharge. Patient had his swallow evaluation at bedside and was recommended easy to chew consistency solids with thin liquids. Patient also seen by heme oncology and was given Granix. ALBAN was monitored, resolved with IV fluids. Recommended outpatient MRI of the pelvis for possible metastatic disease by oncology.   Patient was discharged in stable condition with [place]    Consults:   IP CONSULT TO INTERNAL MEDICINE  IP CONSULT TO HEM/ONC  IP CONSULT TO INFECTIOUS DISEASES  IP CONSULT TO HOME CARE NEEDS    Discharge Diagnoses:    Sepsis Unclear source - all cultures negative  Acute Renal failure resolved  History of colon cancer  COPD  HTN  HLD  History of lung cancer status post radiation  History of adenocarcinoma left upper lobe, status post resection  Pancytopenia  Paroxysmal A. fib    Discharge Instructions / Follow up:    Future Appointments   Date Time Provider Jose Sheikh   10/12/2021 11:45 AM DO Iza Madrigal [facility]   10/18/2021  2:00 PM Douglas Blackburn, APRN - CNP [facility]     Continued appropriate risk factor modification of blood pressure, diabetes and serum lipids will remain essential to reducing risk of future atherosclerotic development    Activity: activity as tolerated    Significant labs:  CBC:   Recent Labs     09/29/21  0609 09/30/21  0520   WBC 10.5 10.0   RBC 2.79* 2.67*   HGB 8.3* 8.0*   HCT 25.8* 24.5*   MCV 92.5 91.8   RDW 17.9* 17.9*   PLT 96* 109*     BMP:   Recent Labs     09/29/21  0609 09/30/21  0520    143   K 3.6 3.4*    109*   CO2 21* 24   BUN 6 6   CREATININE 0.9 0.9   MG  --  1.7     LFT:  Recent Labs     09/29/21  0609 09/30/21  0520   PROT 5.9* 5.3*   ALKPHOS 54 60   ALT 28 35   AST 38 36   BILITOT 0.6 0.3     PT/INR: No results for input(s): INR, APTT in the last 72 hours. BNP: No results for input(s): BNP in the last 72 hours.   Hgb A1C:   Lab Results   Component Value Date    LABA1C 4.8 09/14/2020     Folate and B12: No results found for: Zhen Hopping, No results found for: FOLATE  Thyroid Studies: No results found for: TSH, U6DODLQ, F8DIETR, THYROIDAB    Urinalysis:    Lab Results   Component Value Date    NITRU Negative 08/23/2021    BLOODU Negative 08/23/2021    SPECGRAV 1.015 08/23/2021    GLUCOSEU Negative 08/23/2021       Imaging:  XR CHEST (2 VW)    Result Date: 9/24/2021  EXAMINATION: TWO XRAY VIEWS OF THE CHEST 9/24/2021 11:41 pm COMPARISON: 08/23/2021 HISTORY: ORDERING SYSTEM PROVIDED HISTORY: Fever, sepsis TECHNOLOGIST PROVIDED HISTORY: Reason for exam:->fever, sepsis What reading provider will be dictating this exam?->CRC FINDINGS: Right-sided Port-A-Cath remains in place. Heart size is normal. Postoperative changes are present in the left mid chest with suture material. Superior to this is a large bleb or loculated pneumothorax appearing unchanged. Loculated pneumothorax favored. Minimal areas of scarring or atelectasis in the left mid lung. No significant change. Continued follow-up recommended. Please see report from recent chest CT dated 08/23/2021. CT ABDOMEN PELVIS W IV CONTRAST Additional Contrast? None    Result Date: 9/25/2021  EXAMINATION: CT OF THE ABDOMEN AND PELVIS WITH CONTRAST 9/25/2021 12:43 am TECHNIQUE: CT of the abdomen and pelvis was performed with the administration of intravenous contrast. Multiplanar reformatted images are provided for review. Dose modulation, iterative reconstruction, and/or weight based adjustment of the mA/kV was utilized to reduce the radiation dose to as low as reasonably achievable. COMPARISON: 08/23/2021 HISTORY: ORDERING SYSTEM PROVIDED HISTORY: abd pain TECHNOLOGIST PROVIDED HISTORY: Additional Contrast?->None Reason for exam:->abd pain Decision Support Exception - unselect if not a suspected or confirmed emergency medical condition->Emergency Medical Condition (MA) What reading provider will be dictating this exam?->CRC FINDINGS: Scattered tiny low-attenuation foci in the liver are unchanged, too small for definitive characterization but favor small cysts. Gallbladder is unremarkable. Spleen is near the upper limits of normal for size but unchanged. Pancreas is unremarkable. No adrenal mass. No hydronephrosis. Small bilateral extrarenal pelvis. Assessment of bowel is limited without oral contrast.  Small hiatal hernia. No bowel obstruction. Appendix is normal.  Mild diverticulosis without evidence of acute diverticulitis. Scattered vascular calcifications. Urinary bladder is unremarkable. Heterogeneous enlarged prostate gland.  Calcifications in the pelvis are most consistent with phleboliths. No free fluid, free air or localized fluid collection. Please see separate dictated report for CT chest performed at the same time. Degenerative changes are present in the spine and pelvis. There is a faint area of sclerosis in the left iliac wing, best seen on series 4 image 149. This is slightly more conspicuous than previous and measures 8 mm. Mild diverticulosis. Heterogeneous enlarged prostate. Subtle area of sclerosis in the left iliac wing is more conspicuous than previous. Developing metastatic disease is not excluded and follow-up within 3 months is recommended. This could also be further assessed with MRI as indicated clinically. Other chronic appearing findings. CTA PULMONARY W CONTRAST    Result Date: 9/25/2021  EXAMINATION: CTA OF THE CHEST 9/24/2021 11:43 pm TECHNIQUE: CTA of the chest was performed after the administration of intravenous contrast.  Multiplanar reformatted images are provided for review. MIP images are provided for review. Dose modulation, iterative reconstruction, and/or weight based adjustment of the mA/kV was utilized to reduce the radiation dose to as low as reasonably achievable. COMPARISON: August 23 HISTORY: ORDERING SYSTEM PROVIDED HISTORY: fever, tachycardia, SOB TECHNOLOGIST PROVIDED HISTORY: Reason for exam:->fever, tachycardia, SOB Decision Support Exception - unselect if not a suspected or confirmed emergency medical condition->Emergency Medical Condition (MA) What reading provider will be dictating this exam?->CRC FINDINGS: Pulmonary Arteries: Pulmonary arteries are adequately opacified for evaluation. No evidence of intraluminal filling defect to suggest pulmonary embolism. Main pulmonary artery is normal in caliber. Mediastinum: MediPort line in the SVC. No aortic dissection or aneurysmal dilatation. Nonspecific mediastinal nodes.  Lungs/pleura: Status post left upper lobectomy with large cavity in the left upper lung, similar to the prior examination. Multifocal interstitial coarsening and pleuroparenchymal scarring in the left lower lung with surgical sutures, similar to the prior examination. Lobulated nodular mass measuring approximately 1.2 cm in diameter is unchanged since the prior examination. Pleural based densities likely pleuroparenchymal scarring in the lower lungs bilaterally, unchanged. There is no evidence of pleural effusions. No new areas of airspace consolidation. Upper Abdomen: Limited images of the upper abdomen are unremarkable. Soft Tissues/Bones: No acute bone or soft tissue abnormality. No evidence of pulmonary emboli. Status post left upper lobectomy with large cavity in the left upper lung similar to the prior examination. Multifocal interstitial coarsening and pleuroparenchymal scarring in the left lower lung with surgical sutures, similar to the prior examination. Stable lobulated nodular mass measuring approximately 1.2 cm in diameter in the area of the lingula. . Pleural based densities likely pleuroparenchymal scarring in the lower lungs bilaterally, unchanged. Nonspecific hilar and mediastinal nodes.        Discharge Medications:      Medication List      CONTINUE taking these medications    * albuterol 0.63 MG/3ML nebulizer solution  Commonly known as: ACCUNEB     * albuterol sulfate  (90 Base) MCG/ACT inhaler  Commonly known as: ProAir HFA  Inhale 2 puffs into the lungs every 6 hours as needed for Wheezing     amiodarone 100 MG tablet  Commonly known as: PACERONE  Take 1 tablet by mouth daily     apixaban 2.5 MG Tabs tablet  Commonly known as: ELIQUIS     atorvastatin 10 MG tablet  Commonly known as: LIPITOR  Take 1 tablet by mouth daily     ferrous sulfate 325 (65 Fe) MG EC tablet  Commonly known as: Fe Tabs  Take 1 tablet by mouth 2 times daily     losartan 100 MG tablet  Commonly known as: COZAAR  Take 1 tablet by mouth daily     sucralfate 1 GM/10ML suspension  Commonly known as: Carafate  Take 10 mLs by mouth 4 times daily     Trelegy Ellipta 100-62.5-25 MCG/INH Aepb  Generic drug: fluticasone-umeclidin-vilant  Inhale 1 puff into the lungs daily         * This list has 2 medication(s) that are the same as other medications prescribed for you. Read the directions carefully, and ask your doctor or other care provider to review them with you. Time Spent on discharge is more than 45 minutes in the examination, evaluation, counseling and review of medications and discharge plan.    +++++++++++++++++++++++++++++++++++++++++++++++++  Roderick Goins MD  [address]  +++++++++++++++++++++++++++++++++++++++++++++++++  NOTE: This report was transcribed using voice recognition software. Every effort was made to ensure accuracy; however, inadvertent computerized transcription errors may be present.

## 2021-10-01 NOTE — PROGRESS NOTES
Physician Progress Note      PATIENTCatkei Christensen  [facility]:                  [id number]  :                       1950  ADMIT DATE:       2021 10:47 PM  [address] [place] DATE:        2021 6:28 PM  RESPONDING  PROVIDER #:        Josette Pichardo MD          QUERY TEXT:    Patient admitted with sepsis. If possible, please document in progress notes   and discharge summary if you are evaluating and /or treating any of the   following: The medical record reflects the following:  Risk Factors: Left upper lobe lung adenocarcinoma s/p chemoradiation therapy   with carboplatin and paclitaxel  Clinical Indicators: dietary: \" Severe malnutrition, In context of chronic   illness related to catabolic illness (hx of lung CA) as evidenced by poor   intake prior to admission, severe loss of subcutaneous fat, severe muscle   loss. \"  Treatment: ONS    ASPEN Criteria:    https://aspenjournals. onlinelibrary. roque. com/doi/full/10.1177/718435462852466  5    Thank you,  Getachew Pisano RN  [phone]  Options provided:  -- Protein calorie malnutrition severe  -- Protein calorie malnutrition moderate  -- Protein calorie malnutrition mild  -- Other - I will add my own diagnosis  -- Disagree - Not applicable / Not valid  -- Disagree - Clinically unable to determine / Unknown  -- Refer to Clinical Documentation Reviewer    PROVIDER RESPONSE TEXT:    This patient has severe protein calorie malnutrition.     Query created by: Evangelina Orr on 2021 5:41 PM      Electronically signed by:  Josette Pichardo MD 10/1/2021 11:04 AM

## 2021-10-12 ENCOUNTER — OFFICE VISIT (OUTPATIENT)
Dept: PRIMARY CARE CLINIC | Age: 71
End: 2021-10-12
Payer: COMMERCIAL

## 2021-10-12 ENCOUNTER — HOSPITAL ENCOUNTER (OUTPATIENT)
Dept: CT IMAGING | Age: 71
Discharge: HOME | End: 2021-10-14
Payer: COMMERCIAL

## 2021-10-12 VITALS
HEART RATE: 99 BPM | HEIGHT: 69 IN | RESPIRATION RATE: 16 BRPM | WEIGHT: 141 LBS | TEMPERATURE: 97.1 F | OXYGEN SATURATION: 97 % | SYSTOLIC BLOOD PRESSURE: 124 MMHG | BODY MASS INDEX: 20.88 KG/M2 | DIASTOLIC BLOOD PRESSURE: 70 MMHG

## 2021-10-12 DIAGNOSIS — D64.9 ANEMIA, UNSPECIFIED TYPE: ICD-10-CM

## 2021-10-12 DIAGNOSIS — C34.90 PRIMARY ADENOCARCINOMA OF LUNG, UNSPECIFIED LATERALITY (HCC): ICD-10-CM

## 2021-10-12 DIAGNOSIS — I10 ESSENTIAL HYPERTENSION: ICD-10-CM

## 2021-10-12 DIAGNOSIS — R50.9 FEVER, UNSPECIFIED FEVER CAUSE: Primary | ICD-10-CM

## 2021-10-12 DIAGNOSIS — E78.00 HYPERCHOLESTEROLEMIA: ICD-10-CM

## 2021-10-12 PROCEDURE — 1111F DSCHRG MED/CURRENT MED MERGE: CPT | Performed by: INTERNAL MEDICINE

## 2021-10-12 PROCEDURE — 71250 CT THORAX DX C-: CPT

## 2021-10-12 PROCEDURE — 99495 TRANSJ CARE MGMT MOD F2F 14D: CPT | Performed by: INTERNAL MEDICINE

## 2021-10-12 RX ORDER — SUCRALFATE ORAL 1 G/10ML
1 SUSPENSION ORAL 4 TIMES DAILY
Qty: 1200 ML | Refills: 3 | Status: SHIPPED | OUTPATIENT
Start: 2021-10-12

## 2021-10-12 RX ORDER — ALBUTEROL SULFATE 90 UG/1
2 AEROSOL, METERED RESPIRATORY (INHALATION) EVERY 6 HOURS PRN
Qty: 18 G | Refills: 3 | Status: SHIPPED | OUTPATIENT
Start: 2021-10-12

## 2021-10-12 RX ORDER — LANOLIN ALCOHOL/MO/W.PET/CERES
325 CREAM (GRAM) TOPICAL 2 TIMES DAILY
Qty: 90 TABLET | Refills: 3 | Status: SHIPPED | OUTPATIENT
Start: 2021-10-12

## 2021-10-12 RX ORDER — FLUTICASONE FUROATE, UMECLIDINIUM BROMIDE AND VILANTEROL TRIFENATATE 100; 62.5; 25 UG/1; UG/1; UG/1
1 POWDER RESPIRATORY (INHALATION) DAILY
Qty: 1 EACH | Refills: 5 | Status: SHIPPED | OUTPATIENT
Start: 2021-10-12

## 2021-10-12 RX ORDER — AMIODARONE HYDROCHLORIDE 100 MG/1
100 TABLET ORAL DAILY
Qty: 30 TABLET | Refills: 5 | Status: CANCELLED | OUTPATIENT
Start: 2021-10-12

## 2021-10-12 RX ORDER — ATORVASTATIN CALCIUM 10 MG/1
10 TABLET, FILM COATED ORAL DAILY
Qty: 30 TABLET | Refills: 5 | Status: SHIPPED | OUTPATIENT
Start: 2021-10-12

## 2021-10-12 RX ORDER — PROMETHAZINE HYDROCHLORIDE 25 MG/1
TABLET ORAL
COMMUNITY
Start: 2021-09-24

## 2021-10-12 RX ORDER — LOSARTAN POTASSIUM 100 MG/1
100 TABLET ORAL DAILY
Qty: 30 TABLET | Refills: 5 | Status: SHIPPED | OUTPATIENT
Start: 2021-10-12

## 2021-10-12 ASSESSMENT — ENCOUNTER SYMPTOMS
BACK PAIN: 0
DIARRHEA: 0
CONSTIPATION: 0
CHOKING: 0
WHEEZING: 0
ABDOMINAL DISTENTION: 0
VOMITING: 0
CHEST TIGHTNESS: 0
SHORTNESS OF BREATH: 0
NAUSEA: 0

## 2021-10-12 NOTE — PROGRESS NOTES
Post-Discharge Transitional Care Management Services or Hospital Follow Up      Camryn Wagner   YOB: 1950    Date of Office Visit:  10/12/2021  Date of Hospital Admission: 9/24/21  Date of Hospital Discharge: 9/30/21  Readmission Risk Score(high >=14%.  Medium >=10%):Readmission Risk Score: 18      Care management risk score Rising risk (score 2-5) and Complex Care (Scores >=6): 1     Non face to face  following discharge, date last encounter closed (first attempt may have been earlier): 10/1/2021 11:27 AM 10/1/2021 11:27 AM    Call initiated 2 business days of discharge: Yes     Patient Active Problem List   Diagnosis    Non-small cell lung cancer ([address]    Atrial fibrillation ([address]    Gastroesophageal reflux disease without esophagitis    S/P lobectomy of lung    Essential hypertension    Hypercholesterolemia    Benign prostatic hyperplasia    Chest pain    Sepsis ([address]    Lung cancer, upper lobe ([address]    Severe protein-calorie malnutrition ([address]       No Known Allergies    Medications listed as ordered at the time of discharge from hospital   Saba Razo   Home Medication Instructions SASHA:    Printed on:10/12/21 8478   Medication Information                      albuterol (ACCUNEB) 0.63 MG/3ML nebulizer solution  Take 1 ampule by nebulization every 6 hours as needed for Wheezing             albuterol sulfate HFA (PROAIR HFA) 108 (90 Base) MCG/ACT inhaler  Inhale 2 puffs into the lungs every 6 hours as needed for Wheezing             amiodarone (PACERONE) 100 MG tablet  Take 1 tablet by mouth daily             apixaban (ELIQUIS) 2.5 MG TABS tablet  Take 1 tablet by mouth 2 times daily             atorvastatin (LIPITOR) 10 MG tablet  Take 1 tablet by mouth daily             ferrous sulfate (FE TABS) 325 (65 Fe) MG EC tablet  Take 1 tablet by mouth 2 times daily             fluticasone-umeclidin-vilant (TRELEGY ELLIPTA) 100-62.5-25 MCG/INH AEPB  Inhale 1 puff into the lungs daily losartan (COZAAR) 100 MG tablet  Take 1 tablet by mouth daily             promethazine (PHENERGAN) 25 MG tablet  TAKE ONE TABLET BY MOUTH EVERY 8 HOURS IF NEEDED FOR NAUSEA             sucralfate (CARAFATE) 1 GM/10ML suspension  Take 10 mLs by mouth 4 times daily                   Medications marked \"taking\" at this time  Outpatient Medications Marked as Taking for the 10/12/21 encounter (Office Visit) with Birdie Lor, DO   Medication Sig Dispense Refill    sucralfate (CARAFATE) 1 GM/10ML suspension Take 10 mLs by mouth 4 times daily 1200 mL 3    losartan (COZAAR) 100 MG tablet Take 1 tablet by mouth daily 30 tablet 5    fluticasone-umeclidin-vilant (TRELEGY ELLIPTA) 100-62.5-25 MCG/INH AEPB Inhale 1 puff into the lungs daily 1 each 5    ferrous sulfate (FE TABS) 325 (65 Fe) MG EC tablet Take 1 tablet by mouth 2 times daily 90 tablet 3    atorvastatin (LIPITOR) 10 MG tablet Take 1 tablet by mouth daily 30 tablet 5    albuterol sulfate HFA (PROAIR HFA) 108 (90 Base) MCG/ACT inhaler Inhale 2 puffs into the lungs every 6 hours as needed for Wheezing 18 g 3    apixaban (ELIQUIS) 2.5 MG TABS tablet Take 1 tablet by mouth 2 times daily 60 tablet 5    albuterol (ACCUNEB) 0.63 MG/3ML nebulizer solution Take 1 ampule by nebulization every 6 hours as needed for Wheezing          Medications patient taking as of now reconciled against medications ordered at time of hospital discharge: Yes    Chief Complaint   Patient presents with   [facility] Care Management     transitional care visit       HPI    Inpatient course: Discharge summary reviewed- see chart. Interval history/Current status:     He was initially admitted for hypotension weakness, and fever. He was seen by infectious disease and was treated with vancomycin and cefepime. He was evaluated for his dysphagia with a swallow evaluation. His hospitalization was complicated by acute kidney injury. This was improved with IV fluids.   He was followed by oncology. They recommend MRI as an outpatient. He was discharged on September 30      Review of Systems   Constitutional: Negative for activity change, appetite change, chills, fatigue and fever. Respiratory: Negative for choking, chest tightness, shortness of breath and wheezing. Cardiovascular: Negative for chest pain, palpitations and leg swelling. Gastrointestinal: Negative for abdominal distention, constipation, diarrhea, nausea and vomiting. Musculoskeletal: Negative for arthralgias, back pain, gait problem and joint swelling. Neurological: Negative for dizziness, weakness, numbness and headaches. Vitals:    10/12/21 1202   BP: 124/70   Pulse: 99   Resp: 16   Temp: 97.1 °F (36.2 °C)   SpO2: 97%   Weight: 141 lb (64 kg)   Height: 5' 9\" (1.753 m)     Body mass index is 20.82 kg/m². Wt Readings from Last 3 Encounters:   10/12/21 141 lb (64 kg)   09/24/21 147 lb (66.7 kg)   09/17/21 143 lb (64.9 kg)     BP Readings from Last 3 Encounters:   10/12/21 124/70   09/30/21 125/62   09/17/21 126/69       Physical Exam  Constitutional:       General: He is not in acute distress. Appearance: He is well-developed. He is not diaphoretic. HENT:      Head: Normocephalic and atraumatic. Eyes:      General: No scleral icterus. Left eye: No discharge. Neck:      Thyroid: No thyromegaly. Trachea: No tracheal deviation. Cardiovascular:      Rate and Rhythm: Normal rate and regular rhythm. Heart sounds: Normal heart sounds. No murmur heard. No friction rub. No gallop. Pulmonary:      Effort: Pulmonary effort is normal. No respiratory distress. Breath sounds: Normal breath sounds. No wheezing or rales. Chest:      Chest wall: No tenderness. Musculoskeletal:         General: No tenderness. Neurological:      Mental Status: He is alert and oriented to person, place, and time. Psychiatric:         Behavior: Behavior normal.             Assessment/Plan:  1.  Essential

## 2021-10-15 ENCOUNTER — TELEPHONE (OUTPATIENT)
Dept: PRIMARY CARE CLINIC | Age: 71
End: 2021-10-15

## 2021-10-18 ENCOUNTER — HOSPITAL ENCOUNTER (OUTPATIENT)
Dept: RADIATION ONCOLOGY | Age: 71
Discharge: HOME | End: 2021-10-18
Attending: RADIOLOGY
Payer: COMMERCIAL

## 2021-10-29 ENCOUNTER — HOSPITAL ENCOUNTER (OUTPATIENT)
Dept: NUCLEAR MEDICINE | Age: 71
Discharge: HOME | End: 2021-10-31
Payer: COMMERCIAL

## 2021-10-29 DIAGNOSIS — C34.32 PRIMARY MALIGNANT NEOPLASM OF BRONCHUS OF LEFT LOWER LOBE (HCC): ICD-10-CM

## 2021-10-29 PROCEDURE — A9503 TC99M MEDRONATE: HCPCS | Performed by: RADIOLOGY

## 2021-10-29 PROCEDURE — 78306 BONE IMAGING WHOLE BODY: CPT | Performed by: RADIOLOGY

## 2021-10-29 PROCEDURE — 3430000000 HC RX DIAGNOSTIC RADIOPHARMACEUTICAL: Performed by: RADIOLOGY

## 2021-10-29 PROCEDURE — 78306 BONE IMAGING WHOLE BODY: CPT

## 2021-10-29 RX ORDER — TC 99M MEDRONATE 20 MG/10ML
25 INJECTION, POWDER, LYOPHILIZED, FOR SOLUTION INTRAVENOUS
Status: COMPLETED | OUTPATIENT
Start: 2021-10-29 | End: 2021-10-29

## 2021-10-29 RX ADMIN — TC 99M MEDRONATE 21.5 MILLICURIE: 20 INJECTION, POWDER, LYOPHILIZED, FOR SOLUTION INTRAVENOUS at 11:00

## 2021-12-08 ENCOUNTER — APPOINTMENT (OUTPATIENT)
Dept: CT IMAGING | Age: 71
DRG: 177 | End: 2021-12-08
Payer: COMMERCIAL

## 2021-12-08 ENCOUNTER — HOSPITAL ENCOUNTER (INPATIENT)
Age: 71
LOS: 1 days | Discharge: HOME | DRG: 177 | End: 2021-12-09
Attending: EMERGENCY MEDICINE | Admitting: FAMILY MEDICINE
Payer: COMMERCIAL

## 2021-12-08 ENCOUNTER — APPOINTMENT (OUTPATIENT)
Dept: GENERAL RADIOLOGY | Age: 71
DRG: 177 | End: 2021-12-08
Payer: COMMERCIAL

## 2021-12-08 DIAGNOSIS — J96.00 ACUTE RESPIRATORY FAILURE, UNSPECIFIED WHETHER WITH HYPOXIA OR HYPERCAPNIA (HCC): ICD-10-CM

## 2021-12-08 DIAGNOSIS — U07.1 COVID: Primary | ICD-10-CM

## 2021-12-08 DIAGNOSIS — J44.9 CHRONIC OBSTRUCTIVE PULMONARY DISEASE, UNSPECIFIED COPD TYPE (HCC): ICD-10-CM

## 2021-12-08 DIAGNOSIS — C34.90 MALIGNANT NEOPLASM OF LUNG, UNSPECIFIED LATERALITY, UNSPECIFIED PART OF LUNG (HCC): ICD-10-CM

## 2021-12-08 LAB
ALBUMIN SERPL-MCNC: 3.7 G/DL (ref 3.5–5.2)
ALP BLD-CCNC: 72 U/L (ref 40–129)
ALT SERPL-CCNC: 6 U/L (ref 0–40)
ANION GAP SERPL CALCULATED.3IONS-SCNC: 13 MMOL/L (ref 7–16)
AST SERPL-CCNC: 21 U/L (ref 0–39)
BACTERIA: ABNORMAL /HPF
BASOPHILS ABSOLUTE: 0 E9/L (ref 0–0.2)
BASOPHILS RELATIVE PERCENT: 0 % (ref 0–2)
BILIRUB SERPL-MCNC: 0.5 MG/DL (ref 0–1.2)
BILIRUBIN URINE: NEGATIVE
BLOOD, URINE: NEGATIVE
BUN BLDV-MCNC: 12 MG/DL (ref 6–23)
BURR CELLS: ABNORMAL
C-REACTIVE PROTEIN: 27.7 MG/DL (ref 0–0.4)
CALCIUM SERPL-MCNC: 9.1 MG/DL (ref 8.6–10.2)
CHLORIDE BLD-SCNC: 98 MMOL/L (ref 98–107)
CLARITY: CLEAR
CO2: 23 MMOL/L (ref 22–29)
COLOR: YELLOW
CREAT SERPL-MCNC: 1.3 MG/DL (ref 0.7–1.2)
D DIMER: 435 NG/ML DDU
EKG ATRIAL RATE: 92 BPM
EKG P AXIS: 43 DEGREES
EKG P-R INTERVAL: 138 MS
EKG Q-T INTERVAL: 360 MS
EKG QRS DURATION: 110 MS
EKG QTC CALCULATION (BAZETT): 445 MS
EKG R AXIS: -42 DEGREES
EKG T AXIS: 92 DEGREES
EKG VENTRICULAR RATE: 92 BPM
EOSINOPHILS ABSOLUTE: 0.01 E9/L (ref 0.05–0.5)
EOSINOPHILS RELATIVE PERCENT: 0.1 % (ref 0–6)
FERRITIN: 381 NG/ML
GFR AFRICAN AMERICAN: >60
GFR NON-AFRICAN AMERICAN: 54 ML/MIN/1.73
GLUCOSE BLD-MCNC: 122 MG/DL (ref 74–99)
GLUCOSE URINE: NEGATIVE MG/DL
HCT VFR BLD CALC: 38 % (ref 37–54)
HEMOGLOBIN: 12.1 G/DL (ref 12.5–16.5)
IMMATURE GRANULOCYTES #: 0.04 E9/L
IMMATURE GRANULOCYTES %: 0.5 % (ref 0–5)
KETONES, URINE: ABNORMAL MG/DL
LACTIC ACID, SEPSIS: 1.3 MMOL/L (ref 0.5–1.9)
LEUKOCYTE ESTERASE, URINE: NEGATIVE
LYMPHOCYTES ABSOLUTE: 0.43 E9/L (ref 1.5–4)
LYMPHOCYTES RELATIVE PERCENT: 5.4 % (ref 20–42)
MCH RBC QN AUTO: 28.3 PG (ref 26–35)
MCHC RBC AUTO-ENTMCNC: 31.8 % (ref 32–34.5)
MCV RBC AUTO: 89 FL (ref 80–99.9)
MONOCYTES ABSOLUTE: 0.57 E9/L (ref 0.1–0.95)
MONOCYTES RELATIVE PERCENT: 7.2 % (ref 2–12)
NEUTROPHILS ABSOLUTE: 6.9 E9/L (ref 1.8–7.3)
NEUTROPHILS RELATIVE PERCENT: 86.8 % (ref 43–80)
NITRITE, URINE: NEGATIVE
OVALOCYTES: ABNORMAL
PDW BLD-RTO: 12.8 FL (ref 11.5–15)
PH UA: 5.5 (ref 5–9)
PLATELET # BLD: 245 E9/L (ref 130–450)
PMV BLD AUTO: 10.3 FL (ref 7–12)
POIKILOCYTES: ABNORMAL
POLYCHROMASIA: ABNORMAL
POTASSIUM SERPL-SCNC: 3.7 MMOL/L (ref 3.5–5)
PRO-BNP: 461 PG/ML (ref 0–125)
PROCALCITONIN: 0.34 NG/ML (ref 0–0.08)
PROTEIN UA: 30 MG/DL
RBC # BLD: 4.27 E12/L (ref 3.8–5.8)
RBC UA: ABNORMAL /HPF (ref 0–2)
SARS-COV-2, NAAT: DETECTED
SODIUM BLD-SCNC: 134 MMOL/L (ref 132–146)
SPECIFIC GRAVITY UA: 1.01 (ref 1–1.03)
TOTAL PROTEIN: 7.6 G/DL (ref 6.4–8.3)
TROPONIN, HIGH SENSITIVITY: 21 NG/L (ref 0–11)
TROPONIN, HIGH SENSITIVITY: 24 NG/L (ref 0–11)
UROBILINOGEN, URINE: 0.2 E.U./DL
WBC # BLD: 8 E9/L (ref 4.5–11.5)
WBC UA: ABNORMAL /HPF (ref 0–5)

## 2021-12-08 PROCEDURE — 6370000000 HC RX 637 (ALT 250 FOR IP): Performed by: EMERGENCY MEDICINE

## 2021-12-08 PROCEDURE — 93005 ELECTROCARDIOGRAM TRACING: CPT | Performed by: STUDENT IN AN ORGANIZED HEALTH CARE EDUCATION/TRAINING PROGRAM

## 2021-12-08 PROCEDURE — 99222 1ST HOSP IP/OBS MODERATE 55: CPT | Performed by: FAMILY MEDICINE

## 2021-12-08 PROCEDURE — 83605 ASSAY OF LACTIC ACID: CPT

## 2021-12-08 PROCEDURE — 6360000002 HC RX W HCPCS: Performed by: FAMILY MEDICINE

## 2021-12-08 PROCEDURE — 84484 ASSAY OF TROPONIN QUANT: CPT

## 2021-12-08 PROCEDURE — 80053 COMPREHEN METABOLIC PANEL: CPT

## 2021-12-08 PROCEDURE — 94640 AIRWAY INHALATION TREATMENT: CPT

## 2021-12-08 PROCEDURE — 82728 ASSAY OF FERRITIN: CPT

## 2021-12-08 PROCEDURE — 71045 X-RAY EXAM CHEST 1 VIEW: CPT

## 2021-12-08 PROCEDURE — 36415 COLL VENOUS BLD VENIPUNCTURE: CPT

## 2021-12-08 PROCEDURE — 2580000003 HC RX 258: Performed by: FAMILY MEDICINE

## 2021-12-08 PROCEDURE — 87088 URINE BACTERIA CULTURE: CPT

## 2021-12-08 PROCEDURE — 83880 ASSAY OF NATRIURETIC PEPTIDE: CPT

## 2021-12-08 PROCEDURE — 6370000000 HC RX 637 (ALT 250 FOR IP): Performed by: FAMILY MEDICINE

## 2021-12-08 PROCEDURE — 93010 ELECTROCARDIOGRAM REPORT: CPT | Performed by: INTERNAL MEDICINE

## 2021-12-08 PROCEDURE — 85025 COMPLETE CBC W/AUTO DIFF WBC: CPT

## 2021-12-08 PROCEDURE — 81001 URINALYSIS AUTO W/SCOPE: CPT

## 2021-12-08 PROCEDURE — 87040 BLOOD CULTURE FOR BACTERIA: CPT

## 2021-12-08 PROCEDURE — 2580000003 HC RX 258: Performed by: EMERGENCY MEDICINE

## 2021-12-08 PROCEDURE — 87635 SARS-COV-2 COVID-19 AMP PRB: CPT

## 2021-12-08 PROCEDURE — 1200000000 HC SEMI PRIVATE

## 2021-12-08 PROCEDURE — 71275 CT ANGIOGRAPHY CHEST: CPT

## 2021-12-08 PROCEDURE — 84145 PROCALCITONIN (PCT): CPT

## 2021-12-08 PROCEDURE — 85378 FIBRIN DEGRADE SEMIQUANT: CPT

## 2021-12-08 PROCEDURE — 86140 C-REACTIVE PROTEIN: CPT

## 2021-12-08 PROCEDURE — 6360000004 HC RX CONTRAST MEDICATION: Performed by: RADIOLOGY

## 2021-12-08 PROCEDURE — 94664 DEMO&/EVAL PT USE INHALER: CPT

## 2021-12-08 PROCEDURE — 99284 EMERGENCY DEPT VISIT MOD MDM: CPT

## 2021-12-08 RX ORDER — IPRATROPIUM BROMIDE AND ALBUTEROL SULFATE 2.5; .5 MG/3ML; MG/3ML
1 SOLUTION RESPIRATORY (INHALATION)
Status: DISCONTINUED | OUTPATIENT
Start: 2021-12-08 | End: 2021-12-08

## 2021-12-08 RX ORDER — DEXAMETHASONE SODIUM PHOSPHATE 10 MG/ML
6 INJECTION INTRAMUSCULAR; INTRAVENOUS EVERY 24 HOURS
Status: DISCONTINUED | OUTPATIENT
Start: 2021-12-08 | End: 2021-12-09 | Stop reason: HOSPADM

## 2021-12-08 RX ORDER — ACETAMINOPHEN 325 MG/1
650 TABLET ORAL EVERY 6 HOURS PRN
Status: DISCONTINUED | OUTPATIENT
Start: 2021-12-08 | End: 2021-12-09 | Stop reason: HOSPADM

## 2021-12-08 RX ORDER — SODIUM CHLORIDE 9 MG/ML
1000 INJECTION, SOLUTION INTRAVENOUS CONTINUOUS
Status: DISCONTINUED | OUTPATIENT
Start: 2021-12-08 | End: 2021-12-09 | Stop reason: HOSPADM

## 2021-12-08 RX ORDER — SODIUM CHLORIDE 0.9 % (FLUSH) 0.9 %
5-40 SYRINGE (ML) INJECTION PRN
Status: DISCONTINUED | OUTPATIENT
Start: 2021-12-08 | End: 2021-12-09 | Stop reason: HOSPADM

## 2021-12-08 RX ORDER — ACETAMINOPHEN 650 MG/1
650 SUPPOSITORY RECTAL EVERY 6 HOURS PRN
Status: DISCONTINUED | OUTPATIENT
Start: 2021-12-08 | End: 2021-12-09 | Stop reason: HOSPADM

## 2021-12-08 RX ORDER — FERROUS SULFATE 325(65) MG
325 TABLET ORAL 2 TIMES DAILY WITH MEALS
Status: DISCONTINUED | OUTPATIENT
Start: 2021-12-08 | End: 2021-12-09 | Stop reason: HOSPADM

## 2021-12-08 RX ORDER — ATORVASTATIN CALCIUM 10 MG/1
10 TABLET, FILM COATED ORAL DAILY
Status: DISCONTINUED | OUTPATIENT
Start: 2021-12-08 | End: 2021-12-09 | Stop reason: HOSPADM

## 2021-12-08 RX ORDER — ZINC SULFATE 50(220)MG
50 CAPSULE ORAL DAILY
Status: DISCONTINUED | OUTPATIENT
Start: 2021-12-08 | End: 2021-12-09 | Stop reason: HOSPADM

## 2021-12-08 RX ORDER — ARFORMOTEROL TARTRATE 15 UG/2ML
15 SOLUTION RESPIRATORY (INHALATION) 2 TIMES DAILY
Status: DISCONTINUED | OUTPATIENT
Start: 2021-12-08 | End: 2021-12-09 | Stop reason: HOSPADM

## 2021-12-08 RX ORDER — ASCORBIC ACID 500 MG
500 TABLET ORAL DAILY
Status: DISCONTINUED | OUTPATIENT
Start: 2021-12-08 | End: 2021-12-09 | Stop reason: HOSPADM

## 2021-12-08 RX ORDER — SUCRALFATE 1 G/1
1 TABLET ORAL EVERY 6 HOURS SCHEDULED
Status: DISCONTINUED | OUTPATIENT
Start: 2021-12-08 | End: 2021-12-09 | Stop reason: HOSPADM

## 2021-12-08 RX ORDER — AMIODARONE HYDROCHLORIDE 200 MG/1
100 TABLET ORAL DAILY
Status: DISCONTINUED | OUTPATIENT
Start: 2021-12-08 | End: 2021-12-09 | Stop reason: HOSPADM

## 2021-12-08 RX ORDER — ONDANSETRON 4 MG/1
4 TABLET, ORALLY DISINTEGRATING ORAL EVERY 8 HOURS PRN
Status: DISCONTINUED | OUTPATIENT
Start: 2021-12-08 | End: 2021-12-09 | Stop reason: HOSPADM

## 2021-12-08 RX ORDER — SODIUM CHLORIDE 9 MG/ML
1000 INJECTION, SOLUTION INTRAVENOUS CONTINUOUS
Status: DISCONTINUED | OUTPATIENT
Start: 2021-12-08 | End: 2021-12-08

## 2021-12-08 RX ORDER — GUAIFENESIN/DEXTROMETHORPHAN 100-10MG/5
5 SYRUP ORAL EVERY 4 HOURS PRN
Status: DISCONTINUED | OUTPATIENT
Start: 2021-12-08 | End: 2021-12-09 | Stop reason: HOSPADM

## 2021-12-08 RX ORDER — IPRATROPIUM BROMIDE AND ALBUTEROL SULFATE 2.5; .5 MG/3ML; MG/3ML
3 SOLUTION RESPIRATORY (INHALATION) ONCE
Status: COMPLETED | OUTPATIENT
Start: 2021-12-08 | End: 2021-12-08

## 2021-12-08 RX ORDER — VITAMIN B COMPLEX
2000 TABLET ORAL DAILY
Status: DISCONTINUED | OUTPATIENT
Start: 2021-12-08 | End: 2021-12-09 | Stop reason: HOSPADM

## 2021-12-08 RX ORDER — LANOLIN ALCOHOL/MO/W.PET/CERES
325 CREAM (GRAM) TOPICAL 2 TIMES DAILY WITH MEALS
Status: DISCONTINUED | OUTPATIENT
Start: 2021-12-08 | End: 2021-12-08 | Stop reason: CLARIF

## 2021-12-08 RX ORDER — SODIUM CHLORIDE 9 MG/ML
25 INJECTION, SOLUTION INTRAVENOUS PRN
Status: DISCONTINUED | OUTPATIENT
Start: 2021-12-08 | End: 2021-12-09 | Stop reason: HOSPADM

## 2021-12-08 RX ORDER — LOSARTAN POTASSIUM 50 MG/1
100 TABLET ORAL DAILY
Status: DISCONTINUED | OUTPATIENT
Start: 2021-12-08 | End: 2021-12-09 | Stop reason: HOSPADM

## 2021-12-08 RX ORDER — MAGNESIUM HYDROXIDE/ALUMINUM HYDROXICE/SIMETHICONE 120; 1200; 1200 MG/30ML; MG/30ML; MG/30ML
30 SUSPENSION ORAL EVERY 6 HOURS PRN
Status: DISCONTINUED | OUTPATIENT
Start: 2021-12-08 | End: 2021-12-09 | Stop reason: HOSPADM

## 2021-12-08 RX ORDER — ONDANSETRON 2 MG/ML
4 INJECTION INTRAMUSCULAR; INTRAVENOUS EVERY 6 HOURS PRN
Status: DISCONTINUED | OUTPATIENT
Start: 2021-12-08 | End: 2021-12-09 | Stop reason: HOSPADM

## 2021-12-08 RX ORDER — SODIUM CHLORIDE 0.9 % (FLUSH) 0.9 %
5-40 SYRINGE (ML) INJECTION EVERY 12 HOURS SCHEDULED
Status: DISCONTINUED | OUTPATIENT
Start: 2021-12-08 | End: 2021-12-09 | Stop reason: HOSPADM

## 2021-12-08 RX ORDER — POLYETHYLENE GLYCOL 3350 17 G/17G
17 POWDER, FOR SOLUTION ORAL DAILY PRN
Status: DISCONTINUED | OUTPATIENT
Start: 2021-12-08 | End: 2021-12-09 | Stop reason: HOSPADM

## 2021-12-08 RX ORDER — BUDESONIDE 0.5 MG/2ML
0.5 INHALANT ORAL 2 TIMES DAILY
Status: DISCONTINUED | OUTPATIENT
Start: 2021-12-08 | End: 2021-12-09 | Stop reason: HOSPADM

## 2021-12-08 RX ADMIN — IPRATROPIUM BROMIDE AND ALBUTEROL SULFATE 3 AMPULE: .5; 3 SOLUTION RESPIRATORY (INHALATION) at 13:28

## 2021-12-08 RX ADMIN — SODIUM CHLORIDE 1000 ML: 9 INJECTION, SOLUTION INTRAVENOUS at 12:22

## 2021-12-08 RX ADMIN — FERROUS SULFATE TAB 325 MG (65 MG ELEMENTAL FE) 325 MG: 325 (65 FE) TAB at 21:34

## 2021-12-08 RX ADMIN — DEXAMETHASONE SODIUM PHOSPHATE 6 MG: 10 INJECTION INTRAMUSCULAR; INTRAVENOUS at 16:24

## 2021-12-08 RX ADMIN — ATORVASTATIN CALCIUM 10 MG: 10 TABLET, FILM COATED ORAL at 21:34

## 2021-12-08 RX ADMIN — AMIODARONE HYDROCHLORIDE 100 MG: 200 TABLET ORAL at 17:11

## 2021-12-08 RX ADMIN — SUCRALFATE 1 G: 1 TABLET ORAL at 16:25

## 2021-12-08 RX ADMIN — SODIUM CHLORIDE 1000 ML: 9 INJECTION, SOLUTION INTRAVENOUS at 18:04

## 2021-12-08 RX ADMIN — Medication 2000 UNITS: at 16:25

## 2021-12-08 RX ADMIN — IOPAMIDOL 75 ML: 755 INJECTION, SOLUTION INTRAVENOUS at 13:05

## 2021-12-08 RX ADMIN — BUDESONIDE 500 MCG: 0.5 SUSPENSION RESPIRATORY (INHALATION) at 19:06

## 2021-12-08 RX ADMIN — ARFORMOTEROL TARTRATE 15 MCG: 15 SOLUTION RESPIRATORY (INHALATION) at 19:06

## 2021-12-08 RX ADMIN — APIXABAN 2.5 MG: 5 TABLET, FILM COATED ORAL at 21:34

## 2021-12-08 RX ADMIN — IPRATROPIUM BROMIDE 0.5 MG: 0.5 SOLUTION RESPIRATORY (INHALATION) at 19:06

## 2021-12-08 RX ADMIN — IPRATROPIUM BROMIDE AND ALBUTEROL SULFATE 1 AMPULE: .5; 3 SOLUTION RESPIRATORY (INHALATION) at 13:27

## 2021-12-08 RX ADMIN — OXYCODONE HYDROCHLORIDE AND ACETAMINOPHEN 500 MG: 500 TABLET ORAL at 16:25

## 2021-12-08 RX ADMIN — LOSARTAN POTASSIUM 100 MG: 50 TABLET, FILM COATED ORAL at 17:11

## 2021-12-08 RX ADMIN — ZINC SULFATE 220 MG (50 MG) CAPSULE 50 MG: CAPSULE at 16:25

## 2021-12-08 ASSESSMENT — ENCOUNTER SYMPTOMS
EYE PAIN: 0
SORE THROAT: 0
BACK PAIN: 0
COUGH: 1
NAUSEA: 0
ABDOMINAL PAIN: 0
WHEEZING: 1
SINUS PAIN: 0
EYE REDNESS: 0
VOMITING: 0
DIARRHEA: 0
SHORTNESS OF BREATH: 1

## 2021-12-08 NOTE — ED PROVIDER NOTES
Chief Complaint   Patient presents with    Shortness of Breath     covid +       69-year-old male past medical history of non-small cell lung cancer, atrial fibrillation, acid reflux, hypertension, high cholesterol presenting today with shortness of breath and increasing cough. He did at home test for Covid 8 days ago, his breathing symptoms have been worsening since then, nothing is made it better or worse, not associated with chest pain, nausea, vomiting, headache or vision changes. He does not believe he is ever a blood clot before and is not complaining of pleuritic chest pain or pain and swelling in his legs. He is not normally on oxygen and is on 4 L now currently. He has been having a productive cough that has been increasing in severity. He does take Eliquis. He notes that he did have left-sided lung cancer that was taken out at one point and then he got radiation following. Review of Systems   Constitutional: Negative for chills and fever. HENT: Negative for ear pain, sinus pain and sore throat. Eyes: Negative for pain and redness. Respiratory: Positive for cough, shortness of breath and wheezing. Cardiovascular: Negative for chest pain. Gastrointestinal: Negative for abdominal pain, diarrhea, nausea and vomiting. Genitourinary: Negative for dysuria and flank pain. Musculoskeletal: Negative for back pain and neck pain. Skin: Negative for rash. Neurological: Negative for seizures and headaches. Hematological: Negative for adenopathy. All other systems reviewed and are negative. Physical Exam  Vitals and nursing note reviewed. Constitutional:       Appearance: Normal appearance. He is not ill-appearing. HENT:      Head: Normocephalic and atraumatic. Right Ear: External ear normal.      Left Ear: External ear normal.      Nose: Nose normal.      Mouth/Throat:      Mouth: Mucous membranes are moist.      Pharynx: Oropharynx is clear.    Eyes: changes, chest x-ray demonstrated expected increase in fluid in upper left lobectomy surgical bed with no pneumothorax. CT pulmonary demonstrated no evidence of pulmonary embolism. With postsurgical changes. In light of his increasing oxygen requirements, he will need admitted for acute respiratory failure secondary to Covid. I spoke with Dr. Carleen Castillo and she will admit the patient for management. ED Course as of 12/08/21 2139   Wed Dec 08, 2021   1508 Spoke with Dr. Carleen Castillo and she will admit the patient. [MM]      ED Course User Index  [MM] Eugene Shin DO      ED Course as of 12/08/21 2139   Wed Dec 08, 2021   1508 Spoke with Dr. Carleen Castillo and she will admit the patient. [MM]      ED Course User Index  [MM] Eugene Shin DO       --------------------------------------------- PAST HISTORY ---------------------------------------------  Past Medical History:  has a past medical history of Hyperlipidemia, Hypertension, and Lung cancer ([facility] [address] Past Surgical History:  has a past surgical history that includes Lung cancer surgery; CT NEEDLE BIOPSY LUNG PERCUTANEOUS (6/17/2021); and Port Surgery (Right, 07/2021). Social History:  reports that he quit smoking about 16 months ago. His smoking use included cigarettes. He started smoking about 56 years ago. He has a 55.00 pack-year smoking history. He has never used smokeless tobacco. He reports previous alcohol use. He reports that he does not use drugs. Family History: family history includes Cancer in his brother. The patients home medications have been reviewed. Allergies: Patient has no known allergies.     -------------------------------------------------- RESULTS -------------------------------------------------    LABS:  Results for orders placed or performed during the hospital encounter of 12/08/21   COVID-19, Rapid    Specimen: Nasopharyngeal Swab   Result Value Ref Range    SARS-CoV-2, NAAT DETECTED (A) Not Detected   CBC auto differential Result Value Ref Range    WBC 8.0 4.5 - 11.5 E9/L    RBC 4.27 3.80 - 5.80 E12/L    Hemoglobin 12.1 (L) 12.5 - 16.5 g/dL    Hematocrit 38.0 37.0 - 54.0 %    MCV 89.0 80.0 - 99.9 fL    MCH 28.3 26.0 - 35.0 pg    MCHC 31.8 (L) 32.0 - 34.5 %    RDW 12.8 11.5 - 15.0 fL    Platelets 886 590 - 599 E9/L    MPV 10.3 7.0 - 12.0 fL    Neutrophils % 86.8 (H) 43.0 - 80.0 %    Immature Granulocytes % 0.5 0.0 - 5.0 %    Lymphocytes % 5.4 (L) 20.0 - 42.0 %    Monocytes % 7.2 2.0 - 12.0 %    Eosinophils % 0.1 0.0 - 6.0 %    Basophils % 0.0 0.0 - 2.0 %    Neutrophils Absolute 6.90 1.80 - 7.30 E9/L    Immature Granulocytes # 0.04 E9/L    Lymphocytes Absolute 0.43 (L) 1.50 - 4.00 E9/L    Monocytes Absolute 0.57 0.10 - 0.95 E9/L    Eosinophils Absolute 0.01 (L) 0.05 - 0.50 E9/L    Basophils Absolute 0.00 0.00 - 0.20 E9/L    Polychromasia 1+     Poikilocytes 1+     Leslie Cells 1+     Ovalocytes 1+    Comprehensive metabolic panel   Result Value Ref Range    Sodium 134 132 - 146 mmol/L    Potassium 3.7 3.5 - 5.0 mmol/L    Chloride 98 98 - 107 mmol/L    CO2 23 22 - 29 mmol/L    Anion Gap 13 7 - 16 mmol/L    Glucose 122 (H) 74 - 99 mg/dL    BUN 12 6 - 23 mg/dL    CREATININE 1.3 (H) 0.7 - 1.2 mg/dL    GFR Non-African American 54 >=60 mL/min/1.73    GFR African American >60     Calcium 9.1 8.6 - 10.2 mg/dL    Total Protein 7.6 6.4 - 8.3 g/dL    Albumin 3.7 3.5 - 5.2 g/dL    Total Bilirubin 0.5 0.0 - 1.2 mg/dL    Alkaline Phosphatase 72 40 - 129 U/L    ALT 6 0 - 40 U/L    AST 21 0 - 39 U/L   Troponin   Result Value Ref Range    Troponin, High Sensitivity 24 (H) 0 - 11 ng/L   Lactate, Sepsis   Result Value Ref Range    Lactic Acid, Sepsis 1.3 0.5 - 1.9 mmol/L   Urinalysis, reflex to microscopic   Result Value Ref Range    Color, UA Yellow Straw/Yellow    Clarity, UA Clear Clear    Glucose, Ur Negative Negative mg/dL    Bilirubin Urine Negative Negative    Ketones, Urine TRACE (A) Negative mg/dL    Specific Gravity, UA 1.010 1.005 - 1.030 Blood, Urine Negative Negative    pH, UA 5.5 5.0 - 9.0    Protein, UA 30 (A) Negative mg/dL    Urobilinogen, Urine 0.2 <2.0 E.U./dL    Nitrite, Urine Negative Negative    Leukocyte Esterase, Urine Negative Negative   Brain Natriuretic Peptide   Result Value Ref Range    Pro- (H) 0 - 125 pg/mL   Troponin   Result Value Ref Range    Troponin, High Sensitivity 21 (H) 0 - 11 ng/L   D-Dimer, Quantitative   Result Value Ref Range    D-Dimer, Quant 435 ng/mL DDU   C-Reactive Protein   Result Value Ref Range    CRP 27.7 (H) 0.0 - 0.4 mg/dL   Ferritin   Result Value Ref Range    Ferritin 381 ng/mL   Procalcitonin   Result Value Ref Range    Procalcitonin 0.34 (H) 0.00 - 0.08 ng/mL   Microscopic Urinalysis   Result Value Ref Range    WBC, UA NONE 0 - 5 /HPF    RBC, UA NONE 0 - 2 /HPF    Bacteria, UA RARE (A) None Seen /HPF   EKG 12 Lead   Result Value Ref Range    Ventricular Rate 92 BPM    Atrial Rate 92 BPM    P-R Interval 138 ms    QRS Duration 110 ms    Q-T Interval 360 ms    QTc Calculation (Bazett) 445 ms    P Axis 43 degrees    R Axis -42 degrees    T Axis 92 degrees       RADIOLOGY:  CTA PULMONARY W CONTRAST   Final Result   No evidence of a pulmonary embolism. Postsurgical changes related to left upper lobectomy with a hydropneumothorax   at the left apex. The volume of fluid has increased since the prior study. New focal, lytic abnormalities in the posterior aspect of the T12 vertebral   body, suspicious for metastatic disease. Correlation with nuclear medicine   bone scan is suggested. Additional, incidental findings as above. XR CHEST PORTABLE   Final Result   Expected increase in fluid in left upper lobe lobectomy surgical bed.   No   pneumothorax.           ------------------------- NURSING NOTES AND VITALS REVIEWED ---------------------------  Date / Time Roomed:  12/8/2021 11:07 AM  ED Bed Assignment:  06/06    The nursing notes within the ED encounter and vital signs as below have been reviewed. Patient Vitals for the past 24 hrs:   BP Temp Temp src Pulse Resp SpO2 Height Weight   12/08/21 2046 (!) 102/56 98.5 °F (36.9 °C) -- 86 14 95 % -- --   12/08/21 1957 (!) 108/59 -- -- 90 14 93 % -- --   12/08/21 1330 -- -- -- -- -- 99 % -- --   12/08/21 1329 -- -- -- -- -- 100 % -- --   12/08/21 1328 -- -- -- -- -- 100 % -- --   12/08/21 1327 -- -- -- -- -- 100 % -- --   12/08/21 1121 (!) 129/56 98.6 °F (37 °C) Oral 99 16 99 % 5' 9\" (1.753 m) 130 lb (59 kg)       Oxygen Saturation Interpretation: Improved after treatment    ------------------------------------------ PROGRESS NOTES ------------------------------------------  Re-evaluation(s):  Time: 1430  Patients symptoms show no change  Repeat physical examination is not changed    Counseling:  I have spoken with the patient and discussed todays results, in addition to providing specific details for the plan of care and counseling regarding the diagnosis and prognosis. Their questions are answered at this time and they are agreeable with the plan of admission.    --------------------------------- ADDITIONAL PROVIDER NOTES ---------------------------------  Consultations:  Time: 3498. Spoke with Dr. Kailyn Dong. Discussed case. They will admit the patient. This patient's ED course included: a personal history and physicial examination, re-evaluation prior to disposition, multiple bedside re-evaluations, cardiac monitoring, continuous pulse oximetry and complex medical decision making and emergency management    This patient has remained hemodynamically stable during their ED course. Diagnosis:  1. COVID    2. Acute respiratory failure, unspecified whether with hypoxia or hypercapnia ([address]    3. Chronic obstructive pulmonary disease, unspecified COPD type ([address]    4.  Malignant neoplasm of lung, unspecified laterality, unspecified part of lung ([address]        Disposition:  Patient's disposition: Admit to telemetry  Patient's condition is stable.            Abhay Ratliff,   Resident  12/08/21 2132

## 2021-12-08 NOTE — PROGRESS NOTES
-Consulted to dose baricitinib VS tocilizumab. -D-dimer is elevated, thus patient will qualify for baricitinib.   -eGFR 30-60  -Patient is on supplemental oxygen, steroids, anticoagulation.   -Pulmonology is consulted. -CRP is pending.  -I spoke with Dr. Sarika Gardiner and if CRP is 7.5 or greater, will order tocilizumab.   -If CRP is less than 7.5 will start baricitinib 2 mg PO once daily for 14 days or until hospital discharge.

## 2021-12-08 NOTE — H&P
[facility] History and Physical          ASSESSMENT:      Active Problems:    Acute respiratory failure (HCC)  Resolved Problems:    * No resolved hospital problems. *      PLAN:    1. Acute respiratory failure with hypoxia: sec to COVID 19 . Patient was positive 8 days ago and is non vaccinated . CXR reveals fluid in left upper lobe no consolidation. Lactic acid 1.3  Trop 24 WBC 8.0 Dimer 435 CTA revealed no pulmonary embolus . Admit inpatient blood cultures Decadron  Pharmacy consult for baricitib vs tocilizamaub pending inflammatory markers     COVID 19 infection ; patient is non vaccinated . COVID positive 8 days ago     Elevated Trop : 24 , 21 no chest pain EKG no acute changes     Acute kidney injury :  Scr 1.3 IVF  Gentle  hydration  at 75 cc/hr       History of Lung adenocarcinoma: as seen on CTA lung changes and fluid in left upper  lobe lobectomy and hydropneumothorax . Patient completed chemotherapy and radiation in August 2021 . Follows with [facility] . COPD : C/w Trelegy and Albuterol     Atrial Fibrillation : C/w Amiodarone Eliquis    Hypertension: C/w Cozaar                  Code Status: Full   DVT prophylaxis: Eliquis       CHIEF COMPLAINT:  SOB     History of Present Illness:  80year old male with past medical history of HTN Lung cancer HLD GERD and atrial fibrillation  Patient tested positive for COVID 19 8 days ago . He states that he has had increasing shortness of breath and fatigue . He reports dyspnea on exertion . He states that he has had diarrhea . He reports no loss of taste or smell. He reports no fever or chills. He report no leg swelling hemoptysis. Patient has no history of oxygen use at home  Patent has completed chemotherapy and radiation in August 2021 . In the ED patient was placed on 4 liters nasal cannula  and was saturating >88% . Patient was afebrile and blood pressure was stable .  Lab data reveal creatinine 1.3 potassium 3.7 lactic acid for Wheezing    Historical Provider, MD   amiodarone (PACERONE) 100 MG tablet Take 1 tablet by mouth daily 3/16/21   Josepha Goldmann, DO       Allergies:    Patient has no known allergies. Social History:    reports that he quit smoking about 16 months ago. His smoking use included cigarettes. He started smoking about 56 years ago. He has a 55.00 pack-year smoking history. He has never used smokeless tobacco. He reports previous alcohol use. He reports that he does not use drugs. Family History:   family history includes Cancer in his brother. PHYSICAL EXAM:  Vitals:  BP (!) 129/56   Pulse 99   Temp 98.6 °F (37 °C) (Oral)   Resp 16   Ht 5' 9\" (1.753 m)   Wt 130 lb (59 kg)   SpO2 99%   BMI 19.20 kg/m²     General Appearance: alert and oriented to person, place and time and in no acute distress  Skin: warm and dry  Head: normocephalic and atraumatic  Eyes: pupils equal, round, and reactive to light, extraocular eye movements intact, conjunctivae normal  Neck: neck supple and non tender without mass   Pulmonary/Chest: bilateral wheezing and rhonchi   Cardiovascular: normal rate, normal S1 and S2 and no carotid bruits  Abdomen: soft, non-tender, non-distended, normal bowel sounds, no masses or organomegaly  Extremities: no cyanosis, no clubbing and no edema  Neurologic: no cranial nerve deficit and speech normal        LABS:  Recent Labs     12/08/21  1151      K 3.7   CL 98   CO2 23   BUN 12   CREATININE 1.3*   GLUCOSE 122*   CALCIUM 9.1       Recent Labs     12/08/21  1151   WBC 8.0   RBC 4.27   HGB 12.1*   HCT 38.0   MCV 89.0   MCH 28.3   MCHC 31.8*   RDW 12.8      MPV 10.3       No results for input(s): POCGLU in the last 72 hours. Radiology:   CTA PULMONARY W CONTRAST   Final Result   No evidence of a pulmonary embolism. Postsurgical changes related to left upper lobectomy with a hydropneumothorax   at the left apex.   The volume of fluid has increased since the prior study. New focal, lytic abnormalities in the posterior aspect of the T12 vertebral   body, suspicious for metastatic disease. Correlation with nuclear medicine   bone scan is suggested. Additional, incidental findings as above. XR CHEST PORTABLE   Final Result   Expected increase in fluid in left upper lobe lobectomy surgical bed. No   pneumothorax. EKG: NSR with no acute changes       NOTE: This report was transcribed using voice recognition software. Every effort was made to ensure accuracy; however, inadvertent computerized transcription errors may be present.   Electronically signed by Elyssa Martin MD on 12/8/2021 at 4:08 PM

## 2021-12-09 VITALS
SYSTOLIC BLOOD PRESSURE: 114 MMHG | WEIGHT: 130 LBS | RESPIRATION RATE: 16 BRPM | TEMPERATURE: 97.8 F | OXYGEN SATURATION: 92 % | HEIGHT: 69 IN | BODY MASS INDEX: 19.26 KG/M2 | HEART RATE: 82 BPM | DIASTOLIC BLOOD PRESSURE: 56 MMHG

## 2021-12-09 LAB
ALBUMIN SERPL-MCNC: 3.6 G/DL (ref 3.5–5.2)
ALP BLD-CCNC: 69 U/L (ref 40–129)
ALT SERPL-CCNC: 6 U/L (ref 0–40)
ANION GAP SERPL CALCULATED.3IONS-SCNC: 15 MMOL/L (ref 7–16)
ANISOCYTOSIS: ABNORMAL
AST SERPL-CCNC: 19 U/L (ref 0–39)
BASOPHILS ABSOLUTE: 0.01 E9/L (ref 0–0.2)
BASOPHILS RELATIVE PERCENT: 0.1 % (ref 0–2)
BILIRUB SERPL-MCNC: 0.2 MG/DL (ref 0–1.2)
BUN BLDV-MCNC: 29 MG/DL (ref 6–23)
BURR CELLS: ABNORMAL
C-REACTIVE PROTEIN: 21.8 MG/DL (ref 0–0.4)
CALCIUM SERPL-MCNC: 9.3 MG/DL (ref 8.6–10.2)
CHLORIDE BLD-SCNC: 102 MMOL/L (ref 98–107)
CO2: 22 MMOL/L (ref 22–29)
CREAT SERPL-MCNC: 1.2 MG/DL (ref 0.7–1.2)
D DIMER: 399 NG/ML DDU
EOSINOPHILS ABSOLUTE: 0 E9/L (ref 0.05–0.5)
EOSINOPHILS RELATIVE PERCENT: 0 % (ref 0–6)
FIBRINOGEN: >700 MG/DL (ref 225–540)
GFR AFRICAN AMERICAN: >60
GFR NON-AFRICAN AMERICAN: 60 ML/MIN/1.73
GLUCOSE BLD-MCNC: 174 MG/DL (ref 74–99)
HCT VFR BLD CALC: 31.8 % (ref 37–54)
HEMOGLOBIN: 10.5 G/DL (ref 12.5–16.5)
IMMATURE GRANULOCYTES #: 0.06 E9/L
IMMATURE GRANULOCYTES %: 0.6 % (ref 0–5)
LYMPHOCYTES ABSOLUTE: 0.26 E9/L (ref 1.5–4)
LYMPHOCYTES RELATIVE PERCENT: 2.6 % (ref 20–42)
MAGNESIUM: 1.8 MG/DL (ref 1.6–2.6)
MCH RBC QN AUTO: 29.3 PG (ref 26–35)
MCHC RBC AUTO-ENTMCNC: 33 % (ref 32–34.5)
MCV RBC AUTO: 88.8 FL (ref 80–99.9)
MONOCYTES ABSOLUTE: 0.5 E9/L (ref 0.1–0.95)
MONOCYTES RELATIVE PERCENT: 4.9 % (ref 2–12)
NEUTROPHILS ABSOLUTE: 9.33 E9/L (ref 1.8–7.3)
NEUTROPHILS RELATIVE PERCENT: 91.8 % (ref 43–80)
OVALOCYTES: ABNORMAL
PDW BLD-RTO: 12.6 FL (ref 11.5–15)
PLATELET # BLD: 259 E9/L (ref 130–450)
PMV BLD AUTO: 9.6 FL (ref 7–12)
POIKILOCYTES: ABNORMAL
POLYCHROMASIA: ABNORMAL
POTASSIUM REFLEX MAGNESIUM: 3.5 MMOL/L (ref 3.5–5)
PROCALCITONIN: 0.42 NG/ML (ref 0–0.08)
RBC # BLD: 3.58 E12/L (ref 3.8–5.8)
SODIUM BLD-SCNC: 139 MMOL/L (ref 132–146)
TEAR DROP CELLS: ABNORMAL
TOTAL PROTEIN: 7.4 G/DL (ref 6.4–8.3)
TOXIC GRANULATION: ABNORMAL
WBC # BLD: 10.2 E9/L (ref 4.5–11.5)

## 2021-12-09 PROCEDURE — 6360000002 HC RX W HCPCS: Performed by: FAMILY MEDICINE

## 2021-12-09 PROCEDURE — 85378 FIBRIN DEGRADE SEMIQUANT: CPT

## 2021-12-09 PROCEDURE — 85384 FIBRINOGEN ACTIVITY: CPT

## 2021-12-09 PROCEDURE — 86140 C-REACTIVE PROTEIN: CPT

## 2021-12-09 PROCEDURE — 6370000000 HC RX 637 (ALT 250 FOR IP): Performed by: FAMILY MEDICINE

## 2021-12-09 PROCEDURE — 94640 AIRWAY INHALATION TREATMENT: CPT

## 2021-12-09 PROCEDURE — 80053 COMPREHEN METABOLIC PANEL: CPT

## 2021-12-09 PROCEDURE — 85025 COMPLETE CBC W/AUTO DIFF WBC: CPT

## 2021-12-09 PROCEDURE — 83735 ASSAY OF MAGNESIUM: CPT

## 2021-12-09 PROCEDURE — 84145 PROCALCITONIN (PCT): CPT

## 2021-12-09 PROCEDURE — 2580000003 HC RX 258: Performed by: FAMILY MEDICINE

## 2021-12-09 PROCEDURE — 36415 COLL VENOUS BLD VENIPUNCTURE: CPT

## 2021-12-09 PROCEDURE — 99239 HOSP IP/OBS DSCHRG MGMT >30: CPT | Performed by: INTERNAL MEDICINE

## 2021-12-09 RX ORDER — DEXAMETHASONE 6 MG/1
6 TABLET ORAL
Qty: 10 TABLET | Refills: 0 | Status: SHIPPED | OUTPATIENT
Start: 2021-12-09 | End: 2021-12-19

## 2021-12-09 RX ADMIN — CEFTRIAXONE 1000 MG: 1 INJECTION, POWDER, FOR SOLUTION INTRAMUSCULAR; INTRAVENOUS at 01:08

## 2021-12-09 RX ADMIN — FERROUS SULFATE TAB 325 MG (65 MG ELEMENTAL FE) 325 MG: 325 (65 FE) TAB at 08:28

## 2021-12-09 RX ADMIN — APIXABAN 2.5 MG: 5 TABLET, FILM COATED ORAL at 08:27

## 2021-12-09 RX ADMIN — IPRATROPIUM BROMIDE 0.5 MG: 0.5 SOLUTION RESPIRATORY (INHALATION) at 14:06

## 2021-12-09 RX ADMIN — ZINC SULFATE 220 MG (50 MG) CAPSULE 50 MG: CAPSULE at 08:27

## 2021-12-09 RX ADMIN — SUCRALFATE 1 G: 1 TABLET ORAL at 05:47

## 2021-12-09 RX ADMIN — SUCRALFATE 1 G: 1 TABLET ORAL at 01:09

## 2021-12-09 RX ADMIN — BUDESONIDE 500 MCG: 0.5 SUSPENSION RESPIRATORY (INHALATION) at 10:40

## 2021-12-09 RX ADMIN — ATORVASTATIN CALCIUM 10 MG: 10 TABLET, FILM COATED ORAL at 08:28

## 2021-12-09 RX ADMIN — SODIUM CHLORIDE, PRESERVATIVE FREE 10 ML: 5 INJECTION INTRAVENOUS at 08:29

## 2021-12-09 RX ADMIN — ARFORMOTEROL TARTRATE 15 MCG: 15 SOLUTION RESPIRATORY (INHALATION) at 10:40

## 2021-12-09 RX ADMIN — AMIODARONE HYDROCHLORIDE 100 MG: 200 TABLET ORAL at 08:27

## 2021-12-09 RX ADMIN — AZITHROMYCIN 500 MG: 500 INJECTION, POWDER, LYOPHILIZED, FOR SOLUTION INTRAVENOUS at 01:08

## 2021-12-09 RX ADMIN — LOSARTAN POTASSIUM 100 MG: 50 TABLET, FILM COATED ORAL at 08:28

## 2021-12-09 RX ADMIN — IPRATROPIUM BROMIDE 0.5 MG: 0.5 SOLUTION RESPIRATORY (INHALATION) at 10:40

## 2021-12-09 RX ADMIN — OXYCODONE HYDROCHLORIDE AND ACETAMINOPHEN 500 MG: 500 TABLET ORAL at 08:28

## 2021-12-09 RX ADMIN — SUCRALFATE 1 G: 1 TABLET ORAL at 11:43

## 2021-12-09 RX ADMIN — Medication 2000 UNITS: at 08:27

## 2021-12-09 NOTE — DISCHARGE SUMMARY
[facility] Physician Discharge Summary     Benja Sandoval MD  [address] [address] [phone]      on CT: \"Compared to the prior examination, there are two focal lucencies involving the posterior aspect of the T12 vertebral body along the right lateral aspect;\" not noted in CT chest from October    Activity level   As tolerated    Disposition   Home      Condition on discharge Stable    Patient ID   Alex Levi, 80 y.o.male /  1950  / MRN [id number]    Admit date   2021    Discharge date  2021  2:41 PM    Admission diagnoses Active Problems:    Acute respiratory failure ([address]  Resolved Problems:    * No resolved hospital problems. *    Discharge diagnoses Same    Consults   IP CONSULT TO PHARMACY  IP CONSULT TO DIETITIAN    Procedures   See hospital course    Hospital Course  71M PMH lung adenoCa s/p LULectomy admitted yesterday afternoon w COVID and hypoxia. Do not see the patient's pulse ox on room air listed, but he was 99% on 4 L, this was quickly weaned down to room air which patient has been stable on for almost 24 hours now. No fevers. Pt says having some mild diarrhea but able to get to bathroom safely without additional issues. Imaging did note \"there are two focal lucencies involving the posterior aspect of the T12 vertebral body along the   right lateral aspect on image 119.  These measure 14 mm, and 12 mm respectively;\" CT Oct 12 did not comment on this and recommend pt f/u w their hematologist for further evaluation. Pt is otherwise stable for dc home at this time.      Discharge Exam  BP (!) 114/56   Pulse 82   Temp 97.8 °F (36.6 °C) (Oral)   Resp 16   Ht 5' 9\" (1.753 m)   Wt 130 lb (59 kg)   SpO2 92%   BMI 19.20 kg/m²   General Appearance: alert and oriented to person, place and time and in no acute distress  Skin: warm and dry  Head: normocephalic and atraumatic  Eyes: pupils equal, round, and reactive to light, extraocular eye movements intact, conjunctivae normal  Neck: neck supple and non tender without mass   Pulmonary/Chest: clear to auscultation bilaterally- no wheezes, rales or rhonchi, normal air movement, no respiratory distress  Cardiovascular: normal rate, normal S1 and S2 and no carotid bruits  Abdomen: soft, non-tender, non-distended, normal bowel sounds, no masses or organomegaly  Extremities: no cyanosis, no clubbing and no edema  Neurologic: no cranial nerve deficit and speech normal    I/O last 3 completed shifts: In: 250 [IV Piggyback:250]  Out: -   No intake/output data recorded. Labs  Recent Labs     12/08/21  1151      K 3.7   CL 98   CO2 23   BUN 12   CREATININE 1.3*   GLUCOSE 122*   CALCIUM 9.1   WBC 8.0   RBC 4.27   HGB 12.1*   HCT 38.0   MCV 89.0   MCH 28.3   MCHC 31.8*   RDW 12.8      MPV 10.3       Imaging  XR CHEST PORTABLE    Result Date: 12/8/2021  Expected increase in fluid in left upper lobe lobectomy surgical bed. No pneumothorax. CTA PULMONARY W CONTRAST    Result Date: 12/8/2021  No evidence of a pulmonary embolism. Postsurgical changes related to left upper lobectomy with a hydropneumothorax at the left apex. The volume of fluid has increased since the prior study. New focal, lytic abnormalities in the posterior aspect of the T12 vertebral body, suspicious for metastatic disease. Correlation with nuclear medicine bone scan is suggested. Additional, incidental findings as above.      Patient Instructions     Medication List      ASK your doctor about these medications    * albuterol 0.63 MG/3ML nebulizer solution  Commonly known as: ACCUNEB     * albuterol sulfate  (90 Base) MCG/ACT inhaler  Commonly known as: ProAir HFA  Inhale 2 puffs into the lungs every 6 hours as needed for Wheezing     amiodarone 100 MG tablet  Commonly known as: PACERONE  Take 1 tablet by mouth daily     apixaban 2.5 MG Tabs tablet  Commonly known as: ELIQUIS  Take 1 tablet by mouth 2 times daily     atorvastatin 10 MG tablet  Commonly known as: LIPITOR  Take 1 tablet by mouth daily     ferrous sulfate 325 (65 Fe) MG EC tablet  Commonly known as: Fe Tabs  Take 1 tablet by mouth 2 times daily     losartan 100 MG tablet  Commonly known as: COZAAR  Take 1 tablet by mouth daily     promethazine 25 MG tablet  Commonly known as: PHENERGAN     sucralfate 1 GM/10ML suspension  Commonly known as: Carafate  Take 10 mLs by mouth 4 times daily     Trelegy Ellipta 100-62.5-25 MCG/INH Aepb  Generic drug: fluticasone-umeclidin-vilant  Inhale 1 puff into the lungs daily         * This list has 2 medication(s) that are the same as other medications prescribed for you. Read the directions carefully, and ask your doctor or other care provider to review them with you.               Note that more than 30 minutes was spent in preparing discharge papers, discussing discharge with patient, medication review, etc.    Electronically signed by Jonathon Sebastian DO on 12/9/2021 at 2:41 PM

## 2021-12-09 NOTE — CARE COORDINATION
Social Work / Discharge Planning:    COVID positive 12/8/21. Not vaccinated. Patient admitted for acute respiratory failure. Chart reviewed. Patient has a history of lung cancer, completed chemotherapy and radiation in August 2021. Follows with [facility]. He is from home and is on room air. Social work spoke to patient and explained Social Work role and discussed transition of care/discharge planning. Patient reports he lives in a one story home with his daughter. Reports he is independent and uses cane to ambulate. Plan at discharge is home and does not anticipate any needs at this time. Patient states either his daughter will be able to find someone to pick him up at discharge or he can take a cab. Daughter is not able to pick him up due to recent surgery. Social work informed patient he could obtain transportation from his insurance and declined. Patient states he has attempted to use his insurance transportation in the past and they have repeatedly no showed. Social work will continue to follow.  Electronically signed by SARAH Agosto on 12/9/21 at 2:55 PM EST

## 2021-12-09 NOTE — ACP (ADVANCE CARE PLANNING)
Advance Care Planning     Advance Care Planning Activator (Inpatient)  Conversation Note      Date of ACP Conversation: 12/9/2021     Conversation Conducted with: Patient with Decision Making Capacity    ACP Activator: KAREEM Gottlieb, [facility] Decision Maker:     Current Designated Health Care Decision Maker:     Primary Decision Maker: Amy Prado - Child - [phone]    Today we documented Decision Maker(s) consistent with Legal Next of Kin hierarchy. Care Preferences    Ventilation: \"If you were in your present state of health and suddenly became very ill and were unable to breathe on your own, what would your preference be about the use of a ventilator (breathing machine) if it were available to you? \"      Would the patient desire the use of ventilator (breathing machine)?: yes    \"If your health worsens and it becomes clear that your chance of recovery is unlikely, what would your preference be about the use of a ventilator (breathing machine) if it were available to you? \"     Would the patient desire the use of ventilator (breathing machine)?: Yes      Resuscitation  \"CPR works best to restart the heart when there is a sudden event, like a heart attack, in someone who is otherwise healthy. Unfortunately, CPR does not typically restart the heart for people who have serious health conditions or who are very sick. \"    \"In the event your heart stopped as a result of an underlying serious health condition, would you want attempts to be made to restart your heart (answer \"yes\" for attempt to resuscitate) or would you prefer a natural death (answer \"no\" for do not attempt to resuscitate)? \" yes       [x] Yes   [] No   Educated Patient / Clearence Golds regarding differences between Advance Directives and portable DNR orders.     Length of ACP Conversation in minutes:  5  Conversation Outcomes:  [x] ACP discussion completed  [] Existing advance directive reviewed with patient; no changes to patient's previously recorded wishes  [] New Advance Directive completed  [] Portable Do Not Rescitate prepared for Provider review and signature  [] POLST/POST/MOLST/MOST prepared for Provider review and signature      Follow-up plan:    [] Schedule follow-up conversation to continue planning  [x] Referred individual to Provider for additional questions/concerns   [] Advised patient/agent/surrogate to review completed ACP document and update if needed with changes in condition, patient preferences or care setting    [] This note routed to one or more involved healthcare providers

## 2021-12-09 NOTE — ED NOTES
SBAR faxed to [facility] ; receipt confirmed with Darrel Montoya and fax confirmation.      Armond Arvizu RN  12/08/21 2568

## 2021-12-10 ENCOUNTER — CARE COORDINATION (OUTPATIENT)
Dept: CASE MANAGEMENT | Age: 71
End: 2021-12-10

## 2021-12-10 NOTE — CARE COORDINATION
[facility] Transitions Initial Follow Up Call    Call within 2 business days of discharge: Yes    Patient: Remi Maciel Patient : 1950   MRN: [id number]  Reason for Admission: Acute respiratory failure/COVID-19+  Discharge Date: 21 RARS: Readmission Risk Score: 21.4 ( )      Last Discharge [facility]       Complaint Diagnosis Description Type Department Provider    21 Shortness of Breath MAIRAID Eliane Stafford . ED to Hosp-Admission (Discharged) (ADMITTED) STELL[facility]SB Janae Villanueva DO; Beba Dunbar. .. Transitions of Care Initial Call    Was this an external facility discharge? No Discharge Facility: N/A    Challenges to be reviewed by the provider   Additional needs identified to be addressed with provider: No  none             Method of communication with provider : none      Advance Care Planning:   Does patient have an Advance Directive: reviewed and current. Was this a readmission? No  Patient stated reason for admission: shortness of breath  Patients top risk factors for readmission: lack of knowledge about disease  medical condition-AFib on Eliquis therapy, non-small cell lung cancer with former radiation/chemo Aug 2021, Sepsis and acute respiratory failure. polypharmacy    Care Transition Nurse (CTN) contacted the family by telephone to perform post hospital discharge assessment. Verified name and  with family as identifiers. Provided introduction to self, and explanation of the CTN role. CTN reviewed discharge instructions, medical action plan and red flags with family who verbalized understanding. Family given an opportunity to ask questions and does not have any further questions or concerns at this time. Were discharge instructions available to patient? Yes. Reviewed appropriate site of care based on symptoms and resources available to patient including: PCP, Specialist, Urgent care clinics, When to call 911 and Condition related references.  The family agrees to contact the PCP office for questions related to their healthcare. Medication reconciliation was performed with family, who verbalizes understanding of administration of home medications. Advised obtaining a 90-day supply of all daily and as-needed medications. Covid Risk Education     Educated patient about risk for severe COVID-19 due to risk factors according to CDC guidelines. CTN reviewed discharge instructions, medical action plan and red flag symptoms with the family who verbalized understanding. Discussed COVID vaccination status: Yes. Education provided on COVID-19 vaccination as appropriate. Discussed exposure protocols and quarantine with CDC Guidelines. Family was given an opportunity to verbalize any questions and concerns and agrees to contact CTN or health care provider for questions related to their healthcare. Reviewed and educated family on any new and changed medications related to discharge diagnosis. Was patient discharged with a pulse oximeter? No Discussed and confirmed pulse oximeter discharge instructions and when to notify provider or seek emergency care. CTN provided contact information. Plan for follow-up call in 5-7 days based on severity of symptoms and risk factors. Plan for next call: follow up appointment-Did patient get scheduled for HFU appt with Dr. Franklin Fields (PCP)? Non-face-to-face services provided:  Scheduled appointment with PCP-CTN confirmed with DTR Alex Zee) that she will call and schedule f/u appt with Dr. Franklin Fields (PCP) and declines needing any CTN assistance. Scheduled appointment with Specialist-CTN confirmed with DTR Alex Zee) that she will call and schedule follow up with [facility] in [address] and reviewed discharge summary and/or continuity of care documents  Education of patient/family/caregiver/guardian to support self-management-Discussed COVID zone tool/precautions and knowing when to see seek medical attention.   Assessment and support for treatment adherence and medication management-Discussed bleeding precautions associated with blood thinner therapy knowing when to seek immediate medical attention. Advised of importance for patient to take Decadron as directed until completely finished. Care Transitions 24 Hour Call    Schedule Follow Up Appointment with PCP: Declined  Do you have any ongoing symptoms?: No  Do you have a copy of your discharge instructions?: Yes  Do you have all of your prescriptions and are they filled?: Yes  Have you been contacted by a [facility] Pharmacist?: No  Have you scheduled your follow up appointment?: No  Were you discharged with any Home Care or Post Acute Services: No  Do you feel like you have everything you need to keep you well at home?: Yes  Care Transitions Interventions       Spoke with patient DTR Gerardo Waite) on communication release form regarding hospital discharge/COVID-19+ follow up. Arley Mckeon reports patient is feeling better since discharge. States patient still has a cough which is improving but no shortness of breath, chest pain or chest discomfort. Denies patient being on home oxygen but admits having a pulse ox and last saturation reading this morning was 95% on R/A. Denies patient being a current smoker admitting he quit approximately 1.5 years ago. Denies patient having any abdominal pain, nausea, vomiting, diarrhea, chills or fever. Provided a complete review of home meds with Arley Mckeon who confirms patient obtained Decadron on discharge. Advised of importance for patient to take Decadron as directed until completely finished. Discussed bleeding precautions associated with blood thinner therapy knowing when to seek immediate medical attention. Discussed COVID zone tool/precautions and knowing when to seek medical attention. Confirmed with Arley Mckeon she will call and schedule follow up with PCP and [facility]. Denies any complaints or needs at this time. CTN will continue to follow.      Follow Up  No future appointments.     Rosa Pearce, APRN

## 2021-12-11 LAB — URINE CULTURE, ROUTINE: NORMAL

## 2021-12-13 LAB
BLOOD CULTURE, ROUTINE: NORMAL
BLOOD CULTURE, ROUTINE: NORMAL
CULTURE, BLOOD 2: NORMAL

## 2021-12-14 ENCOUNTER — CARE COORDINATION (OUTPATIENT)
Dept: CARE COORDINATION | Age: 71
End: 2021-12-14

## 2021-12-14 NOTE — CARE COORDINATION
contacted patient to see if hospital follow up appointment with PCP has been scheduled. Patient states he has not scheduled an appointment yet.  requested that patient contact PCP to schedule appointment and offered to assist with making call. Patient declined assistance and states he will try to call today or tomorrow.

## 2022-01-04 ENCOUNTER — CARE COORDINATION (OUTPATIENT)
Dept: CASE MANAGEMENT | Age: 72
End: 2022-01-04

## 2022-01-04 NOTE — CARE COORDINATION
You Patient resolved from the Care Transitions episode on 1/4/22  Discussed COVID-19 related testing which was available at this time. Test results were positive. Patient informed of results, if available? Yes    Patient/family has been provided the following resources and education related to COVID-19:                         Signs, symptoms and red flags related to COVID-19            CDC exposure and quarantine guidelines            [employer] exposure contact - [phone]            Contact for their local Department of Health                 Patient currently reports that the following symptoms have improved:  no new/worsening symptoms     CTN spoke with patient DTR Roscoe Cartwright) on communication release form regarding final hospital discharge/COVID-19+ follow up call. Prasad Membreno reports patient continues doing well and offers no complaints at this time. States cough has resolved and patient is finished with Decadron therapy that was ordered on discharge. Reiterated COVID zone tool/precautions and knowing when to seek medical attention. Jordana aware of importance for patient to schedudle f/u appts with PCP and [facility] which patient has not done yet. CTN and  has offered to schedule and patient advises he will do himself. Denies any further needs or concerns. CTN signing off. No further outreach scheduled with this CTN/ACM. Episode of Care resolved. Patient has this CTN/ACM contact information if future needs arise.

## 2023-02-14 NOTE — PROGRESS NOTES
[facility] PROGRESS NOTE      Chief complaint: Follow-up of fever    The patient is a 80 y.o. male with history of hypertension, hyperlipidemia, COPD, left upper lobe lung adenocarcinoma diagnosed in 04/2020 s/p left upper lobectomy and left lower lobe wedge resection in 91/3066 complicated by recurrent disease on chemoradiation therapy with carboplatin and paclitaxel from 07/2021-09/2021, presented on 09/24 with hypotension, accompanied by increasing fatigue and weakness for 2 days later noted to be lethargic then found to be hypotensive. He reports having liquid stools since admission. On admission, he had intermittent low-grade fever up to 100.8 °F with leukocytopenia of 2200 (present since 08/2021), elevated procalcitonin of 4.56 ng/mL. SARS-CoV-2 PCR was not detected. Urine Streptococcus pneumoniae and Legionella antigens were negative. Blood cultures showed no growth to date. Chest CTA showed status post left upper lobectomy with large cavity in the left upper lung unchanged from that in 08/2021, multifocal interstitial coarsening and in the lower lungs bilaterally in the left lower lung with surgical sutures unchanged from that in 08/2021, stable lobulated nodular mass approximately 1.2 cm in the area of the lingula, pleural-based densities likely pleuroparenchymal scarring in the lower lungs bilaterally unchanged from that in 08/2021, nonspecific hilar and mediastinal nodes, Mediport in situ. CT abdomen and pelvis showed mild diverticulosis, heterogenous and enlarged prostate, subtle area of sclerosis in the left iliac wing probably developing metastatic disease. Cefepime and vancomycin were started on admission. Subjective: Patient was seen and examined. No chills, no abdominal pain, no diarrhea, no rash, no itching. He reports feeling better.       Objective:  BP (!) 108/56   Pulse 90   Temp 98.2 °F (36.8 °C) (Temporal)   Resp 22   Ht 5' 9\" (1.753 m)   Wt 147 lb (66.7 kg)   SpO2 96%   BMI 21.71 kg/m²   Constitutional: Alert, not in distress  Respiratory: Clear breath sounds, no crackles, no wheezes  Cardiovascular: Regular rate and rhythm, no murmurs  Gastrointestinal: Bowel sounds present, soft, nontender  Skin: Warm and dry, no active dermatoses. Right chest wall venous access port with no surrounding erythema and tenderness. Musculoskeletal: No joint swelling, no joint erythema    Labs, imaging, and medical records/notes were personally reviewed. Assessment:  SIRS, suspect sepsis, etiology unclear  Immunocompromised state  Left upper lobe lung adenocarcinoma diagnosed in 04/2020 s/p left upper lobectomy and left lower lobe wedge resection in 47/4438 complicated by recurrent disease on chemoradiation therapy with carboplatin and paclitaxel     Recommendations:  Continue cefepime at 2 g every 8 hours and vancomycin dosed according to level per pharmacy for now pending blood cultures. Follow-up stool C. difficile toxin PCR. Follow-up blood cultures. Consider removing venous access port if no longer indicated, as per patient's request as well. Continue supportive care.     Thank you for involving me in the care of Maggy Kwok. I will continue to follow. Please do not hesitate to call for any questions or concerns.     Electronically signed by Carmina Andrea MD on 9/27/2021 at 10:53 AM Modified Advancement Flap Text: The defect edges were debeveled with a #15 scalpel blade.  Given the location of the defect, shape of the defect and the proximity to free margins a modified advancement flap was deemed most appropriate.  Using a sterile surgical marker, an appropriate advancement flap was drawn incorporating the defect and placing the expected incisions within the relaxed skin tension lines where possible.    The area thus outlined was incised deep to adipose tissue with a #15 scalpel blade.  The skin margins were undermined to an appropriate distance in all directions utilizing iris scissors.

## 2024-02-27 NOTE — PATIENT INSTRUCTIONS
Continue daily fractionated radiation therapy as scheduled. Please see weekly OTV note and intial consultation letter in [facility] for clinical details. Igor Hermosillo. Yousif Hernandez MD [facility]:  [phone]   FAX: [phone]  [facility]:  [phone]   FAX:    [phone]  [facility]:  [phone]   FAX:  [phone]  Email: [email or web address] The DP pulses are 1+ bilaterally.